# Patient Record
Sex: FEMALE | Race: WHITE | NOT HISPANIC OR LATINO | Employment: OTHER | URBAN - METROPOLITAN AREA
[De-identification: names, ages, dates, MRNs, and addresses within clinical notes are randomized per-mention and may not be internally consistent; named-entity substitution may affect disease eponyms.]

---

## 2017-06-19 ENCOUNTER — TELEPHONE (OUTPATIENT)
Dept: NEUROLOGY | Facility: CLINIC | Age: 62
End: 2017-06-19

## 2017-07-12 ENCOUNTER — TELEPHONE (OUTPATIENT)
Dept: NEUROLOGY | Facility: CLINIC | Age: 62
End: 2017-07-12

## 2019-05-13 NOTE — TELEPHONE ENCOUNTER
----- Message from Patrick Dawson sent at 5/13/2019 10:00 AM CDT -----  Contact: pt  Type:  Sooner Apoointment Request    Caller is requesting a sooner appointment.  Caller declined first available appointment listed below.  Caller will not accept being placed on the waitlist and is requesting a message be sent to doctor.    Name of Caller:  pt  When is the first available appointment?  6.25.19  Symptoms:  est care / previous pt at previous location / 4 mo fu   Best Call Back Number:  [phone]  Additional Information:

## 2019-05-14 NOTE — TELEPHONE ENCOUNTER
----- Message from Lizzy Salmeron sent at 5/14/2019  3:13 PM CDT -----  Contact: Patient   Type:  Sooner Apoointment Request    Caller is requesting a sooner appointment.  Caller declined first available appointment listed below.  Caller will not accept being placed on the waitlist and is requesting a message be sent to doctor.    Name of Caller:  Patient  When is the first available appointment?  07/03  Symptoms:  Establish care and follow up from hospital  Best Call Back Number:  [phone]  Additional Information:  Please contact to advise

## 2019-05-15 DIAGNOSIS — I63.519 CEREBROVASCULAR ACCIDENT (CVA) DUE TO OCCLUSION OF MIDDLE CEREBRAL ARTERY, UNSPECIFIED BLOOD VESSEL LATERALITY: Primary | ICD-10-CM

## 2019-05-15 RX ORDER — CLOPIDOGREL BISULFATE 75 MG/1
75 TABLET ORAL NIGHTLY
Qty: 30 TABLET | Refills: 6 | Status: SHIPPED | OUTPATIENT
Start: 2019-05-15 | End: 2019-06-04 | Stop reason: SDUPTHER

## 2019-05-15 NOTE — TELEPHONE ENCOUNTER
----- Message from Sabrina Sears sent at 5/15/2019  8:27 AM CDT -----  Contact: Margie riggins  Type:  RX Refill Request    Who Called:  Yael  Refill or New Rx:  refills  RX Name and Strength:  clopidogrel (PLAVIX) 75 mg tablet  How is the patient currently taking it? (ex. 1XDay):  As directed  Is this a 30 day or 90 day RX:  30  Preferred Pharmacy with phone number:    [facility] [address] [facility] [id number] [phone]    Local or Mail Order:  local  Ordering Provider:  Kemi Blanco Call Back Number:  [phone]  Additional Information:  Pt is also requesting blood work to be ordered. Pls call pt regarding both.

## 2019-06-05 RX ORDER — CLOPIDOGREL BISULFATE 75 MG/1
75 TABLET ORAL NIGHTLY
Qty: 30 TABLET | Refills: 2 | Status: SHIPPED | OUTPATIENT
Start: 2019-06-05 | End: 2019-06-19 | Stop reason: SDUPTHER

## 2019-06-19 ENCOUNTER — PATIENT OUTREACH (OUTPATIENT)
Dept: ADMINISTRATIVE | Facility: HOSPITAL | Age: 64
End: 2019-06-19

## 2019-06-19 RX ORDER — ZOLPIDEM TARTRATE 10 MG/1
10 TABLET ORAL NIGHTLY
Qty: 30 TABLET | Refills: 3 | Status: SHIPPED | OUTPATIENT
Start: 2019-06-19 | End: 2019-07-03 | Stop reason: SDUPTHER

## 2019-06-19 RX ORDER — CLOPIDOGREL BISULFATE 75 MG/1
75 TABLET ORAL NIGHTLY
Qty: 30 TABLET | Refills: 2 | Status: SHIPPED | OUTPATIENT
Start: 2019-06-19 | End: 2019-07-03 | Stop reason: SDUPTHER

## 2019-06-19 RX ORDER — TOPIRAMATE 100 MG/1
100 TABLET, FILM COATED ORAL NIGHTLY
Qty: 90 TABLET | Refills: 3 | Status: SHIPPED | OUTPATIENT
Start: 2019-06-19 | End: 2019-07-03 | Stop reason: SDUPTHER

## 2019-06-19 RX ORDER — TOPIRAMATE 100 MG/1
1 TABLET, FILM COATED ORAL NIGHTLY
Refills: 3 | COMMUNITY
Start: 2019-05-29 | End: 2019-06-19 | Stop reason: SDUPTHER

## 2019-06-19 NOTE — PROGRESS NOTES
Health Maintenance Due   Topic Date Due    Hepatitis C Screening  1955    TETANUS VACCINE  07/12/1974    Mammogram  07/12/1996    Colonoscopy  07/12/2006       Chart review completed 06/19/2019

## 2019-06-19 NOTE — LETTER
June 19, 2019    Margie Zamudio  [address] [address]             [facility]  [address]  [facility] [address]  Phone: [phone] Dear Corby Zamudio:    [facility] is committed to your overall health.  To help you get the most out of each of your visits, we will review your information to make sure you are up to date on all of your recommended tests and/or procedures.      Luis Mcmullen MD  has found that your chart shows you may be due for the following:    Health Maintenance Due   Topic    Hepatitis C Screening     TETANUS VACCINE     Mammogram     Colonoscopy        If you have had any of the above done at another facility, please bring the records with you or FAX them to [phone] so that your record at [facility] will be complete.  If you have not had any of these tests or procedures done recently and would like to complete this testing, please call [phone] or send a message through your [facility] portal to your provider's office.    If you have an upcoming scheduled appointment for the above test and/or procedures, please disregard this letter.        If you have any questions or concerns, please don't hesitate to call.    Sincerely,    Delfina JAMES, Panel Care Coordinator, [phone]   [facility]  [address] [facility] [address]  [place] [address]  [phone] (f)

## 2019-06-19 NOTE — TELEPHONE ENCOUNTER
----- Message from Carmita Reed sent at 6/19/2019  9:19 AM CDT -----  Contact: pt  Calling about medications and have questions and please advise. [phone]

## 2019-07-01 ENCOUNTER — LAB VISIT (OUTPATIENT)
Dept: LAB | Facility: HOSPITAL | Age: 64
End: 2019-07-01
Attending: INTERNAL MEDICINE
Payer: MEDICARE

## 2019-07-01 DIAGNOSIS — I63.519 CEREBROVASCULAR ACCIDENT (CVA) DUE TO OCCLUSION OF MIDDLE CEREBRAL ARTERY, UNSPECIFIED BLOOD VESSEL LATERALITY: ICD-10-CM

## 2019-07-01 LAB
ALBUMIN SERPL BCP-MCNC: 3.5 G/DL (ref 3.5–5.2)
ALP SERPL-CCNC: 191 U/L (ref 55–135)
ALT SERPL W/O P-5'-P-CCNC: 17 U/L (ref 10–44)
ANION GAP SERPL CALC-SCNC: 8 MMOL/L (ref 8–16)
AST SERPL-CCNC: 19 U/L (ref 10–40)
BASOPHILS # BLD AUTO: 0.08 K/UL (ref 0–0.2)
BASOPHILS NFR BLD: 1 % (ref 0–1.9)
BILIRUB SERPL-MCNC: 0.4 MG/DL (ref 0.1–1)
BUN SERPL-MCNC: 8 MG/DL (ref 8–23)
CALCIUM SERPL-MCNC: 10.2 MG/DL (ref 8.7–10.5)
CHLORIDE SERPL-SCNC: 103 MMOL/L (ref 95–110)
CHOLEST SERPL-MCNC: 99 MG/DL (ref 120–199)
CHOLEST/HDLC SERPL: 2.3 {RATIO} (ref 2–5)
CO2 SERPL-SCNC: 32 MMOL/L (ref 23–29)
CREAT SERPL-MCNC: 0.8 MG/DL (ref 0.5–1.4)
DIFFERENTIAL METHOD: ABNORMAL
EOSINOPHIL # BLD AUTO: 0.3 K/UL (ref 0–0.5)
EOSINOPHIL NFR BLD: 3 % (ref 0–8)
ERYTHROCYTE [DISTWIDTH] IN BLOOD BY AUTOMATED COUNT: 14.1 % (ref 11.5–14.5)
EST. GFR  (AFRICAN AMERICAN): >60 ML/MIN/1.73 M^2
EST. GFR  (NON AFRICAN AMERICAN): >60 ML/MIN/1.73 M^2
GLUCOSE SERPL-MCNC: 84 MG/DL (ref 70–110)
HCT VFR BLD AUTO: 49.9 % (ref 37–48.5)
HDLC SERPL-MCNC: 43 MG/DL (ref 40–75)
HDLC SERPL: 43.4 % (ref 20–50)
HGB BLD-MCNC: 15.6 G/DL (ref 12–16)
IMM GRANULOCYTES # BLD AUTO: 0.02 K/UL (ref 0–0.04)
IMM GRANULOCYTES NFR BLD AUTO: 0.2 % (ref 0–0.5)
LDLC SERPL CALC-MCNC: 46.6 MG/DL (ref 63–159)
LYMPHOCYTES # BLD AUTO: 1.8 K/UL (ref 1–4.8)
LYMPHOCYTES NFR BLD: 21.7 % (ref 18–48)
MCH RBC QN AUTO: 31.4 PG (ref 27–31)
MCHC RBC AUTO-ENTMCNC: 31.3 G/DL (ref 32–36)
MCV RBC AUTO: 100 FL (ref 82–98)
MONOCYTES # BLD AUTO: 0.6 K/UL (ref 0.3–1)
MONOCYTES NFR BLD: 7.2 % (ref 4–15)
NEUTROPHILS # BLD AUTO: 5.6 K/UL (ref 1.8–7.7)
NEUTROPHILS NFR BLD: 66.9 % (ref 38–73)
NONHDLC SERPL-MCNC: 56 MG/DL
NRBC BLD-RTO: 0 /100 WBC
PLATELET # BLD AUTO: 253 K/UL (ref 150–350)
PMV BLD AUTO: 10.7 FL (ref 9.2–12.9)
POTASSIUM SERPL-SCNC: 4 MMOL/L (ref 3.5–5.1)
PROT SERPL-MCNC: 6.2 G/DL (ref 6–8.4)
RBC # BLD AUTO: 4.97 M/UL (ref 4–5.4)
SODIUM SERPL-SCNC: 143 MMOL/L (ref 136–145)
TRIGL SERPL-MCNC: 47 MG/DL (ref 30–150)
WBC # BLD AUTO: 8.39 K/UL (ref 3.9–12.7)

## 2019-07-01 PROCEDURE — 80053 COMPREHEN METABOLIC PANEL: CPT

## 2019-07-01 PROCEDURE — 85025 COMPLETE CBC W/AUTO DIFF WBC: CPT

## 2019-07-01 PROCEDURE — 36415 COLL VENOUS BLD VENIPUNCTURE: CPT | Mod: PO

## 2019-07-01 PROCEDURE — 80061 LIPID PANEL: CPT

## 2019-07-01 RX ORDER — GABAPENTIN 800 MG/1
800 TABLET ORAL 3 TIMES DAILY
Qty: 90 TABLET | Refills: 3 | Status: SHIPPED | OUTPATIENT
Start: 2019-07-01 | End: 2019-07-03 | Stop reason: SDUPTHER

## 2019-07-01 RX ORDER — PRAVASTATIN SODIUM 10 MG/1
20 TABLET ORAL NIGHTLY
Qty: 90 TABLET | Refills: 1 | Status: SHIPPED | OUTPATIENT
Start: 2019-07-01 | End: 2019-07-03 | Stop reason: SDUPTHER

## 2019-07-03 ENCOUNTER — OFFICE VISIT (OUTPATIENT)
Dept: FAMILY MEDICINE | Facility: CLINIC | Age: 64
End: 2019-07-03
Payer: MEDICARE

## 2019-07-03 VITALS
SYSTOLIC BLOOD PRESSURE: 118 MMHG | BODY MASS INDEX: 22.21 KG/M2 | HEIGHT: 60 IN | HEART RATE: 85 BPM | WEIGHT: 113.13 LBS | OXYGEN SATURATION: 98 % | DIASTOLIC BLOOD PRESSURE: 86 MMHG

## 2019-07-03 DIAGNOSIS — J41.1 MUCOPURULENT CHRONIC BRONCHITIS: Primary | ICD-10-CM

## 2019-07-03 DIAGNOSIS — I63.50 CEREBRAL ARTERY OCCLUSION WITH CEREBRAL INFARCTION: ICD-10-CM

## 2019-07-03 DIAGNOSIS — G47.01 INSOMNIA DUE TO MEDICAL CONDITION: ICD-10-CM

## 2019-07-03 DIAGNOSIS — G89.29 CHRONIC BILATERAL LOW BACK PAIN WITH BILATERAL SCIATICA: ICD-10-CM

## 2019-07-03 DIAGNOSIS — M54.41 CHRONIC BILATERAL LOW BACK PAIN WITH BILATERAL SCIATICA: ICD-10-CM

## 2019-07-03 DIAGNOSIS — F32.4 MAJOR DEPRESSIVE DISORDER WITH SINGLE EPISODE, IN PARTIAL REMISSION: ICD-10-CM

## 2019-07-03 DIAGNOSIS — Z12.39 BREAST CANCER SCREENING: ICD-10-CM

## 2019-07-03 DIAGNOSIS — E78.2 MIXED HYPERLIPIDEMIA: ICD-10-CM

## 2019-07-03 DIAGNOSIS — M54.42 CHRONIC BILATERAL LOW BACK PAIN WITH BILATERAL SCIATICA: ICD-10-CM

## 2019-07-03 DIAGNOSIS — F41.9 ANXIETY: ICD-10-CM

## 2019-07-03 PROCEDURE — 99214 PR OFFICE/OUTPT VISIT, EST, LEVL IV, 30-39 MIN: ICD-10-PCS | Mod: S$PBB,,, | Performed by: INTERNAL MEDICINE

## 2019-07-03 PROCEDURE — 99999 PR PBB SHADOW E&M-EST. PATIENT-LVL III: ICD-10-PCS | Mod: PBBFAC,,, | Performed by: INTERNAL MEDICINE

## 2019-07-03 PROCEDURE — 99213 OFFICE O/P EST LOW 20 MIN: CPT | Mod: PBBFAC,PO | Performed by: INTERNAL MEDICINE

## 2019-07-03 PROCEDURE — 99999 PR PBB SHADOW E&M-EST. PATIENT-LVL III: CPT | Mod: PBBFAC,,, | Performed by: INTERNAL MEDICINE

## 2019-07-03 PROCEDURE — 99214 OFFICE O/P EST MOD 30 MIN: CPT | Mod: S$PBB,,, | Performed by: INTERNAL MEDICINE

## 2019-07-03 RX ORDER — GABAPENTIN 800 MG/1
800 TABLET ORAL 3 TIMES DAILY
Qty: 270 TABLET | Refills: 3 | Status: ON HOLD | OUTPATIENT
Start: 2019-07-03 | End: 2019-11-21 | Stop reason: SDUPTHER

## 2019-07-03 RX ORDER — ZOLPIDEM TARTRATE 10 MG/1
10 TABLET ORAL NIGHTLY
Qty: 30 TABLET | Refills: 3 | Status: ON HOLD | OUTPATIENT
Start: 2019-07-03 | End: 2019-11-21 | Stop reason: SDUPTHER

## 2019-07-03 RX ORDER — TOPIRAMATE 100 MG/1
100 TABLET, FILM COATED ORAL NIGHTLY
Qty: 90 TABLET | Refills: 3 | Status: SHIPPED | OUTPATIENT
Start: 2019-07-03 | End: 2020-02-07

## 2019-07-03 RX ORDER — ALBUTEROL SULFATE 90 UG/1
2 AEROSOL, METERED RESPIRATORY (INHALATION) EVERY 4 HOURS PRN
Qty: 18 G | Refills: 6 | Status: SHIPPED | OUTPATIENT
Start: 2019-07-03 | End: 2019-11-07 | Stop reason: SDUPTHER

## 2019-07-03 RX ORDER — PRAVASTATIN SODIUM 10 MG/1
10 TABLET ORAL NIGHTLY
Qty: 90 TABLET | Refills: 3 | Status: SHIPPED | OUTPATIENT
Start: 2019-07-03 | End: 2020-02-07 | Stop reason: SDUPTHER

## 2019-07-03 RX ORDER — CLOPIDOGREL BISULFATE 75 MG/1
75 TABLET ORAL NIGHTLY
Qty: 90 TABLET | Refills: 3 | Status: SHIPPED | OUTPATIENT
Start: 2019-07-03 | End: 2019-12-18 | Stop reason: SDUPTHER

## 2019-07-03 RX ORDER — ERGOCALCIFEROL 1.25 MG/1
50000 CAPSULE ORAL
Qty: 12 CAPSULE | Refills: 3 | Status: SHIPPED | OUTPATIENT
Start: 2019-07-05 | End: 2020-02-07

## 2019-07-03 RX ORDER — DULOXETIN HYDROCHLORIDE 30 MG/1
30 CAPSULE, DELAYED RELEASE ORAL DAILY
Refills: 4 | COMMUNITY
Start: 2019-06-24 | End: 2019-07-03 | Stop reason: SDUPTHER

## 2019-07-03 RX ORDER — DULOXETIN HYDROCHLORIDE 30 MG/1
30 CAPSULE, DELAYED RELEASE ORAL DAILY
Qty: 90 CAPSULE | Refills: 3 | Status: SHIPPED | OUTPATIENT
Start: 2019-07-03 | End: 2020-02-07 | Stop reason: SDUPTHER

## 2019-07-03 RX ORDER — ALPRAZOLAM 1 MG/1
1 TABLET ORAL 3 TIMES DAILY
Qty: 90 TABLET | Refills: 3 | Status: SHIPPED | OUTPATIENT
Start: 2019-07-03 | End: 2020-01-20

## 2019-07-03 RX ORDER — FLUTICASONE PROPIONATE AND SALMETEROL 250; 50 UG/1; UG/1
1 POWDER RESPIRATORY (INHALATION)
Qty: 180 EACH | Refills: 3 | Status: SHIPPED | OUTPATIENT
Start: 2019-07-03 | End: 2019-07-08 | Stop reason: SDUPTHER

## 2019-07-03 NOTE — PROGRESS NOTES
Patient ID: Margie Zamudio     Chief Complaint:   Chief Complaint   Patient presents with    Establish Care/Previous PT        HPI: New Patient to [facility] but known to me.   Most significant PAST MEDICAL HISTORY of Left  CVA w/ Right suzette that she has recovered well from but not fully.  She also has significant COPD due to long history of smoking.   Needs refills and I'll try to get her Advair as a controller.   Labs reviewed: Lipids low and she lost weight- will decrease Pravastatin to 10 mg /day. Vit D likely still low- add daily Vit D.     Review of Systems   Constitutional: Negative.    HENT: Negative.    Eyes: Negative.    Respiratory: Positive for cough and shortness of breath.    Cardiovascular: Negative.    Gastrointestinal: Negative.    Endocrine: Negative.    Genitourinary: Negative.    Musculoskeletal: Positive for gait problem.   Skin: Negative.    Allergic/Immunologic: Negative.    Hematological: Negative.    Psychiatric/Behavioral: Negative.           Objective:      Physical Exam   Physical Exam   Constitutional: She is oriented to person, place, and time. She appears well-developed and well-nourished.   HENT:   Head: Normocephalic and atraumatic.   Nose: Nose normal.   Mouth/Throat: Oropharynx is clear and moist.   Eyes: Pupils are equal, round, and reactive to light. Conjunctivae and EOM are normal.   Neck: Normal range of motion. Neck supple.   Cardiovascular: Normal rate, regular rhythm, normal heart sounds and intact distal pulses.   Pulmonary/Chest: Effort normal. She has decreased breath sounds in the right upper field, the right middle field, the right lower field, the left upper field and the left middle field. She has rhonchi in the right lower field and the left lower field.   Abdominal: Soft. Bowel sounds are normal.   Musculoskeletal:        Left hip: She exhibits decreased range of motion and decreased strength.   Neurological: She is alert and oriented to person, place, and time.    Skin: Skin is warm and dry. Capillary refill takes less than 2 seconds.   Psychiatric: She has a normal mood and affect. Her behavior is normal. Judgment and thought content normal.   Nursing note and vitals reviewed.         Vitals:   Vitals:    07/03/19 1533   BP: 118/86   BP Location: Right arm   Patient Position: Sitting   BP Method: Medium (Manual)   Pulse: 85   SpO2: 98%   Weight: 51.3 kg (113 lb 1.5 oz)   Height: 5' (1.524 m)      Assessment:       Patient Active Problem List    Diagnosis Date Noted    Pre-operative respiratory examination 08/31/2015    Carotid stenosis, symptomatic, with infarction 08/30/2015    Cerebral artery occlusion with cerebral infarction 08/30/2015    Unspecified essential hypertension     Chronic prescription benzodiazepine use 08/29/2015    Hypokalemia 08/29/2015    Smoker 08/29/2015    Stroke 08/28/2015    Overdose of opiate or related narcotic 02/23/2014    Gastroenteritis 10/14/2013    Chronic back pain     Narcotic dependence     Osteoporosis     COPD (chronic obstructive pulmonary disease) 10/13/2013          Plan:       Margie was seen today for establish care/previous pt.    Diagnoses and all orders for this visit:    Mucopurulent chronic bronchitis  -     albuterol (PROVENTIL/VENTOLIN HFA) 90 mcg/actuation inhaler; Inhale 2 puffs into the lungs every 4 (four) hours as needed for Wheezing or Shortness of Breath.  -     fluticasone-salmeterol diskus inhaler 250-50 mcg; Inhale 1 puff into the lungs every 4 to 6 hours as needed.    Cerebral artery occlusion with cerebral infarction  -     clopidogrel (PLAVIX) 75 mg tablet; Take 1 tablet (75 mg total) by mouth nightly.    Anxiety  -     ALPRAZolam (XANAX) 1 MG tablet; Take 1 tablet (1 mg total) by mouth 3 (three) times daily.  CONTROLLED      Major depressive disorder with single episode, in partial remission  -     DULoxetine (CYMBALTA) 30 MG capsule; Take 1 capsule (30 mg total) by mouth once daily.  CONTROLLED       Chronic bilateral low back pain with bilateral sciatica  -     gabapentin (NEURONTIN) 800 MG tablet; Take 1 tablet (800 mg total) by mouth 3 (three) times daily.    Mixed hyperlipidemia  -     pravastatin (PRAVACHOL) 10 MG tablet; Take 1 tablet (10 mg total) by mouth every evening.  -     Hepatitis C antibody; Future  OVERMEDICATED    Insomnia due to medical condition  -     topiramate (TOPAMAX) 100 MG tablet; Take 1 tablet (100 mg total) by mouth nightly.  -     zolpidem (AMBIEN) 10 mg Tab; Take 1 tablet (10 mg total) by mouth every evening.    Breast cancer screening  -     Mammo Digital Screening Bilat w/ Humberto; Future    Other orders  -     ergocalciferol (ERGOCALCIFEROL) 50,000 unit Cap; Take 1 capsule (50,000 Units total) by mouth Every Friday.  Take OTC Vit D 2000 units other 6 days of week

## 2019-07-08 DIAGNOSIS — M54.42 CHRONIC BILATERAL LOW BACK PAIN WITH BILATERAL SCIATICA: ICD-10-CM

## 2019-07-08 DIAGNOSIS — E78.2 MIXED HYPERLIPIDEMIA: ICD-10-CM

## 2019-07-08 DIAGNOSIS — G47.01 INSOMNIA DUE TO MEDICAL CONDITION: ICD-10-CM

## 2019-07-08 DIAGNOSIS — F32.4 MAJOR DEPRESSIVE DISORDER WITH SINGLE EPISODE, IN PARTIAL REMISSION: ICD-10-CM

## 2019-07-08 DIAGNOSIS — G89.29 CHRONIC BILATERAL LOW BACK PAIN WITH BILATERAL SCIATICA: ICD-10-CM

## 2019-07-08 DIAGNOSIS — F41.9 ANXIETY: ICD-10-CM

## 2019-07-08 DIAGNOSIS — I63.50 CEREBRAL ARTERY OCCLUSION WITH CEREBRAL INFARCTION: ICD-10-CM

## 2019-07-08 DIAGNOSIS — M54.41 CHRONIC BILATERAL LOW BACK PAIN WITH BILATERAL SCIATICA: ICD-10-CM

## 2019-07-08 DIAGNOSIS — J41.1 MUCOPURULENT CHRONIC BRONCHITIS: ICD-10-CM

## 2019-07-08 RX ORDER — FLUTICASONE PROPIONATE AND SALMETEROL 250; 50 UG/1; UG/1
1 POWDER RESPIRATORY (INHALATION)
Qty: 180 EACH | Refills: 3 | Status: SHIPPED | OUTPATIENT
Start: 2019-07-08 | End: 2019-12-18 | Stop reason: SDUPTHER

## 2019-07-08 NOTE — TELEPHONE ENCOUNTER
----- Message from Katja Burgess sent at 7/8/2019  8:24 AM CDT -----  Contact: pt [phone]  Patient called and asked if you will call all of her medications into [facility] in [place] she stated one of her medications was called into the pharamacy at the [facility] pt is asking if you will call it into the [facility] pharmacy instead.

## 2019-08-28 ENCOUNTER — DOCUMENTATION ONLY (OUTPATIENT)
Dept: FAMILY MEDICINE | Facility: CLINIC | Age: 64
End: 2019-08-28

## 2019-08-28 NOTE — PROGRESS NOTES
PA initiated for Advair Diskus 250/50  CMM: [id number] - [id number]  [employer]  -----------------------------------------  APPROVED  Valid 5/30/19-8/27/2020

## 2019-10-25 ENCOUNTER — PATIENT OUTREACH (OUTPATIENT)
Dept: ADMINISTRATIVE | Facility: HOSPITAL | Age: 64
End: 2019-10-25

## 2019-10-25 NOTE — PROGRESS NOTES
Health Maintenance Due   Topic Date Due    Hepatitis C Screening  1955    Pneumococcal Vaccine (Medium Risk) (1 of 1 - PPSV23) 07/12/1975    Mammogram  07/12/1996    Shingles Vaccine (1 of 2) 07/12/2006    Influenza Vaccine (1) 09/01/2019     Dear Judith, [facility] is committed to your overall health.  To help you get the most out of each of your visits, we will review your information to make sure you are up to date on all of your recommended tests and/or procedures.      Luis Mcmullen MD  has found that your chart shows you may be due for the following:    Health Maintenance Due   Topic    Hepatitis C Screening     Pneumococcal Vaccine (Medium Risk) (1 of 1 - PPSV23)    Mammogram          If you have had any of the above done at another facility, please bring the records with you or Fax them to [phone] so that your record at [facility] will be complete. If you have not had any of these tests or procedures done recently and would like to complete this testing ,  please call [phone] or send a message through your [facility] portal to your provider's office.     If you have an upcoming scheduled appointment for the above test and/or procedures, please disregard this letter.    If you are currently taking medication, please bring it with you to your appointment for review.      Thank you for letting us care for you,        Lesa Enriquez, Care Coordinator  [facility]  Phone: [phone]  Fax: [phone]

## 2019-10-25 NOTE — LETTER
October 25, 2019    Margie Zamudio  [address] [address]             [facility]  [address]  [facility] [address]  Phone: [phone] Dear Margie      [facility] committed to your overall health.  To help you get the most out of each of your visits, we will review your information to make sure you are up to date on all of your recommended tests and/or procedures.      Luis Mcmullen MD  has found that your chart shows you may be due for the following:    One-time Hepatitis C Screening lab test(a viral condition that can harm the liver)  Pneumonia Immunization  Mammogram  Influenza Vaccine  Shingles Immunization    If you have had any of the above done at another facility, please bring the records with you or Fax them to [phone] so that your record at [facility] will be complete. If you have not had any of these tests or procedures done recently and would like to complete this testing ,  please call [phone] or send a message through your [facility] portal to your provider's office.     If you have an upcoming scheduled appointment for the above test and/or procedures, please disregard this letter.    If you are currently taking medication, please bring it with you to your appointment for review.      Thank you for letting us care for you,    Lesa Enriquez, Care Coordinator  [facility]  Phone: [phone]  Fax: [phone]

## 2019-11-04 ENCOUNTER — LAB VISIT (OUTPATIENT)
Dept: LAB | Facility: HOSPITAL | Age: 64
End: 2019-11-04
Attending: INTERNAL MEDICINE
Payer: MEDICARE

## 2019-11-04 DIAGNOSIS — E78.2 MIXED HYPERLIPIDEMIA: ICD-10-CM

## 2019-11-04 LAB
CHOLEST SERPL-MCNC: 128 MG/DL (ref 120–199)
CHOLEST/HDLC SERPL: 2 {RATIO} (ref 2–5)
HDLC SERPL-MCNC: 63 MG/DL (ref 40–75)
HDLC SERPL: 49.2 % (ref 20–50)
LDLC SERPL CALC-MCNC: 58.2 MG/DL (ref 63–159)
NONHDLC SERPL-MCNC: 65 MG/DL
TRIGL SERPL-MCNC: 34 MG/DL (ref 30–150)

## 2019-11-04 PROCEDURE — 36415 COLL VENOUS BLD VENIPUNCTURE: CPT | Mod: PO

## 2019-11-04 PROCEDURE — 86803 HEPATITIS C AB TEST: CPT

## 2019-11-04 PROCEDURE — 80061 LIPID PANEL: CPT

## 2019-11-05 LAB — HCV AB SERPL QL IA: NEGATIVE

## 2019-11-07 ENCOUNTER — OFFICE VISIT (OUTPATIENT)
Dept: FAMILY MEDICINE | Facility: CLINIC | Age: 64
End: 2019-11-07
Payer: MEDICARE

## 2019-11-07 VITALS
DIASTOLIC BLOOD PRESSURE: 70 MMHG | HEART RATE: 76 BPM | BODY MASS INDEX: 22.33 KG/M2 | HEIGHT: 60 IN | TEMPERATURE: 99 F | WEIGHT: 113.75 LBS | OXYGEN SATURATION: 94 % | SYSTOLIC BLOOD PRESSURE: 108 MMHG

## 2019-11-07 DIAGNOSIS — I63.50 CEREBRAL ARTERY OCCLUSION WITH CEREBRAL INFARCTION: ICD-10-CM

## 2019-11-07 DIAGNOSIS — J44.1 COPD WITH ACUTE EXACERBATION: Primary | ICD-10-CM

## 2019-11-07 DIAGNOSIS — E78.2 MIXED HYPERLIPIDEMIA: ICD-10-CM

## 2019-11-07 DIAGNOSIS — J41.1 MUCOPURULENT CHRONIC BRONCHITIS: ICD-10-CM

## 2019-11-07 PROCEDURE — 99214 PR OFFICE/OUTPT VISIT, EST, LEVL IV, 30-39 MIN: ICD-10-PCS | Mod: S$PBB,,, | Performed by: INTERNAL MEDICINE

## 2019-11-07 PROCEDURE — 99999 PR PBB SHADOW E&M-EST. PATIENT-LVL III: ICD-10-PCS | Mod: PBBFAC,,, | Performed by: INTERNAL MEDICINE

## 2019-11-07 PROCEDURE — 99214 OFFICE O/P EST MOD 30 MIN: CPT | Mod: S$PBB,,, | Performed by: INTERNAL MEDICINE

## 2019-11-07 PROCEDURE — 99999 PR PBB SHADOW E&M-EST. PATIENT-LVL III: CPT | Mod: PBBFAC,,, | Performed by: INTERNAL MEDICINE

## 2019-11-07 PROCEDURE — 99213 OFFICE O/P EST LOW 20 MIN: CPT | Mod: PBBFAC,PO | Performed by: INTERNAL MEDICINE

## 2019-11-07 PROCEDURE — 96372 THER/PROPH/DIAG INJ SC/IM: CPT | Mod: PBBFAC,PO

## 2019-11-07 PROCEDURE — 94640 AIRWAY INHALATION TREATMENT: CPT | Mod: PBBFAC,59,PO

## 2019-11-07 RX ORDER — ALBUTEROL SULFATE 0.83 MG/ML
2.5 SOLUTION RESPIRATORY (INHALATION) EVERY 4 HOURS PRN
Qty: 100 EACH | Refills: 3 | Status: SHIPPED | OUTPATIENT
Start: 2019-11-07 | End: 2019-11-12 | Stop reason: SDUPTHER

## 2019-11-07 RX ORDER — AMOXICILLIN AND CLAVULANATE POTASSIUM 875; 125 MG/1; MG/1
1 TABLET, FILM COATED ORAL EVERY 12 HOURS
Qty: 14 TABLET | Refills: 0 | Status: SHIPPED | OUTPATIENT
Start: 2019-11-07 | End: 2019-11-14

## 2019-11-07 RX ORDER — ALBUTEROL SULFATE 0.83 MG/ML
2.5 SOLUTION RESPIRATORY (INHALATION)
Status: COMPLETED | OUTPATIENT
Start: 2019-11-07 | End: 2019-11-07

## 2019-11-07 RX ORDER — PREDNISONE 10 MG/1
10 TABLET ORAL DAILY
Qty: 28 TABLET | Refills: 0 | Status: ON HOLD | OUTPATIENT
Start: 2019-11-07 | End: 2019-11-21 | Stop reason: HOSPADM

## 2019-11-07 RX ORDER — ALBUTEROL SULFATE 0.83 MG/ML
2.5 SOLUTION RESPIRATORY (INHALATION)
Status: DISCONTINUED | OUTPATIENT
Start: 2019-11-07 | End: 2019-11-21 | Stop reason: HOSPADM

## 2019-11-07 RX ORDER — TRIAMCINOLONE ACETONIDE 40 MG/ML
80 INJECTION, SUSPENSION INTRA-ARTICULAR; INTRAMUSCULAR ONCE
Status: COMPLETED | OUTPATIENT
Start: 2019-11-07 | End: 2019-11-07

## 2019-11-07 RX ORDER — ALBUTEROL SULFATE 90 UG/1
2 AEROSOL, METERED RESPIRATORY (INHALATION) EVERY 4 HOURS PRN
Qty: 18 G | Refills: 6 | Status: SHIPPED | OUTPATIENT
Start: 2019-11-07 | End: 2020-02-07 | Stop reason: SDUPTHER

## 2019-11-07 RX ADMIN — TRIAMCINOLONE ACETONIDE 80 MG: 40 INJECTION, SUSPENSION INTRA-ARTICULAR; INTRAMUSCULAR at 11:11

## 2019-11-07 RX ADMIN — ALBUTEROL SULFATE 2.5 MG: 0.83 SOLUTION RESPIRATORY (INHALATION) at 12:11

## 2019-11-07 NOTE — PROGRESS NOTES
Patient ID: Margie Zamudio     Chief Complaint:   Chief Complaint   Patient presents with    4 month follow up    Flu Vaccine    Chest Congestion    Medication Refill     Albuterol inhaler        HPI: Complains of symptoms of URI since the weekend: cough w/ clear phlegm, worsened shortness of breath, subjective fevers and chills, no O2 at home. + sick contact in sister that was visiting and she had similar symptoms. Needs refill on Albuterol and some steroids and antibiotics. She was hypoxic when roomed, but it improved w/ deep inspiration. We discussed going to ER but she declines. I will treat her for a COPD flare and she promises to go to ER if her symptoms worsen.     Review of Systems   Constitutional: Negative.    HENT: Negative.    Eyes: Negative.    Respiratory: Positive for cough, shortness of breath and wheezing.    Cardiovascular: Negative.    Gastrointestinal: Negative.    Endocrine: Negative.    Genitourinary: Negative.    Musculoskeletal: Negative.    Skin: Negative.    Allergic/Immunologic: Negative.    Neurological: Negative.    Hematological: Negative.    Psychiatric/Behavioral: Negative.           Objective:      Physical Exam   Physical Exam   Constitutional: She is oriented to person, place, and time. She appears well-developed and well-nourished.   HENT:   Head: Normocephalic and atraumatic.   Nose: Nose normal.   Mouth/Throat: Oropharynx is clear and moist.   Eyes: Pupils are equal, round, and reactive to light. Conjunctivae and EOM are normal.   Neck: Normal range of motion. Neck supple.   Cardiovascular: Normal rate, regular rhythm, normal heart sounds and intact distal pulses.   Pulmonary/Chest: Effort normal. She has wheezes. She has rales.   Prolonged expiratory phase bilaterally  + wheezing and rhonchi   Abdominal: Soft. Bowel sounds are normal.   Musculoskeletal: Normal range of motion.   Neurological: She is alert and oriented to person, place, and time.   Skin: Skin is warm and  dry. Capillary refill takes less than 2 seconds.   Psychiatric: She has a normal mood and affect. Her behavior is normal. Judgment and thought content normal.   Nursing note and vitals reviewed.      Current Outpatient Medications:     albuterol (PROVENTIL/VENTOLIN HFA) 90 mcg/actuation inhaler, Inhale 2 puffs into the lungs every 4 (four) hours as needed for Wheezing or Shortness of Breath., Disp: 18 g, Rfl: 6    ALPRAZolam (XANAX) 1 MG tablet, Take 1 tablet (1 mg total) by mouth 3 (three) times daily., Disp: 90 tablet, Rfl: 3    butalbital-acetaminophen-caffeine -40 mg (FIORICET, ESGIC) -40 mg per tablet, Take 1 tablet by mouth every 6 (six) hours., Disp: , Rfl:     clopidogrel (PLAVIX) 75 mg tablet, Take 1 tablet (75 mg total) by mouth nightly., Disp: 90 tablet, Rfl: 3    DULoxetine (CYMBALTA) 30 MG capsule, Take 1 capsule (30 mg total) by mouth once daily., Disp: 90 capsule, Rfl: 3    ergocalciferol (ERGOCALCIFEROL) 50,000 unit Cap, Take 1 capsule (50,000 Units total) by mouth Every Friday., Disp: 12 capsule, Rfl: 3    fluticasone-salmeterol diskus inhaler 250-50 mcg, Inhale 1 puff into the lungs every 4 to 6 hours as needed., Disp: 180 each, Rfl: 3    gabapentin (NEURONTIN) 800 MG tablet, Take 1 tablet (800 mg total) by mouth 3 (three) times daily., Disp: 270 tablet, Rfl: 3    pravastatin (PRAVACHOL) 10 MG tablet, Take 1 tablet (10 mg total) by mouth every evening., Disp: 90 tablet, Rfl: 3    tiotropium (SPIRIVA) 18 mcg inhalation capsule, Inhale 18 mcg into the lungs every 4 to 6 hours as needed., Disp: , Rfl:     topiramate (TOPAMAX) 100 MG tablet, Take 1 tablet (100 mg total) by mouth nightly., Disp: 90 tablet, Rfl: 3    zolpidem (AMBIEN) 10 mg Tab, Take 1 tablet (10 mg total) by mouth every evening., Disp: 30 tablet, Rfl: 3    amoxicillin-clavulanate 875-125mg (AUGMENTIN) 875-125 mg per tablet, Take 1 tablet by mouth every 12 (twelve) hours. for 7 days, Disp: 14 tablet, Rfl: 0     aspirin (ECOTRIN) 81 MG EC tablet, Take 1 tablet (81 mg total) by mouth once daily., Disp: , Rfl: 0    predniSONE (DELTASONE) 10 MG tablet, Take 1 tablet (10 mg total) by mouth once daily., Disp: 28 tablet, Rfl: 0    Current Facility-Administered Medications:     triamcinolone acetonide injection 80 mg, 80 mg, Intramuscular, Once, Luis Mcmullen MD       Vitals:   Vitals:    11/07/19 1041   BP: 108/70   Pulse: 76   Temp: 98.8 °F (37.1 °C)   TempSrc: Temporal   SpO2: (!) 87%   Weight: 51.6 kg (113 lb 12.1 oz)   Height: 5' (1.524 m)      Assessment:       Patient Active Problem List    Diagnosis Date Noted    COPD with acute exacerbation 11/07/2019    Anxiety 07/03/2019    Major depressive disorder with single episode, in partial remission 07/03/2019    Mixed hyperlipidemia 07/03/2019    Insomnia due to medical condition 07/03/2019    Breast cancer screening 07/03/2019    Pre-operative respiratory examination 08/31/2015    Carotid stenosis, symptomatic, with infarction 08/30/2015    Cerebral artery occlusion with cerebral infarction 08/30/2015    Unspecified essential hypertension     Chronic prescription benzodiazepine use 08/29/2015    Hypokalemia 08/29/2015    Smoker 08/29/2015    Stroke 08/28/2015    Overdose of opiate or related narcotic 02/23/2014    Gastroenteritis 10/14/2013    Chronic back pain     Narcotic dependence     Osteoporosis     COPD (chronic obstructive pulmonary disease) 10/13/2013          Plan:       Margie was seen today for 4 month follow up, flu vaccine, chest congestion and medication refill.    Diagnoses and all orders for this visit:    COPD with acute exacerbation  -     amoxicillin-clavulanate 875-125mg (AUGMENTIN) 875-125 mg per tablet; Take 1 tablet by mouth every 12 (twelve) hours. for 7 days  -     predniSONE (DELTASONE) 10 MG tablet; Take 1 tablet (10 mg total) by mouth once daily.    She sounds MUCH better after an albuterol neb in clinic- much less wheezing  and no rhonchi- will send in Rx for Albuterol 2.5 mg nebs. POx 94% before neb w/ deep breathing.     Mucopurulent chronic bronchitis  -     albuterol (PROVENTIL/VENTOLIN HFA) 90 mcg/actuation inhaler; Inhale 2 puffs into the lungs every 4 (four) hours as needed for Wheezing or Shortness of Breath.    Cerebral artery occlusion with cerebral infarction  Stable    Mixed hyperlipidemia  Controlled     Other orders  -     triamcinolone acetonide injection 80 mg       Flu shot today

## 2019-11-12 ENCOUNTER — DOCUMENTATION ONLY (OUTPATIENT)
Dept: FAMILY MEDICINE | Facility: CLINIC | Age: 64
End: 2019-11-12

## 2019-11-12 DIAGNOSIS — J44.1 COPD WITH ACUTE EXACERBATION: ICD-10-CM

## 2019-11-12 RX ORDER — ALBUTEROL SULFATE 0.83 MG/ML
2.5 SOLUTION RESPIRATORY (INHALATION) EVERY 4 HOURS PRN
Qty: 100 EACH | Refills: 3 | Status: SHIPPED | OUTPATIENT
Start: 2019-11-12 | End: 2019-11-12 | Stop reason: SDUPTHER

## 2019-11-12 NOTE — TELEPHONE ENCOUNTER
----- Message from Chris Reid sent at 11/12/2019  8:48 AM CST -----  Contact: pt  Type:  RX Refill Request    Who Called:  pt  Refill or New Rx:  Refill / new  RX Name and Strength:  albuterol nebulizer solution 2.5 mg / nebulizer device  How is the patient currently taking it? (ex. 1XDay):  As needed  Is this a 30 day or 90 day RX:  90  Preferred Pharmacy with phone number:    [facility] - [place], [place] - [address]  [address]  [address]  [facility] [address]  Phone: [phone] Fax: [phone]    [facility] - [place], [place] - [address]   [facility] [address]  Phone: [phone] Fax: [phone]      Local or Mail Order:  local  Ordering Provider:    Best Call Back Number:  [phone]  Additional Information:  Pt is waiting on nebulizer and solution. Please call to advise on timeline.

## 2019-11-12 NOTE — TELEPHONE ENCOUNTER
Patient asking for machine and tubing as well, her machine Is 10 years old    Needs to be sent to [facility] instead

## 2019-11-12 NOTE — PROGRESS NOTES
PA initiated for Albuterol 0.083% 3ml nebulizer solution  M: [id number] - [id number]  [employer] Medicare Part D  ----------------------------------------  DENIED  Drugs used in a nebulizer at patient's home is covered under Medicare Part B.

## 2019-11-13 RX ORDER — ALBUTEROL SULFATE 0.83 MG/ML
2.5 SOLUTION RESPIRATORY (INHALATION) EVERY 4 HOURS PRN
Qty: 100 EACH | Refills: 3 | Status: SHIPPED | OUTPATIENT
Start: 2019-11-13 | End: 2020-02-07 | Stop reason: SDUPTHER

## 2019-11-19 PROBLEM — R45.851 SUICIDAL IDEATION: Status: ACTIVE | Noted: 2019-11-19

## 2019-11-19 PROBLEM — J44.1 COPD EXACERBATION: Status: ACTIVE | Noted: 2019-11-19

## 2019-11-19 PROBLEM — F17.200 TOBACCO DEPENDENCE: Status: ACTIVE | Noted: 2019-11-19

## 2019-11-20 PROBLEM — N17.9 AKI (ACUTE KIDNEY INJURY): Status: ACTIVE | Noted: 2019-11-20

## 2019-11-20 PROBLEM — Z75.8 DISCHARGE PLANNING ISSUES: Status: ACTIVE | Noted: 2019-11-20

## 2019-12-02 ENCOUNTER — TELEPHONE (OUTPATIENT)
Dept: FAMILY MEDICINE | Facility: CLINIC | Age: 64
End: 2019-12-02

## 2019-12-02 NOTE — TELEPHONE ENCOUNTER
----- Message from Tova Garcia sent at 12/2/2019 12:59 PM CST -----  Contact: Althea oliver/[facility]  Patient is being discharged tomorrow and needs a 1 wk f/u appt, I could only offer 12/26.   Call back at [phone] (home).  Thanks

## 2019-12-11 ENCOUNTER — PATIENT OUTREACH (OUTPATIENT)
Dept: ADMINISTRATIVE | Facility: HOSPITAL | Age: 64
End: 2019-12-11

## 2019-12-11 NOTE — LETTER
December 11, 2019    Margie Zamudio  [address] [address]             [facility]  [address]  [place] [address]  Phone: [phone] Dear Margie,    Your [facility] primary care team is dedicated to assisting you achieve your health goals.  In order to do so, scheduling your routine screenings and tests are key to your good health.  Our records indicate you may be overdue for your mammogram screening.  Mammography screening can help find breast cancer at an early stage, when it is most likely to be successfully treated.    Luis Mcmullen MD has entered your screening mammogram order.  We encourage you to schedule your appointment at any of our [facility] imaging locations.  To do this online, please visit [email or web address] or contact our office at the number provided below and we will be more than happy to assist you in scheduling your mammogram.     If you recently had your mammogram screening outside of [facility], please let your primary care team know so that they can update your health record.  Please send a message to your primary care physician via your [facility] account or contact his/her office.     Thank you for letting us care for you,      Sincerely,      Lesa Enriquez, Care Coordinator  [facility]  Phone: [phone]  Fax: [phone]

## 2019-12-11 NOTE — PROGRESS NOTES
Dear Margie,    Your [facility] primary care team is dedicated to assisting you achieve your health goals.  In order to do so, scheduling your routine screenings and tests are key to your good health.  Our records indicate you may be overdue for your mammogram screening.  Mammography screening can help find breast cancer at an early stage, when it is most likely to be successfully treated.    Luis Mcmullen MD has entered your screening mammogram order.  We encourage you to schedule your appointment at any of our [facility] imaging locations.  To do this online, please visit [email or web address] or contact our office at the number provided below and we will be more than happy to assist you in scheduling your mammogram.     If you recently had your mammogram screening outside of [facility], please let your primary care team know so that they can update your health record.  Please send a message to your primary care physician via your [facility] account or contact his/her office.     Thank you for letting us care for you,      Sincerely,      Lesa Enriquez, Care Coordinator  [facility]  Phone: [phone]  Fax: [phone]

## 2019-12-16 ENCOUNTER — TELEPHONE (OUTPATIENT)
Dept: FAMILY MEDICINE | Facility: CLINIC | Age: 64
End: 2019-12-16

## 2019-12-16 NOTE — TELEPHONE ENCOUNTER
----- Message from Sangeeta Mayfield sent at 12/16/2019 11:39 AM CST -----  Contact: patient  Type:  Sooner Apoointment Request    Caller is requesting a sooner appointment.  Caller declined first available appointment listed below.  Caller will not accept being placed on the waitlist and is requesting a message be sent to doctor.    Name of Caller:  Margie  When is the first available appointment?  Pt scheduled for 2/7   Symptoms:  Medication Refills/follow up  Best Call Back Number:    Additional Information:  Please advise thank you

## 2019-12-18 DIAGNOSIS — G89.29 CHRONIC BILATERAL LOW BACK PAIN WITH BILATERAL SCIATICA: ICD-10-CM

## 2019-12-18 DIAGNOSIS — I63.50 CEREBRAL ARTERY OCCLUSION WITH CEREBRAL INFARCTION: ICD-10-CM

## 2019-12-18 DIAGNOSIS — M54.41 CHRONIC BILATERAL LOW BACK PAIN WITH BILATERAL SCIATICA: ICD-10-CM

## 2019-12-18 DIAGNOSIS — M54.42 CHRONIC BILATERAL LOW BACK PAIN WITH BILATERAL SCIATICA: ICD-10-CM

## 2019-12-18 DIAGNOSIS — J41.1 MUCOPURULENT CHRONIC BRONCHITIS: ICD-10-CM

## 2019-12-20 RX ORDER — GABAPENTIN 800 MG/1
400 TABLET ORAL 3 TIMES DAILY
Qty: 90 TABLET | Refills: 3 | Status: SHIPPED | OUTPATIENT
Start: 2019-12-20 | End: 2020-02-07

## 2019-12-20 RX ORDER — FLUTICASONE PROPIONATE AND SALMETEROL 250; 50 UG/1; UG/1
POWDER RESPIRATORY (INHALATION)
Qty: 180 EACH | Refills: 2 | Status: SHIPPED | OUTPATIENT
Start: 2019-12-20 | End: 2020-02-07 | Stop reason: SDUPTHER

## 2019-12-20 RX ORDER — CLOPIDOGREL BISULFATE 75 MG/1
75 TABLET ORAL NIGHTLY
Qty: 90 TABLET | Refills: 3 | Status: SHIPPED | OUTPATIENT
Start: 2019-12-20 | End: 2020-02-07 | Stop reason: SDUPTHER

## 2020-01-03 ENCOUNTER — TELEPHONE (OUTPATIENT)
Dept: FAMILY MEDICINE | Facility: CLINIC | Age: 65
End: 2020-01-03

## 2020-01-03 NOTE — TELEPHONE ENCOUNTER
----- Message from Betina Emery sent at 1/3/2020 12:06 PM CST -----  Contact: pt  Type: Needs Medical Advice    Who Called:      Best Call Back Number:   Additional Information: Requesting a call back from Nurse, regarding requesting a refill for Rx for headaches that she had a while back ,please call pt with advice

## 2020-01-06 NOTE — TELEPHONE ENCOUNTER
----- Message from Sindy Kaplan sent at 1/6/2020  9:49 AM CST -----  Contact: Pt  Type: Needs Medical Advice    Who Called:  PT  Symptoms (please be specific):  headaches  How long has patient had these symptoms:  2 weeks  Pharmacy name and phone #:    [facility] - [place], [place] - [address]  [address] [address] [facility] [address]  [address] [place] [address]  Phone: [phone] Fax: [phone]  Best Call Back Number: [phone]  Additional Information:  Pt is requesting butalbital-acetaminophen-caffeine -40 mg (FIORICET, ESGIC) -40 mg per tablet  Please Advise ---Thank you

## 2020-01-07 RX ORDER — BUTALBITAL, ACETAMINOPHEN AND CAFFEINE 50; 325; 40 MG/1; MG/1; MG/1
1 TABLET ORAL EVERY 6 HOURS
Qty: 60 TABLET | Refills: 3 | Status: SHIPPED | OUTPATIENT
Start: 2020-01-07 | End: 2020-02-07 | Stop reason: SDUPTHER

## 2020-01-16 ENCOUNTER — TELEPHONE (OUTPATIENT)
Dept: FAMILY MEDICINE | Facility: CLINIC | Age: 65
End: 2020-01-16

## 2020-01-16 ENCOUNTER — TELEPHONE (OUTPATIENT)
Dept: ADMINISTRATIVE | Facility: HOSPITAL | Age: 65
End: 2020-01-16

## 2020-01-16 NOTE — TELEPHONE ENCOUNTER
----- Message from Yina Maradiaga sent at 1/16/2020  3:53 PM CST -----  Type:  Patient Returning Call    Who Called:  Patient  Who Left Message for Patient:  Jose Gdinesh  Does the patient know what this is regarding?:  no  Best Call Back Number:  [phone]

## 2020-01-17 ENCOUNTER — TELEPHONE (OUTPATIENT)
Dept: FAMILY MEDICINE | Facility: CLINIC | Age: 65
End: 2020-01-17

## 2020-01-17 NOTE — TELEPHONE ENCOUNTER
----- Message from Gage Dumont sent at 1/17/2020  4:06 PM CST -----  Contact: pt   Type:  Patient Returning Call    Who Called:pt   Who Left Message for Patient:  Unknown   Does the patient know what this is regarding?: maybe   Best Call Back Number:    Additional Information:  Returning a call

## 2020-01-20 ENCOUNTER — TELEPHONE (OUTPATIENT)
Dept: FAMILY MEDICINE | Facility: CLINIC | Age: 65
End: 2020-01-20

## 2020-01-20 DIAGNOSIS — F41.9 ANXIETY: ICD-10-CM

## 2020-01-20 RX ORDER — ALPRAZOLAM 1 MG/1
TABLET ORAL
Qty: 90 TABLET | Refills: 3 | Status: SHIPPED | OUTPATIENT
Start: 2020-01-20 | End: 2020-02-07 | Stop reason: SDUPTHER

## 2020-01-20 NOTE — TELEPHONE ENCOUNTER
----- Message from Mohan Donahue sent at 1/20/2020 12:12 PM CST -----  Contact: pt  Type:  Patient Returning Call    Who Called:  pt  Who Left Message for Patient:  Abeba  Does the patient know what this is regarding?:   Not sure. Thinks maybe about lab work  Best Call Back Number: [phone]  Additional Information:

## 2020-01-21 ENCOUNTER — TELEPHONE (OUTPATIENT)
Dept: FAMILY MEDICINE | Facility: CLINIC | Age: 65
End: 2020-01-21

## 2020-01-23 ENCOUNTER — TELEPHONE (OUTPATIENT)
Dept: FAMILY MEDICINE | Facility: CLINIC | Age: 65
End: 2020-01-23

## 2020-01-23 DIAGNOSIS — E78.2 MIXED HYPERLIPIDEMIA: Primary | ICD-10-CM

## 2020-01-23 NOTE — TELEPHONE ENCOUNTER
----- Message from Elbert Hall sent at 1/23/2020  4:37 PM CST -----  Contact: Ptnt   [phone]  Type: Needs Medical Advice    Who Called:  Ptnt   [phone]    Additional Information: Advised needs lab orders issued for scheduling before her up coming appmnt on 02-07-20 at 9:40 AM.

## 2020-02-03 ENCOUNTER — TELEPHONE (OUTPATIENT)
Dept: FAMILY MEDICINE | Facility: CLINIC | Age: 65
End: 2020-02-03

## 2020-02-03 NOTE — TELEPHONE ENCOUNTER
----- Message from Mohan Donahue sent at 2/3/2020  9:04 AM CST -----  Contact: pt's group home administrator Tyrone  Type: Needs Medical Advice    Who Called:  Tyrone Blanco Call Back Number: [phone]  Additional Information: Needs these the pt's ;lab orders sent over the [facility] in [place], so the pt can have her labs done today. Please call to advise.

## 2020-02-04 ENCOUNTER — TELEPHONE (OUTPATIENT)
Dept: FAMILY MEDICINE | Facility: CLINIC | Age: 65
End: 2020-02-04

## 2020-02-04 NOTE — TELEPHONE ENCOUNTER
----- Message from Linn Garcia sent at 2/4/2020  9:57 AM CST -----  Contact: Umm oliver/ [facility] [phone]  Umm is requesting a list of medication for pt. Pt is being seen at today 10:30 AM and physician needs to know which medications were changed. Please advise.

## 2020-02-06 ENCOUNTER — TELEPHONE (OUTPATIENT)
Dept: FAMILY MEDICINE | Facility: CLINIC | Age: 65
End: 2020-02-06

## 2020-02-06 NOTE — TELEPHONE ENCOUNTER
----- Message from Lamine Delgado sent at 2/6/2020 12:46 PM CST -----  Contact: Patient  Type: Needs Medical Advice    Who Called:  Patient  Best Call Back Number: [phone]; [phone]  Additional Information: Patient would like to discuss labs. Please call to advise. Thanks!

## 2020-02-07 ENCOUNTER — PATIENT OUTREACH (OUTPATIENT)
Dept: ADMINISTRATIVE | Facility: HOSPITAL | Age: 65
End: 2020-02-07

## 2020-02-07 ENCOUNTER — OFFICE VISIT (OUTPATIENT)
Dept: FAMILY MEDICINE | Facility: CLINIC | Age: 65
End: 2020-02-07
Payer: MEDICARE

## 2020-02-07 VITALS
BODY MASS INDEX: 19.75 KG/M2 | HEIGHT: 60 IN | DIASTOLIC BLOOD PRESSURE: 80 MMHG | HEART RATE: 87 BPM | SYSTOLIC BLOOD PRESSURE: 118 MMHG | OXYGEN SATURATION: 97 % | TEMPERATURE: 98 F | WEIGHT: 100.63 LBS

## 2020-02-07 DIAGNOSIS — I63.519 CEREBROVASCULAR ACCIDENT (CVA) DUE TO OCCLUSION OF MIDDLE CEREBRAL ARTERY, UNSPECIFIED BLOOD VESSEL LATERALITY: ICD-10-CM

## 2020-02-07 DIAGNOSIS — E78.2 MIXED HYPERLIPIDEMIA: ICD-10-CM

## 2020-02-07 DIAGNOSIS — K21.9 GASTROESOPHAGEAL REFLUX DISEASE WITHOUT ESOPHAGITIS: ICD-10-CM

## 2020-02-07 DIAGNOSIS — J44.1 COPD WITH ACUTE EXACERBATION: Primary | ICD-10-CM

## 2020-02-07 DIAGNOSIS — Z12.31 BREAST CANCER SCREENING BY MAMMOGRAM: ICD-10-CM

## 2020-02-07 DIAGNOSIS — J41.1 MUCOPURULENT CHRONIC BRONCHITIS: ICD-10-CM

## 2020-02-07 DIAGNOSIS — G43.009 MIGRAINE WITHOUT AURA AND WITHOUT STATUS MIGRAINOSUS, NOT INTRACTABLE: ICD-10-CM

## 2020-02-07 DIAGNOSIS — F41.9 ANXIETY: ICD-10-CM

## 2020-02-07 DIAGNOSIS — F32.4 MAJOR DEPRESSIVE DISORDER WITH SINGLE EPISODE, IN PARTIAL REMISSION: ICD-10-CM

## 2020-02-07 DIAGNOSIS — I63.50 CEREBRAL ARTERY OCCLUSION WITH CEREBRAL INFARCTION: ICD-10-CM

## 2020-02-07 DIAGNOSIS — E86.0 DEHYDRATION: ICD-10-CM

## 2020-02-07 LAB
ALBUMIN SERPL-MCNC: 4 G/DL (ref 3.8–4.8)
ALBUMIN/GLOB SERPL: 1.6 {RATIO} (ref 1.2–2.2)
ALP SERPL-CCNC: 121 IU/L (ref 39–117)
ALT SERPL-CCNC: 12 IU/L (ref 0–32)
AST SERPL-CCNC: 22 IU/L (ref 0–40)
BASOPHILS # BLD AUTO: 0.1 X10E3/UL (ref 0–0.2)
BASOPHILS NFR BLD AUTO: 1 %
BILIRUB SERPL-MCNC: 0.3 MG/DL (ref 0–1.2)
BUN SERPL-MCNC: 6 MG/DL (ref 8–27)
BUN/CREAT SERPL: 8 (ref 12–28)
CALCIUM SERPL-MCNC: 9.6 MG/DL (ref 8.7–10.3)
CHLORIDE SERPL-SCNC: 104 MMOL/L (ref 96–106)
CHOLEST SERPL-MCNC: 167 MG/DL (ref 100–199)
CO2 SERPL-SCNC: 25 MMOL/L (ref 20–29)
CREAT SERPL-MCNC: 0.78 MG/DL (ref 0.57–1)
EOSINOPHIL # BLD AUTO: 0.4 X10E3/UL (ref 0–0.4)
EOSINOPHIL NFR BLD AUTO: 3 %
ERYTHROCYTE [DISTWIDTH] IN BLOOD BY AUTOMATED COUNT: 15.4 % (ref 11.7–15.4)
GLOBULIN SER CALC-MCNC: 2.5 G/DL (ref 1.5–4.5)
GLUCOSE SERPL-MCNC: 86 MG/DL (ref 65–99)
HCT VFR BLD AUTO: 52.5 % (ref 34–46.6)
HDLC SERPL-MCNC: 66 MG/DL
HGB BLD-MCNC: 16.6 G/DL (ref 11.1–15.9)
IMM GRANULOCYTES # BLD AUTO: 0.1 X10E3/UL (ref 0–0.1)
IMM GRANULOCYTES NFR BLD AUTO: 1 %
LDLC SERPL CALC-MCNC: 75 MG/DL (ref 0–99)
LYMPHOCYTES # BLD AUTO: 2.7 X10E3/UL (ref 0.7–3.1)
LYMPHOCYTES NFR BLD AUTO: 24 %
MCH RBC QN AUTO: 31 PG (ref 26.6–33)
MCHC RBC AUTO-ENTMCNC: 31.6 G/DL (ref 31.5–35.7)
MCV RBC AUTO: 98 FL (ref 79–97)
MONOCYTES # BLD AUTO: 1.2 X10E3/UL (ref 0.1–0.9)
MONOCYTES NFR BLD AUTO: 10 %
NEUTROPHILS # BLD AUTO: 7.1 X10E3/UL (ref 1.4–7)
NEUTROPHILS NFR BLD AUTO: 61 %
PLATELET # BLD AUTO: 353 X10E3/UL (ref 150–450)
POTASSIUM SERPL-SCNC: 3.9 MMOL/L (ref 3.5–5.2)
PROT SERPL-MCNC: 6.5 G/DL (ref 6–8.5)
RBC # BLD AUTO: 5.36 X10E6/UL (ref 3.77–5.28)
SODIUM SERPL-SCNC: 146 MMOL/L (ref 134–144)
TRIGL SERPL-MCNC: 132 MG/DL (ref 0–149)
VLDLC SERPL CALC-MCNC: 26 MG/DL (ref 5–40)
WBC # BLD AUTO: 11.5 X10E3/UL (ref 3.4–10.8)

## 2020-02-07 PROCEDURE — 99999 PR PBB SHADOW E&M-EST. PATIENT-LVL III: CPT | Mod: PBBFAC,,, | Performed by: INTERNAL MEDICINE

## 2020-02-07 PROCEDURE — 99999 PR PBB SHADOW E&M-EST. PATIENT-LVL III: ICD-10-PCS | Mod: PBBFAC,,, | Performed by: INTERNAL MEDICINE

## 2020-02-07 PROCEDURE — 99213 OFFICE O/P EST LOW 20 MIN: CPT | Mod: PBBFAC,PO | Performed by: INTERNAL MEDICINE

## 2020-02-07 PROCEDURE — 99214 OFFICE O/P EST MOD 30 MIN: CPT | Mod: S$PBB,,, | Performed by: INTERNAL MEDICINE

## 2020-02-07 PROCEDURE — 99214 PR OFFICE/OUTPT VISIT, EST, LEVL IV, 30-39 MIN: ICD-10-PCS | Mod: S$PBB,,, | Performed by: INTERNAL MEDICINE

## 2020-02-07 RX ORDER — PRAVASTATIN SODIUM 10 MG/1
10 TABLET ORAL NIGHTLY
Qty: 90 TABLET | Refills: 3 | Status: SHIPPED | OUTPATIENT
Start: 2020-02-07

## 2020-02-07 RX ORDER — ALBUTEROL SULFATE 0.83 MG/ML
2.5 SOLUTION RESPIRATORY (INHALATION) EVERY 4 HOURS PRN
Qty: 100 EACH | Refills: 3 | Status: SHIPPED | OUTPATIENT
Start: 2020-02-07 | End: 2021-02-06

## 2020-02-07 RX ORDER — BUTALBITAL, ACETAMINOPHEN AND CAFFEINE 50; 325; 40 MG/1; MG/1; MG/1
1 TABLET ORAL EVERY 6 HOURS
Qty: 90 TABLET | Refills: 3 | Status: SHIPPED | OUTPATIENT
Start: 2020-02-07 | End: 2020-05-02

## 2020-02-07 RX ORDER — PANTOPRAZOLE SODIUM 40 MG/1
40 TABLET, DELAYED RELEASE ORAL DAILY
Qty: 90 TABLET | Refills: 3 | Status: SHIPPED | OUTPATIENT
Start: 2020-02-07 | End: 2021-02-06

## 2020-02-07 RX ORDER — ALBUTEROL SULFATE 90 UG/1
2 AEROSOL, METERED RESPIRATORY (INHALATION) EVERY 4 HOURS PRN
Qty: 18 G | Refills: 6 | Status: SHIPPED | OUTPATIENT
Start: 2020-02-07

## 2020-02-07 RX ORDER — ALPRAZOLAM 1 MG/1
1 TABLET ORAL 3 TIMES DAILY
Qty: 90 TABLET | Refills: 3 | Status: SHIPPED | OUTPATIENT
Start: 2020-02-07

## 2020-02-07 RX ORDER — FLUTICASONE PROPIONATE AND SALMETEROL 250; 50 UG/1; UG/1
1 POWDER RESPIRATORY (INHALATION) 2 TIMES DAILY
Qty: 180 EACH | Refills: 3 | Status: SHIPPED | OUTPATIENT
Start: 2020-02-07

## 2020-02-07 RX ORDER — CLOPIDOGREL BISULFATE 75 MG/1
75 TABLET ORAL NIGHTLY
Qty: 90 TABLET | Refills: 3 | Status: SHIPPED | OUTPATIENT
Start: 2020-02-07

## 2020-02-07 RX ORDER — DULOXETIN HYDROCHLORIDE 30 MG/1
30 CAPSULE, DELAYED RELEASE ORAL DAILY
Qty: 90 CAPSULE | Refills: 3 | Status: SHIPPED | OUTPATIENT
Start: 2020-02-07

## 2020-02-07 RX ORDER — TIOTROPIUM BROMIDE 18 UG/1
18 CAPSULE ORAL; RESPIRATORY (INHALATION) DAILY
Qty: 90 CAPSULE | Refills: 3 | Status: SHIPPED | OUTPATIENT
Start: 2020-02-07 | End: 2020-02-12 | Stop reason: CLARIF

## 2020-02-07 NOTE — PROGRESS NOTES
Patient ID: Margie Zamudio     Chief Complaint:   Chief Complaint   Patient presents with    Review labs    Refill for all medication     Changing to [facility]        HPI: Follow up for COPD and hyperlipidemia. Since our last office visit, she was hospitalized for suicidal ideation but she emphatically denies any of those feelings. Topamax was started for migraine PPx but it doesn't work- will stop it. She takes Fioricet as needed. Labs reviewed: + elevated Hgb due to dehydration but lipids Controlled. She declines Mammo for now. Breathing at baseline. Ambulating w/ cane.     Review of Systems   Constitutional: Negative.    HENT: Negative.    Eyes: Negative.    Respiratory: Positive for shortness of breath.    Cardiovascular: Negative.    Gastrointestinal: Negative.    Endocrine: Negative.    Genitourinary: Negative.    Musculoskeletal: Negative.    Skin: Negative.    Allergic/Immunologic: Negative.    Neurological: Negative.    Hematological: Negative.    Psychiatric/Behavioral: Negative.           Objective:      Physical Exam   Physical Exam   Constitutional: She is oriented to person, place, and time. She appears well-developed and well-nourished.   HENT:   Head: Normocephalic and atraumatic.   Nose: Nose normal.   Mouth/Throat: Oropharynx is clear and moist.   Eyes: Pupils are equal, round, and reactive to light. Conjunctivae and EOM are normal.   Neck: Normal range of motion. Neck supple.   Cardiovascular: Normal rate, regular rhythm, normal heart sounds and intact distal pulses.   Pulmonary/Chest: Effort normal.   Decreased Breath Sounds bilaterally but clear and without wheezes or rhonchi   Abdominal: Soft. Bowel sounds are normal.   Musculoskeletal:   Ambulates with cane     Neurological: She is alert and oriented to person, place, and time.   Skin: Skin is warm and dry. Capillary refill takes less than 2 seconds.   Psychiatric: She has a normal mood and affect. Her behavior is normal. Judgment  and thought content normal.   Nursing note and vitals reviewed.      Current Outpatient Medications:     albuterol (PROVENTIL) 2.5 mg /3 mL (0.083 %) nebulizer solution, Take 3 mLs (2.5 mg total) by nebulization every 4 (four) hours as needed for Wheezing or Shortness of Breath. Rescue, Disp: 100 each, Rfl: 3    albuterol (PROVENTIL/VENTOLIN HFA) 90 mcg/actuation inhaler, Inhale 2 puffs into the lungs every 4 (four) hours as needed for Wheezing or Shortness of Breath., Disp: 18 g, Rfl: 6    ALPRAZolam (XANAX) 1 MG tablet, Take 1 tablet (1 mg total) by mouth 3 (three) times daily., Disp: 90 tablet, Rfl: 3    aspirin (ECOTRIN) 81 MG EC tablet, Take 81 mg by mouth once daily., Disp: , Rfl:     butalbital-acetaminophen-caffeine -40 mg (FIORICET, ESGIC) -40 mg per tablet, Take 1 tablet by mouth every 6 (six) hours., Disp: 90 tablet, Rfl: 3    clopidogreL (PLAVIX) 75 mg tablet, Take 1 tablet (75 mg total) by mouth nightly., Disp: 90 tablet, Rfl: 3    DULoxetine (CYMBALTA) 30 MG capsule, Take 1 capsule (30 mg total) by mouth once daily., Disp: 90 capsule, Rfl: 3    fluticasone-salmeterol diskus inhaler 250-50 mcg, Inhale 1 puff into the lungs 2 (two) times daily. Controller, Disp: 180 each, Rfl: 3    pantoprazole (PROTONIX) 40 MG tablet, Take 1 tablet (40 mg total) by mouth once daily., Disp: 90 tablet, Rfl: 3    pravastatin (PRAVACHOL) 10 MG tablet, Take 1 tablet (10 mg total) by mouth every evening., Disp: 90 tablet, Rfl: 3    tiotropium (SPIRIVA) 18 mcg inhalation capsule, Inhale 1 capsule (18 mcg total) into the lungs once daily., Disp: 90 capsule, Rfl: 3         Vitals:   Vitals:    02/07/20 1011   BP: 118/80   Pulse: 87   Temp: 98.1 °F (36.7 °C)   TempSrc: Temporal   SpO2: 97%   Weight: 45.7 kg (100 lb 10.2 oz)   Height: 5' (1.524 m)      Assessment:       Patient Active Problem List    Diagnosis Date Noted    Migraine without aura and without status migrainosus, not intractable 02/07/2020     Gastroesophageal reflux disease without esophagitis 02/07/2020    Discharge planning issues 11/20/2019    MARCUS (acute kidney injury) 11/20/2019    COPD exacerbation 11/19/2019    Tobacco dependence 11/19/2019    Suicidal ideation 11/19/2019    SOB (shortness of breath)     COPD with acute exacerbation 11/07/2019    Anxiety 07/03/2019    Major depressive disorder with single episode, in partial remission 07/03/2019    Mixed hyperlipidemia 07/03/2019    Insomnia due to medical condition 07/03/2019    Breast cancer screening 07/03/2019    Pre-operative respiratory examination 08/31/2015    Carotid stenosis, symptomatic, with infarction 08/30/2015    Cerebral artery occlusion with cerebral infarction 08/30/2015    Unspecified essential hypertension     Chronic prescription benzodiazepine use 08/29/2015    Hypokalemia 08/29/2015    Smoker 08/29/2015    Cerebrovascular accident (CVA) due to occlusion of middle cerebral artery 08/28/2015    Overdose of opiate or related narcotic 02/23/2014    Gastroenteritis 10/14/2013    Chronic back pain     Narcotic dependence     Osteoporosis     COPD (chronic obstructive pulmonary disease) 10/13/2013          Plan:       Margie was seen today for review labs and refill for all medication.    Diagnoses and all orders for this visit:    COPD with acute exacerbation  -     albuterol (PROVENTIL) 2.5 mg /3 mL (0.083 %) nebulizer solution; Take 3 mLs (2.5 mg total) by nebulization every 4 (four) hours as needed for Wheezing or Shortness of Breath. Rescue  -     tiotropium (SPIRIVA) 18 mcg inhalation capsule; Inhale 1 capsule (18 mcg total) into the lungs once daily.    Mucopurulent chronic bronchitis  -     albuterol (PROVENTIL/VENTOLIN HFA) 90 mcg/actuation inhaler; Inhale 2 puffs into the lungs every 4 (four) hours as needed for Wheezing or Shortness of Breath.  -     fluticasone-salmeterol diskus inhaler 250-50 mcg; Inhale 1 puff into the lungs 2 (two) times  daily. Controller    Anxiety  -     ALPRAZolam (XANAX) 1 MG tablet; Take 1 tablet (1 mg total) by mouth 3 (three) times daily.  Controlled     Cerebral artery occlusion with cerebral infarction  -     clopidogreL (PLAVIX) 75 mg tablet; Take 1 tablet (75 mg total) by mouth nightly.    Major depressive disorder with single episode, in partial remission  -     DULoxetine (CYMBALTA) 30 MG capsule; Take 1 capsule (30 mg total) by mouth once daily.  Controlled     Mixed hyperlipidemia  -     pravastatin (PRAVACHOL) 10 MG tablet; Take 1 tablet (10 mg total) by mouth every evening.  Controlled     Migraine without aura and without status migrainosus, not intractable  -     butalbital-acetaminophen-caffeine -40 mg (FIORICET, ESGIC) -40 mg per tablet; Take 1 tablet by mouth every 6 (six) hours.    Gastroesophageal reflux disease without esophagitis  -     pantoprazole (PROTONIX) 40 MG tablet; Take 1 tablet (40 mg total) by mouth once daily.  Controlled     Cerebrovascular accident (CVA) due to occlusion of middle cerebral artery, unspecified blood vessel laterality    Breast cancer screening by mammogram  -     Mammo Digital Screening Bilat w/ Humberto; Future  -     Mammo Digital Screening Bilat w/ Humberto

## 2020-02-10 ENCOUNTER — DOCUMENTATION ONLY (OUTPATIENT)
Dept: FAMILY MEDICINE | Facility: CLINIC | Age: 65
End: 2020-02-10

## 2020-02-10 NOTE — PROGRESS NOTES
PA:   Spiriva Handihaler    [facility] key:   [id number]  Case ID:  [id number]  [employer]  -------------------------------------  Determination:   Denied  The requested drug is not on the plan's formulary.   Covered alternatives:    Atrovent HFA  (limit 2 inhalers per 30 day)  Incruse Ellipta (limit 30 blister per 30 days)

## 2020-02-11 ENCOUNTER — TELEPHONE (OUTPATIENT)
Dept: FAMILY MEDICINE | Facility: CLINIC | Age: 65
End: 2020-02-11

## 2020-02-11 DIAGNOSIS — J44.1 COPD WITH ACUTE EXACERBATION: Primary | ICD-10-CM

## 2020-02-11 NOTE — TELEPHONE ENCOUNTER
PA for Spiriva Handihaler has been  Denied  The requested drug is not on the plan's formulary.   Covered alternatives:    Atrovent HFA  (limit 2 inhalers per 30 day)  Incruse Ellipta (limit 30 blister per 30 days)

## 2020-03-18 ENCOUNTER — DOCUMENTATION ONLY (OUTPATIENT)
Dept: FAMILY MEDICINE | Facility: CLINIC | Age: 65
End: 2020-03-18

## 2020-03-18 NOTE — PROGRESS NOTES
PA:  Albuterol Sulfate Nebulizer Solution    [employer] PA form faxed to [phone].  --------------------------------------------  DENIED under Part D    APPROVED under Part B since the medication is being used with a nebulizer in patient's home.

## 2020-03-30 ENCOUNTER — TELEPHONE (OUTPATIENT)
Dept: FAMILY MEDICINE | Facility: CLINIC | Age: 65
End: 2020-03-30

## 2020-03-30 DIAGNOSIS — T14.8XXA BRUISING: Primary | ICD-10-CM

## 2020-03-30 NOTE — TELEPHONE ENCOUNTER
The nurse at the facility called stating Mrs. Zamudio has lots of bruising and her skin is really thin. The are asking if she needs to be on both Plavix and Aspirin. She has [facility] if you need labs. Please advise?

## 2020-03-30 NOTE — TELEPHONE ENCOUNTER
Let us stop the aspirin and continue the Plavix due to her easy bruising. CBC ordered as well. Ask [place] to do it.

## 2020-03-30 NOTE — TELEPHONE ENCOUNTER
----- Message from Mohan Donahue sent at 3/30/2020 10:22 AM CDT -----  Contact: pt's friend Reese  Type: Needs Medical Advice    Who Called:  Reese Blanco Call Back Number: [phone]  Additional Information: Would like to discuss the dosage of the medication clopidogreL (PLAVIX) 75 mg tablet and aspirin (ECOTRIN) 81 MG EC tablet. Please call to advise.

## 2020-04-14 ENCOUNTER — PATIENT OUTREACH (OUTPATIENT)
Dept: ADMINISTRATIVE | Facility: HOSPITAL | Age: 65
End: 2020-04-14

## 2020-04-14 NOTE — PROGRESS NOTES
COVID-19 Workflow non-compliant chart audits  Mammogram not in DIS 04/14/2020  Chart review completed 04/14/2020.  Care Everywhere updates requested and reviewed.  Immunizations reconciled. Media reviewed.

## 2020-05-02 DIAGNOSIS — G43.009 MIGRAINE WITHOUT AURA AND WITHOUT STATUS MIGRAINOSUS, NOT INTRACTABLE: ICD-10-CM

## 2020-05-02 RX ORDER — BUTALBITAL, ACETAMINOPHEN AND CAFFEINE 50; 325; 40 MG/1; MG/1; MG/1
TABLET ORAL
Qty: 90 TABLET | Refills: 3 | Status: SHIPPED | OUTPATIENT
Start: 2020-05-02

## 2020-05-29 ENCOUNTER — PATIENT OUTREACH (OUTPATIENT)
Dept: ADMINISTRATIVE | Facility: HOSPITAL | Age: 65
End: 2020-05-29

## 2020-05-29 NOTE — PROGRESS NOTES
Chart review completed 05/29/2020.  Care Everywhere updates requested and reviewed.  Immunizations reconciled. Media reviewed.     DIS reviewed.  Letter mailed to patient.    Health Maintenance Due   Topic Date Due    Mammogram  07/12/1996    Shingles Vaccine (1 of 2) 07/12/2006

## 2020-05-29 NOTE — LETTER
May 29, 2020    Margie Zamudio  [address]  [place] [address]         [facility]  [address]  [facility] [address]  Phone: [phone] [facility] is committed to your [facility].  To help you get the most out of each of your visits, we will review your information to make sure you are up to date on all of your recommended tests and/or procedures.      Luis Mcmullen MD  has found that your chart shows you may be due for the following:    Mammogram     If you have had any of the above done at another facility, please bring the records with you or Fax them to [phone] so that your record at [facility] will be complete. If you have not had any of these tests or procedures done recently and would like to complete this testing ,  please call [phone] or send a message through your [facility] portal to your provider's office.     If you have an upcoming scheduled appointment for the above test and/or procedures, please disregard this letter.    If you are currently taking medication, please bring it with you to your appointment for review.      Thank you for letting us care for you,      Mayra Harris, Care Coordinator  [facility]  Phone: [phone]  Fax: [phone]

## 2020-07-17 ENCOUNTER — TELEPHONE (OUTPATIENT)
Dept: HEMATOLOGY/ONCOLOGY | Facility: CLINIC | Age: 65
End: 2020-07-17

## 2020-07-17 NOTE — TELEPHONE ENCOUNTER
LVM for patient to return call.    ----- Message from Sakina Cordoba LPN sent at 7/17/2020 11:19 AM CDT -----    ----- Message -----  From: Sandy Mendoza  Sent: 7/17/2020  10:38 AM CDT  To: [facility] Staff    Type: Needs Medical Advice  Who Called:  Reese with [facility]  Best Call Back Number: [phone]  Additional Information: please give call back to schedule new patient appt. thanks

## 2020-09-11 NOTE — PROGRESS NOTES
PA:   Albuterol, Sulfate 0.083% Nebulizer solution    [facility] key:   [id number]  Case ID:    [id number]  [employer]  --------------------------------------  Determination: Denied under Part D  Drugs used in a nebulizer are covered under Part B   September 11, 2020       Krzysztof Mon MD  [address] [facility] [id number]  Via In Basket      Patient: Mukund Kimball   YOB: 1944   Date of Visit: 9/11/2020       Dear Dr. Mon:    Thank you for referring Mukund Kimball to me for evaluation. Below are my notes for this visit with him.    If you have questions, please do not hesitate to call me. I look forward to following your patient along with you.      Sincerely,        Trisha Siddiqui MD        CC: No Recipients  Miguelito SIDNEY DO [place]  9/11/2020  2:15 PM  Incomplete  [facility] Cardiology    09/10/20   Mukund Kimball  1944  [id number]   Krzysztof Mon MD    Pertinent PMH:    1. NICM, LVEF ~ 36%  2. Persistent AF  3. HTN     HPI:    Mukund Kimball is a pleasant male 75 years old with a known past medical history of heart failure reduced ejection fraction, LVEF 36%, hypertension, hypothyroidism, BPH, sleep apnea, A. fib was referred to me by EP for evaluation and management of heart failure.     Subjective:    Mukund is here for the follow up of his NYHA Class II/ACC stage C heart failure reduced ejection fraction and atrial fibrillation.  He saw Dr Lopez recently for consultation on his atrial fibrillation to see if rhythm control with DCCV or Ablation is a viable option to help improve LVEF.  He was offered DCCV, however he does not want this done as he essentially has no heart failure symptoms.  He walks briskly 25 minutes every day at home without limitation, does stairs while carrying laundry without issue, and does all of his ADLs without significant limitation.  He takes his blood pressure daily and it is around  systolic with an average 100 mmHg systolic.  His weight is stable at home. He watches his diet closely for sodium and calorie intake.  He has no PND, orthopnea, abdominal distention, KILLIAN/SOB, new or worsening peripheral edema.    Review of systems:    General/Constitutional: Neg  except HPI  EYE: Neg except HPI  ENT: Neg except HPI  CV: Neg except HPI  Pulm: Neg except HPI  GI: Neg except HPI  : Neg except HPI  MS: Neg except HPI  Skin: Neg except HPI  Heme/Lymph: Neg except HPI  Neuro: Neg except HPI  Endo: Neg except HPI  Allergy/Immuno: Neg except HPI  Psych: Neg except HPI  Additional ROS: All other ROS negative except as noted in HPI     Past Medical History:   Diagnosis Date   • Acid reflux    • Acute systolic heart failure (CMS/HCC) 3/1/2019   • Anxiety    • Basal cell carcinoma    • Dyslipidemia    • Enlarged prostate    • Generalized anxiety disorder with panic attacks 3/1/2019   • GI bleed    • Hyperlipidemia    • Hypertension    • LAFB (left anterior fascicular block)    • LVH (left ventricular hypertrophy) due to hypertensive disease    • PAF (paroxysmal atrial fibrillation) (CMS/HCC)    • Paroxysmal atrial fibrillation (CMS/HCC)    • RBBB (right bundle branch block)    • SVT (supraventricular tachycardia) (CMS/[facility])      s/p ablation 8/5/09       Past Surgical History:   Procedure Laterality Date   • Ablate heart dysrhythm focus  08/05/2009     by Dr. Sven Choi from [facility]     • Angioplasty     • Cyst removal     • Hernia repair      x3   • Tonsillectomy       Family History   Problem Relation Age of Onset   • Heart disease Father          MI in his 60s    • Diabetes Father    • Hypertension Mother      Social History     Tobacco Use   • Smoking status: Never Smoker   • Smokeless tobacco: Never Used   Substance Use Topics   • Alcohol use: Yes     Comment: rarely   • Drug use: Yes     Frequency: 7.0 times per week     Types: Prescription Drugs     Physical exam:  Physical Exam   Constitutional: He is oriented to person, place, and time. He appears well-developed and well-nourished.   HENT:   Head: Normocephalic.   Eyes: Pupils are equal, round, and reactive to light. Conjunctivae are normal.   Neck: Normal range of motion. No JVD present.   Pulmonary/Chest: Effort normal and  breath sounds normal. No respiratory distress. He has no rales.   Abdominal: Soft. He exhibits no distension. There is no abdominal tenderness.   Musculoskeletal:         General: Edema (bilaterally, left>right) present.   Neurological: He is alert and oriented to person, place, and time.   Skin: Skin is warm. No erythema.         Current Outpatient Medications   Medication Sig Dispense Refill   • LORazepam (ATIVAN) 0.5 MG tablet TAKE 1 TABLET BY MOUTH AT BEDTIME AS NEEDED FOR ANXIETY 90 tablet 0   • simvastatin (ZOCOR) 20 MG tablet Take 1 tablet by mouth in the evening 90 tablet 0   • ezetimibe (ZETIA) 10 MG tablet Take 1 tablet by mouth once daily 90 tablet 0   • spironolactone (ALDACTONE) 25 MG tablet Take 1 tablet by mouth once daily 90 tablet 3   • metoPROLOL succinate (TOPROL-XL) 100 MG 24 hr tablet Take 1 tablet by mouth 2 times daily. 180 tablet 3   • digoxin (LANOXIN) 0.125 MG tablet Take 1 tablet by mouth daily. 60 tablet 3   • furosemide (LASIX) 20 MG tablet Take 20 mg by mouth daily.      • levothyroxine (SYNTHROID, LEVOTHROID) 50 MCG tablet Take as directed (Patient taking differently: Take 75 mcg by mouth. Take 75mg 6 days a week and 50mg 1 day a week.) 132 tablet 3   • finasteride (PROSCAR) 5 MG tablet TAKE 1 TABLET BY MOUTH ONCE DAILY 90 tablet 3   • XARELTO 20 MG Tab TAKE 1 TABLET BY MOUTH ONCE DAILY WITH DINNER 90 tablet 3   • sacubitril-valsartan (ENTRESTO) 49-51 MG per tablet Take 1 tablet by mouth 2 times daily. 180 tablet 3     No current facility-administered medications for this visit.      Recent labs:     Sodium (mmol/L)   Date Value   05/13/2020 139     Potassium (mmol/L)   Date Value   05/13/2020 4.4     Chloride (mmol/L)   Date Value   05/13/2020 104     Glucose (mg/dL)   Date Value   05/13/2020 86     CALCIUM (mg/dL)   Date Value   05/13/2020 9.0     Carbon Dioxide (mmol/L)   Date Value   05/13/2020 26     BUN (mg/dL)   Date Value   05/13/2020 26 (H)     Creatinine (mg/dL)   Date Value    05/13/2020 0.75       WBC (K/mcL)   Date Value   02/12/2020 6.1     RBC (mil/mcL)   Date Value   02/12/2020 5.50     HCT (%)   Date Value   02/12/2020 47.0     HGB (g/dL)   Date Value   02/12/2020 15.3     PLT (K/mcL)   Date Value   02/12/2020 242       TSH (mcUnits/mL)   Date Value   05/13/2020 3.229     No results found for: BNP    Encounter Date: 09/08/20   ELECTROCARDIOGRAM 12-LEAD    Impression    AF. QRS 122ms. LAD. 92 bpm.        Echocardiogram  1. Left ventricle: The cavity size is at the upper limits of     normal. Wall thickness is normal. Systolic function is severely     reduced. The estimated ejection fraction is 36%, by biplane     method of disks. Severe global hypokinesis.  2. Ventricular septum: Septal motion is dyssynergic.  3. Aortic valve: Mild regurgitation.  4. Aortic root: The aortic root is dilated ~ 4.1 cm  5. Mitral valve: The annulus is mildly calcified. Mild     regurgitation.  6. Right atrium: The atrium is mildly dilated.  7. Tricuspid valve: Mild regurgitation.  8. Pulmonary arteries: Systolic pressure is within the normal     range.    Stress test:  4/2019  Abnormal myocardial perfusion imaging with evidence of a lateral infarction  Compared to prior study no prior study available for comparison     Cardiac cath:   1.  Left ventricular pressure was 115/4 with a left ventricular end-diastolic pressure of 9 to 10 mmHg.  2.  There was no significant gradient noted on pullback of the catheter.  3.  The right atrial pressure was 4/6/5(a/v/m).  4.  The right ventricular pressure was 28/0 with a right ventricular end-diastolic pressure of 4 mmHg.  5.  The pulmonary arterial pressure was 26/8 mmHg with a mean pressure of 20 mmHg.  6.  The pulmonary capillary wedge pressure was 10 mmHg.  7 the arterial saturation was 96%.  8.  The pulmonary arterial saturation was 76%.  9.  The cardiac output was 5.6 L/min with a cardiac index of 3 L/min/m² per Castro's method.  Angiographically normal  coronaries    Holter Impression:      1. The patient remained in atrial fibrillation with an average heart rate of 77bpm  2. There were 10 pauses with the maximum RR interval in atrial fibrillation of 2.871 seconds not correlated to patient triggered events.   3. The patient's Holter diary was returned.  2 symptom events were recorded, labeled as \"chest discomfort\" and \"unlabeled\". These correlated with atrial fibrillation with a rapid ventricular response, with a heart rate up to 163bpm.     CPET: N/A    Impression and plan:    Mukund Kimball is a 75 year old male with a past medical history of HFrEF, nonischemic cardiomyopathy, persistent atrial fibrillation, hypertension who was referred to me for evaluation and management of heart failure.    1. Chronic combined systolic and diastolic heart failure  Etiology: Nonischemic  Fluid status: Euvolemic  ACC stage C, NYHA 2-3  LVEF 36%, LVIDD 5.6 cm  Medications: Entresto 49/51 BID, Spironolactone 25 OD, metoprolol 100 XL BID, digoxin  Device therapy: None,  ms, IVCD  Inotropes: None  Advanced therapies: Not evaluated for    2.  Persistent atrial fibrillation  Rate controlled  On Xarelto  Had a telephone visit with EP and there was discussion about considering dofetilide loading and cardioversion before considering ablation  He has a follow-up visit with Dr. Lopez in September    3.  Hypertension  Blood pressure at goal    4.  Hypothyroidism  On levothyroxine    5.  Dyslipidemia  On a statin    Recommendations    Mukund is doing quite well and is relatively asymptomatic from his low ejection fraction.  He has been on GDMT with entresto for one year and feels better.  · -Plan to get a new TTE to evaluate LVEF for recovery.  · If the LVEF has improved, then we will just continue GDMT.  · If the LVEF is the same or decreased, then we will revisit the idea of DCCV or ablation.  · Agree with labs ordered by PCP Dr Mon.  No further labs needed at this time  other than what is ordered.    Follow-up in clinic in 4 months    Miguelito Taylor DO, MS, MS  Cardiology Fellow PGY-4

## 2023-05-08 ENCOUNTER — HOSPITAL ENCOUNTER (INPATIENT)
Facility: HOSPITAL | Age: 68
LOS: 3 days | Discharge: HOSPICE | DRG: 871 | End: 2023-05-11
Attending: EMERGENCY MEDICINE | Admitting: INTERNAL MEDICINE
Payer: MEDICARE

## 2023-05-08 DIAGNOSIS — A41.9 SEPSIS WITH ACUTE HYPOXIC RESPIRATORY FAILURE: ICD-10-CM

## 2023-05-08 DIAGNOSIS — I21.4 NSTEMI (NON-ST ELEVATED MYOCARDIAL INFARCTION): Primary | ICD-10-CM

## 2023-05-08 DIAGNOSIS — R79.89 ELEVATED TROPONIN: ICD-10-CM

## 2023-05-08 DIAGNOSIS — R65.20 SEPSIS WITH ACUTE HYPOXIC RESPIRATORY FAILURE: ICD-10-CM

## 2023-05-08 DIAGNOSIS — R06.02 SOB (SHORTNESS OF BREATH): ICD-10-CM

## 2023-05-08 DIAGNOSIS — J96.01 SEPSIS WITH ACUTE HYPOXIC RESPIRATORY FAILURE: ICD-10-CM

## 2023-05-08 LAB
ALBUMIN SERPL BCP-MCNC: 3.5 G/DL (ref 3.5–5.2)
ALLENS TEST: ABNORMAL
ALP SERPL-CCNC: 121 U/L (ref 55–135)
ALT SERPL W/O P-5'-P-CCNC: 41 U/L (ref 10–44)
ANION GAP SERPL CALC-SCNC: 20 MMOL/L (ref 8–16)
AST SERPL-CCNC: 86 U/L (ref 10–40)
BASOPHILS # BLD AUTO: 0.19 K/UL (ref 0–0.2)
BASOPHILS NFR BLD: 0.7 % (ref 0–1.9)
BILIRUB SERPL-MCNC: 0.2 MG/DL (ref 0.1–1)
BNP SERPL-MCNC: 1173 PG/ML (ref 0–99)
BUN SERPL-MCNC: 18 MG/DL (ref 8–23)
CALCIUM SERPL-MCNC: 9.3 MG/DL (ref 8.7–10.5)
CHLORIDE SERPL-SCNC: 104 MMOL/L (ref 95–110)
CO2 SERPL-SCNC: 15 MMOL/L (ref 23–29)
CREAT SERPL-MCNC: 1.8 MG/DL (ref 0.5–1.4)
DELSYS: ABNORMAL
DIFFERENTIAL METHOD: ABNORMAL
EOSINOPHIL # BLD AUTO: 0.2 K/UL (ref 0–0.5)
EOSINOPHIL NFR BLD: 0.7 % (ref 0–8)
EP: 8
ERYTHROCYTE [DISTWIDTH] IN BLOOD BY AUTOMATED COUNT: 13.1 % (ref 11.5–14.5)
EST. GFR  (NO RACE VARIABLE): 31 ML/MIN/1.73 M^2
FIO2: 40
GLUCOSE SERPL-MCNC: 364 MG/DL (ref 70–110)
HCO3 UR-SCNC: 15.4 MMOL/L (ref 24–28)
HCT VFR BLD AUTO: 54.6 % (ref 37–48.5)
HGB BLD-MCNC: 16.3 G/DL (ref 12–16)
IMM GRANULOCYTES # BLD AUTO: 0.42 K/UL (ref 0–0.04)
IMM GRANULOCYTES NFR BLD AUTO: 1.6 % (ref 0–0.5)
IP: 14
LACTATE SERPL-SCNC: 7 MMOL/L (ref 0.5–2.2)
LYMPHOCYTES # BLD AUTO: 4.5 K/UL (ref 1–4.8)
LYMPHOCYTES NFR BLD: 17.5 % (ref 18–48)
MCH RBC QN AUTO: 28.2 PG (ref 27–31)
MCHC RBC AUTO-ENTMCNC: 29.9 G/DL (ref 32–36)
MCV RBC AUTO: 95 FL (ref 82–98)
MODE: ABNORMAL
MONOCYTES # BLD AUTO: 1.3 K/UL (ref 0.3–1)
MONOCYTES NFR BLD: 5.2 % (ref 4–15)
NEUTROPHILS # BLD AUTO: 19.1 K/UL (ref 1.8–7.7)
NEUTROPHILS NFR BLD: 74.3 % (ref 38–73)
NRBC BLD-RTO: 0 /100 WBC
PCO2 BLDA: 46.7 MMHG (ref 35–45)
PH SMN: 7.13 [PH] (ref 7.35–7.45)
PLATELET # BLD AUTO: 384 K/UL (ref 150–450)
PMV BLD AUTO: 9.8 FL (ref 9.2–12.9)
PO2 BLDA: 112 MMHG (ref 80–100)
POC BE: -14 MMOL/L
POC SATURATED O2: 96 % (ref 95–100)
POTASSIUM SERPL-SCNC: 4.3 MMOL/L (ref 3.5–5.1)
PROT SERPL-MCNC: 7.2 G/DL (ref 6–8.4)
RBC # BLD AUTO: 5.77 M/UL (ref 4–5.4)
SAMPLE: ABNORMAL
SARS-COV-2 RDRP RESP QL NAA+PROBE: NEGATIVE
SITE: ABNORMAL
SODIUM SERPL-SCNC: 139 MMOL/L (ref 136–145)
TROPONIN I SERPL DL<=0.01 NG/ML-MCNC: 2.48 NG/ML (ref 0–0.03)
WBC # BLD AUTO: 25.68 K/UL (ref 3.9–12.7)

## 2023-05-08 PROCEDURE — 94761 N-INVAS EAR/PLS OXIMETRY MLT: CPT

## 2023-05-08 PROCEDURE — 25000242 PHARM REV CODE 250 ALT 637 W/ HCPCS: Performed by: EMERGENCY MEDICINE

## 2023-05-08 PROCEDURE — U0002 COVID-19 LAB TEST NON-CDC: HCPCS | Performed by: EMERGENCY MEDICINE

## 2023-05-08 PROCEDURE — 25000003 PHARM REV CODE 250: Performed by: EMERGENCY MEDICINE

## 2023-05-08 PROCEDURE — 99900035 HC TECH TIME PER 15 MIN (STAT)

## 2023-05-08 PROCEDURE — 27000221 HC OXYGEN, UP TO 24 HOURS

## 2023-05-08 PROCEDURE — 80053 COMPREHEN METABOLIC PANEL: CPT | Performed by: EMERGENCY MEDICINE

## 2023-05-08 PROCEDURE — 99291 CRITICAL CARE FIRST HOUR: CPT | Mod: 25

## 2023-05-08 PROCEDURE — 94640 AIRWAY INHALATION TREATMENT: CPT

## 2023-05-08 PROCEDURE — 93005 ELECTROCARDIOGRAM TRACING: CPT

## 2023-05-08 PROCEDURE — 11000001 HC ACUTE MED/SURG PRIVATE ROOM

## 2023-05-08 PROCEDURE — 96375 TX/PRO/DX INJ NEW DRUG ADDON: CPT

## 2023-05-08 PROCEDURE — 83880 ASSAY OF NATRIURETIC PEPTIDE: CPT | Performed by: EMERGENCY MEDICINE

## 2023-05-08 PROCEDURE — 82803 BLOOD GASES ANY COMBINATION: CPT

## 2023-05-08 PROCEDURE — 63600175 PHARM REV CODE 636 W HCPCS: Performed by: EMERGENCY MEDICINE

## 2023-05-08 PROCEDURE — 84484 ASSAY OF TROPONIN QUANT: CPT | Performed by: EMERGENCY MEDICINE

## 2023-05-08 PROCEDURE — 27000190 HC CPAP FULL FACE MASK W/VALVE

## 2023-05-08 PROCEDURE — 83605 ASSAY OF LACTIC ACID: CPT | Performed by: EMERGENCY MEDICINE

## 2023-05-08 PROCEDURE — 96365 THER/PROPH/DIAG IV INF INIT: CPT

## 2023-05-08 PROCEDURE — 93010 ELECTROCARDIOGRAM REPORT: CPT | Mod: ,,, | Performed by: INTERNAL MEDICINE

## 2023-05-08 PROCEDURE — 94660 CPAP INITIATION&MGMT: CPT

## 2023-05-08 PROCEDURE — 87040 BLOOD CULTURE FOR BACTERIA: CPT | Performed by: EMERGENCY MEDICINE

## 2023-05-08 PROCEDURE — 93010 EKG 12-LEAD: ICD-10-PCS | Mod: ,,, | Performed by: INTERNAL MEDICINE

## 2023-05-08 PROCEDURE — 36600 WITHDRAWAL OF ARTERIAL BLOOD: CPT

## 2023-05-08 PROCEDURE — 85025 COMPLETE CBC W/AUTO DIFF WBC: CPT | Performed by: EMERGENCY MEDICINE

## 2023-05-08 RX ORDER — ONDANSETRON 2 MG/ML
4 INJECTION INTRAMUSCULAR; INTRAVENOUS
Status: COMPLETED | OUTPATIENT
Start: 2023-05-08 | End: 2023-05-08

## 2023-05-08 RX ORDER — HEPARIN SODIUM,PORCINE/D5W 25000/250
0-40 INTRAVENOUS SOLUTION INTRAVENOUS CONTINUOUS
Status: DISCONTINUED | OUTPATIENT
Start: 2023-05-09 | End: 2023-05-11

## 2023-05-08 RX ORDER — GLYCOPYRROLATE 25 UG/ML
1 SOLUTION RESPIRATORY (INHALATION)
COMMUNITY

## 2023-05-08 RX ORDER — ACETAMINOPHEN 650 MG/1
650 SUPPOSITORY RECTAL
Status: COMPLETED | OUTPATIENT
Start: 2023-05-08 | End: 2023-05-08

## 2023-05-08 RX ORDER — ALPRAZOLAM 0.25 MG/1
TABLET ORAL
Status: ON HOLD | COMMUNITY
End: 2023-05-11 | Stop reason: SDUPTHER

## 2023-05-08 RX ORDER — HYDROXYZINE PAMOATE 25 MG/1
CAPSULE ORAL
COMMUNITY

## 2023-05-08 RX ORDER — CLOPIDOGREL BISULFATE 75 MG/1
TABLET ORAL
COMMUNITY

## 2023-05-08 RX ORDER — GABAPENTIN 100 MG/1
CAPSULE ORAL
COMMUNITY

## 2023-05-08 RX ORDER — FOLIC ACID 1 MG/1
TABLET ORAL
COMMUNITY

## 2023-05-08 RX ORDER — ALBUTEROL SULFATE 0.83 MG/ML
3 SOLUTION RESPIRATORY (INHALATION)
COMMUNITY

## 2023-05-08 RX ORDER — TRAZODONE HYDROCHLORIDE 100 MG/1
TABLET ORAL
COMMUNITY

## 2023-05-08 RX ORDER — DULOXETIN HYDROCHLORIDE 60 MG/1
CAPSULE, DELAYED RELEASE ORAL
COMMUNITY

## 2023-05-08 RX ORDER — ALBUTEROL SULFATE 90 UG/1
AEROSOL, METERED RESPIRATORY (INHALATION)
COMMUNITY

## 2023-05-08 RX ORDER — PANTOPRAZOLE SODIUM 40 MG/1
TABLET, DELAYED RELEASE ORAL
COMMUNITY

## 2023-05-08 RX ORDER — PRAVASTATIN SODIUM 10 MG/1
TABLET ORAL
COMMUNITY

## 2023-05-08 RX ORDER — LISINOPRIL 10 MG/1
TABLET ORAL
Status: ON HOLD | COMMUNITY
End: 2023-05-11 | Stop reason: HOSPADM

## 2023-05-08 RX ORDER — GUAIFENESIN 600 MG/1
TABLET, EXTENDED RELEASE ORAL
COMMUNITY

## 2023-05-08 RX ORDER — BUTALBITAL AND ACETAMINOPHEN 325; 50 MG/1; MG/1
TABLET ORAL
COMMUNITY

## 2023-05-08 RX ORDER — IPRATROPIUM BROMIDE AND ALBUTEROL SULFATE 2.5; .5 MG/3ML; MG/3ML
3 SOLUTION RESPIRATORY (INHALATION)
Status: COMPLETED | OUTPATIENT
Start: 2023-05-08 | End: 2023-05-08

## 2023-05-08 RX ADMIN — CEFTRIAXONE 1 G: 1 INJECTION, POWDER, FOR SOLUTION INTRAMUSCULAR; INTRAVENOUS at 10:05

## 2023-05-08 RX ADMIN — ACETAMINOPHEN 650 MG: 650 SUPPOSITORY RECTAL at 11:05

## 2023-05-08 RX ADMIN — IPRATROPIUM BROMIDE AND ALBUTEROL SULFATE 3 ML: .5; 3 SOLUTION RESPIRATORY (INHALATION) at 11:05

## 2023-05-08 RX ADMIN — ONDANSETRON 4 MG: 2 INJECTION INTRAMUSCULAR; INTRAVENOUS at 09:05

## 2023-05-08 RX ADMIN — AZITHROMYCIN MONOHYDRATE 500 MG: 500 INJECTION, POWDER, LYOPHILIZED, FOR SOLUTION INTRAVENOUS at 11:05

## 2023-05-08 RX ADMIN — SODIUM CHLORIDE 500 ML: 0.9 INJECTION, SOLUTION INTRAVENOUS at 10:05

## 2023-05-09 PROBLEM — F11.20 NARCOTIC DEPENDENCE: Status: ACTIVE | Noted: 2023-05-09

## 2023-05-09 PROBLEM — J44.1 COPD EXACERBATION: Status: ACTIVE | Noted: 2023-05-09

## 2023-05-09 PROBLEM — E87.20 METABOLIC ACIDOSIS: Status: ACTIVE | Noted: 2023-05-09

## 2023-05-09 PROBLEM — N30.01 ACUTE CYSTITIS WITH HEMATURIA: Status: ACTIVE | Noted: 2023-05-09

## 2023-05-09 PROBLEM — A41.9 SEPSIS WITH ACUTE HYPOXIC RESPIRATORY FAILURE: Status: ACTIVE | Noted: 2023-05-09

## 2023-05-09 PROBLEM — J96.01 SEPSIS WITH ACUTE HYPOXIC RESPIRATORY FAILURE: Status: ACTIVE | Noted: 2023-05-09

## 2023-05-09 PROBLEM — R65.20 SEPSIS WITH ACUTE HYPOXIC RESPIRATORY FAILURE: Status: ACTIVE | Noted: 2023-05-09

## 2023-05-09 PROBLEM — J18.9 PNEUMONIA OF BOTH LUNGS DUE TO INFECTIOUS ORGANISM: Status: ACTIVE | Noted: 2023-05-09

## 2023-05-09 PROBLEM — N17.9 AKI (ACUTE KIDNEY INJURY): Status: ACTIVE | Noted: 2023-05-09

## 2023-05-09 PROBLEM — I21.4 NSTEMI (NON-ST ELEVATED MYOCARDIAL INFARCTION): Status: ACTIVE | Noted: 2023-05-09

## 2023-05-09 PROBLEM — Z98.890 HISTORY OF CAROTID ENDARTERECTOMY: Status: ACTIVE | Noted: 2023-05-09

## 2023-05-09 PROBLEM — R73.9 HYPERGLYCEMIA: Status: ACTIVE | Noted: 2023-05-09

## 2023-05-09 PROBLEM — E78.5 DYSLIPIDEMIA: Status: ACTIVE | Noted: 2023-05-09

## 2023-05-09 LAB
ALLENS TEST: ABNORMAL
ALLENS TEST: ABNORMAL
AMPHET+METHAMPHET UR QL: NEGATIVE
ANION GAP SERPL CALC-SCNC: 18 MMOL/L (ref 8–16)
AORTIC ROOT ANNULUS: 2.81 CM
APTT PPP: 26.3 SEC (ref 21–32)
APTT PPP: 32.1 SEC (ref 21–32)
APTT PPP: 33.6 SEC (ref 21–32)
APTT PPP: 43.6 SEC (ref 21–32)
APTT PPP: 85.3 SEC (ref 21–32)
ASCENDING AORTA: 2.82 CM
AV INDEX (PROSTH): 0.85
AV MEAN GRADIENT: 4 MMHG
AV PEAK GRADIENT: 6 MMHG
AV VALVE AREA: 2.55 CM2
AV VELOCITY RATIO: 0.76
B-OH-BUTYR BLD STRIP-SCNC: 0.1 MMOL/L (ref 0–0.5)
BACTERIA #/AREA URNS HPF: ABNORMAL /HPF
BARBITURATES UR QL SCN>200 NG/ML: NEGATIVE
BASOPHILS # BLD AUTO: 0.06 K/UL (ref 0–0.2)
BASOPHILS NFR BLD: 0.3 % (ref 0–1.9)
BENZODIAZ UR QL SCN>200 NG/ML: NEGATIVE
BILIRUB UR QL STRIP: NEGATIVE
BSA FOR ECHO PROCEDURE: 1.45 M2
BUN SERPL-MCNC: 22 MG/DL (ref 8–23)
BZE UR QL SCN: NEGATIVE
CALCIUM SERPL-MCNC: 8.1 MG/DL (ref 8.7–10.5)
CANNABINOIDS UR QL SCN: ABNORMAL
CHLORIDE SERPL-SCNC: 107 MMOL/L (ref 95–110)
CHOLEST SERPL-MCNC: 147 MG/DL (ref 120–199)
CHOLEST/HDLC SERPL: 4.6 {RATIO} (ref 2–5)
CLARITY UR: ABNORMAL
CO2 SERPL-SCNC: 18 MMOL/L (ref 23–29)
COLOR UR: YELLOW
CREAT SERPL-MCNC: 1.3 MG/DL (ref 0.5–1.4)
CREAT UR-MCNC: 76.4 MG/DL (ref 15–325)
CV ECHO LV RWT: 0.53 CM
DELSYS: ABNORMAL
DELSYS: ABNORMAL
DIFFERENTIAL METHOD: ABNORMAL
DOP CALC AO PEAK VEL: 1.22 M/S
DOP CALC AO VTI: 19.7 CM
DOP CALC LVOT AREA: 3 CM2
DOP CALC LVOT DIAMETER: 1.95 CM
DOP CALC LVOT PEAK VEL: 0.93 M/S
DOP CALC LVOT STROKE VOLUME: 50.15 CM3
DOP CALC RVOT PEAK VEL: 0.75 M/S
DOP CALC RVOT VTI: 10.9 CM
DOP CALCLVOT PEAK VEL VTI: 16.8 CM
E WAVE DECELERATION TIME: 152.1 MSEC
E/A RATIO: 0.93
E/E' RATIO: 8.22 M/S
ECHO LV POSTERIOR WALL: 1.03 CM (ref 0.6–1.1)
EJECTION FRACTION: 30 %
EOSINOPHIL # BLD AUTO: 0 K/UL (ref 0–0.5)
EOSINOPHIL NFR BLD: 0 % (ref 0–8)
EP: 8
EP: 8
ERYTHROCYTE [DISTWIDTH] IN BLOOD BY AUTOMATED COUNT: 13 % (ref 11.5–14.5)
ERYTHROCYTE [SEDIMENTATION RATE] IN BLOOD BY WESTERGREN METHOD: 20 MM/H
ERYTHROCYTE [SEDIMENTATION RATE] IN BLOOD BY WESTERGREN METHOD: 20 MM/H
EST. GFR  (NO RACE VARIABLE): 45 ML/MIN/1.73 M^2
ESTIMATED AVG GLUCOSE: 108 MG/DL (ref 68–131)
FIO2: 40
FIO2: 40
FRACTIONAL SHORTENING: 10 % (ref 28–44)
GLUCOSE SERPL-MCNC: 204 MG/DL (ref 70–110)
GLUCOSE UR QL STRIP: ABNORMAL
HBA1C MFR BLD: 5.4 % (ref 4–5.6)
HCO3 UR-SCNC: 15.8 MMOL/L (ref 24–28)
HCO3 UR-SCNC: 22.9 MMOL/L (ref 24–28)
HCT VFR BLD AUTO: 44 % (ref 37–48.5)
HDLC SERPL-MCNC: 32 MG/DL (ref 40–75)
HDLC SERPL: 21.8 % (ref 20–50)
HGB BLD-MCNC: 13.8 G/DL (ref 12–16)
HGB UR QL STRIP: ABNORMAL
HYALINE CASTS #/AREA URNS LPF: 4 /LPF
IMM GRANULOCYTES # BLD AUTO: 0.19 K/UL (ref 0–0.04)
IMM GRANULOCYTES NFR BLD AUTO: 1 % (ref 0–0.5)
INFLUENZA A, MOLECULAR: NEGATIVE
INFLUENZA B, MOLECULAR: NEGATIVE
INR PPP: 1.4 (ref 0.8–1.2)
INTERVENTRICULAR SEPTUM: 1.12 CM (ref 0.6–1.1)
IP: 16
IP: 16
IRON SERPL-MCNC: 36 UG/DL (ref 30–160)
IVC DIAMETER: 1.53 CM
IVRT: 68.51 MSEC
KETONES UR QL STRIP: NEGATIVE
LA MAJOR: 4.03 CM
LA MINOR: 4.09 CM
LA WIDTH: 3.1 CM
LACTATE SERPL-SCNC: 2.7 MMOL/L (ref 0.5–2.2)
LACTATE SERPL-SCNC: 6 MMOL/L (ref 0.5–2.2)
LDLC SERPL CALC-MCNC: 95 MG/DL (ref 63–159)
LEFT ATRIUM SIZE: 3.08 CM
LEFT ATRIUM VOLUME INDEX: 22.7 ML/M2
LEFT ATRIUM VOLUME: 32.95 CM3
LEFT INTERNAL DIMENSION IN SYSTOLE: 3.53 CM (ref 2.1–4)
LEFT VENTRICLE DIASTOLIC VOLUME INDEX: 45.99 ML/M2
LEFT VENTRICLE DIASTOLIC VOLUME: 66.68 ML
LEFT VENTRICLE MASS INDEX: 94 G/M2
LEFT VENTRICLE SYSTOLIC VOLUME INDEX: 35.7 ML/M2
LEFT VENTRICLE SYSTOLIC VOLUME: 51.76 ML
LEFT VENTRICULAR INTERNAL DIMENSION IN DIASTOLE: 3.92 CM (ref 3.5–6)
LEFT VENTRICULAR MASS: 136.56 G
LEUKOCYTE ESTERASE UR QL STRIP: ABNORMAL
LV LATERAL E/E' RATIO: 9.25 M/S
LV SEPTAL E/E' RATIO: 7.4 M/S
LVOT MG: 2.34 MMHG
LVOT MV: 0.75 CM/S
LYMPHOCYTES # BLD AUTO: 0.8 K/UL (ref 1–4.8)
LYMPHOCYTES NFR BLD: 3.9 % (ref 18–48)
MAGNESIUM SERPL-MCNC: 2 MG/DL (ref 1.6–2.6)
MCH RBC QN AUTO: 27.9 PG (ref 27–31)
MCHC RBC AUTO-ENTMCNC: 31.4 G/DL (ref 32–36)
MCV RBC AUTO: 89 FL (ref 82–98)
METHADONE UR QL SCN>300 NG/ML: NEGATIVE
MICROSCOPIC COMMENT: ABNORMAL
MODE: ABNORMAL
MODE: ABNORMAL
MONOCYTES # BLD AUTO: 0.6 K/UL (ref 0.3–1)
MONOCYTES NFR BLD: 3.1 % (ref 4–15)
MV PEAK A VEL: 0.8 M/S
MV PEAK E VEL: 0.74 M/S
MV STENOSIS PRESSURE HALF TIME: 44.11 MS
MV VALVE AREA P 1/2 METHOD: 4.99 CM2
NEUTROPHILS # BLD AUTO: 18.3 K/UL (ref 1.8–7.7)
NEUTROPHILS NFR BLD: 91.7 % (ref 38–73)
NITRITE UR QL STRIP: NEGATIVE
NONHDLC SERPL-MCNC: 115 MG/DL
NRBC BLD-RTO: 0 /100 WBC
OPIATES UR QL SCN: NEGATIVE
PCO2 BLDA: 38.6 MMHG (ref 35–45)
PCO2 BLDA: 40.5 MMHG (ref 35–45)
PCP UR QL SCN>25 NG/ML: NEGATIVE
PH SMN: 7.2 [PH] (ref 7.35–7.45)
PH SMN: 7.38 [PH] (ref 7.35–7.45)
PH UR STRIP: 7 [PH] (ref 5–8)
PISA MRMAX VEL: 2.92 M/S
PISA TR MAX VEL: 2.63 M/S
PLATELET # BLD AUTO: 264 K/UL (ref 150–450)
PMV BLD AUTO: 10 FL (ref 9.2–12.9)
PO2 BLDA: 108 MMHG (ref 80–100)
PO2 BLDA: 89 MMHG (ref 80–100)
POC BE: -12 MMOL/L
POC BE: -2 MMOL/L
POC SATURATED O2: 97 % (ref 95–100)
POC SATURATED O2: 97 % (ref 95–100)
POCT GLUCOSE: 113 MG/DL (ref 70–110)
POCT GLUCOSE: 125 MG/DL (ref 70–110)
POCT GLUCOSE: 131 MG/DL (ref 70–110)
POCT GLUCOSE: 139 MG/DL (ref 70–110)
POCT GLUCOSE: 150 MG/DL (ref 70–110)
POCT GLUCOSE: 177 MG/DL (ref 70–110)
POTASSIUM SERPL-SCNC: 3.2 MMOL/L (ref 3.5–5.1)
PROT UR QL STRIP: ABNORMAL
PROTHROMBIN TIME: 14.4 SEC (ref 9–12.5)
PV MEAN GRADIENT: 1.61 MMHG
RA MAJOR: 3.18 CM
RA WIDTH: 2.1 CM
RBC # BLD AUTO: 4.94 M/UL (ref 4–5.4)
RBC #/AREA URNS HPF: 7 /HPF (ref 0–4)
SAMPLE: ABNORMAL
SAMPLE: ABNORMAL
SITE: ABNORMAL
SITE: ABNORMAL
SODIUM SERPL-SCNC: 143 MMOL/L (ref 136–145)
SP GR UR STRIP: 1.02 (ref 1–1.03)
SPECIMEN SOURCE: NORMAL
STJ: 3.14 CM
T4 FREE SERPL-MCNC: 1.04 NG/DL (ref 0.71–1.51)
TDI LATERAL: 0.08 M/S
TDI SEPTAL: 0.1 M/S
TDI: 0.09 M/S
TOXICOLOGY INFORMATION: ABNORMAL
TR MAX PG: 28 MMHG
TR MEAN GRADIENT: 22 MMHG
TRICUSPID ANNULAR PLANE SYSTOLIC EXCURSION: 1.4 CM
TRIGL SERPL-MCNC: 100 MG/DL (ref 30–150)
TROPONIN I SERPL DL<=0.01 NG/ML-MCNC: 3.12 NG/ML (ref 0–0.03)
TROPONIN I SERPL DL<=0.01 NG/ML-MCNC: 3.41 NG/ML (ref 0–0.03)
TROPONIN I SERPL DL<=0.01 NG/ML-MCNC: 3.53 NG/ML (ref 0–0.03)
TROPONIN I SERPL DL<=0.01 NG/ML-MCNC: 4 NG/ML (ref 0–0.03)
TROPONIN I SERPL DL<=0.01 NG/ML-MCNC: 4.05 NG/ML (ref 0–0.03)
TSH SERPL DL<=0.005 MIU/L-ACNC: 1.22 UIU/ML (ref 0.4–4)
URN SPEC COLLECT METH UR: ABNORMAL
UROBILINOGEN UR STRIP-ACNC: NEGATIVE EU/DL
VANCOMYCIN SERPL-MCNC: 6.7 UG/ML
WBC # BLD AUTO: 19.98 K/UL (ref 3.9–12.7)
WBC #/AREA URNS HPF: 24 /HPF (ref 0–5)
WBC CLUMPS URNS QL MICRO: ABNORMAL

## 2023-05-09 PROCEDURE — 82803 BLOOD GASES ANY COMBINATION: CPT

## 2023-05-09 PROCEDURE — 99900035 HC TECH TIME PER 15 MIN (STAT)

## 2023-05-09 PROCEDURE — 94640 AIRWAY INHALATION TREATMENT: CPT

## 2023-05-09 PROCEDURE — 94760 N-INVAS EAR/PLS OXIMETRY 1: CPT

## 2023-05-09 PROCEDURE — 85730 THROMBOPLASTIN TIME PARTIAL: CPT | Mod: 91 | Performed by: INTERNAL MEDICINE

## 2023-05-09 PROCEDURE — 25000003 PHARM REV CODE 250: Performed by: FAMILY MEDICINE

## 2023-05-09 PROCEDURE — 99223 PR INITIAL HOSPITAL CARE,LEVL III: ICD-10-PCS | Mod: ,,, | Performed by: INTERNAL MEDICINE

## 2023-05-09 PROCEDURE — 83735 ASSAY OF MAGNESIUM: CPT | Performed by: NURSE PRACTITIONER

## 2023-05-09 PROCEDURE — 63600175 PHARM REV CODE 636 W HCPCS: Performed by: NURSE PRACTITIONER

## 2023-05-09 PROCEDURE — 84439 ASSAY OF FREE THYROXINE: CPT | Performed by: NURSE PRACTITIONER

## 2023-05-09 PROCEDURE — 87502 INFLUENZA DNA AMP PROBE: CPT | Performed by: INTERNAL MEDICINE

## 2023-05-09 PROCEDURE — 82010 KETONE BODYS QUAN: CPT | Performed by: EMERGENCY MEDICINE

## 2023-05-09 PROCEDURE — 97162 PT EVAL MOD COMPLEX 30 MIN: CPT

## 2023-05-09 PROCEDURE — 83605 ASSAY OF LACTIC ACID: CPT | Performed by: INTERNAL MEDICINE

## 2023-05-09 PROCEDURE — 87449 NOS EACH ORGANISM AG IA: CPT | Performed by: NURSE PRACTITIONER

## 2023-05-09 PROCEDURE — 85730 THROMBOPLASTIN TIME PARTIAL: CPT | Mod: 91 | Performed by: EMERGENCY MEDICINE

## 2023-05-09 PROCEDURE — 25000003 PHARM REV CODE 250: Performed by: EMERGENCY MEDICINE

## 2023-05-09 PROCEDURE — 25000242 PHARM REV CODE 250 ALT 637 W/ HCPCS: Performed by: NURSE PRACTITIONER

## 2023-05-09 PROCEDURE — 80048 BASIC METABOLIC PNL TOTAL CA: CPT | Performed by: NURSE PRACTITIONER

## 2023-05-09 PROCEDURE — 37799 UNLISTED PX VASCULAR SURGERY: CPT

## 2023-05-09 PROCEDURE — 97530 THERAPEUTIC ACTIVITIES: CPT

## 2023-05-09 PROCEDURE — 83540 ASSAY OF IRON: CPT | Performed by: NURSE PRACTITIONER

## 2023-05-09 PROCEDURE — 25000003 PHARM REV CODE 250: Performed by: NURSE PRACTITIONER

## 2023-05-09 PROCEDURE — 80202 ASSAY OF VANCOMYCIN: CPT | Performed by: EMERGENCY MEDICINE

## 2023-05-09 PROCEDURE — 99223 1ST HOSP IP/OBS HIGH 75: CPT | Mod: ,,, | Performed by: INTERNAL MEDICINE

## 2023-05-09 PROCEDURE — 84484 ASSAY OF TROPONIN QUANT: CPT | Mod: 91 | Performed by: INTERNAL MEDICINE

## 2023-05-09 PROCEDURE — 21400001 HC TELEMETRY ROOM

## 2023-05-09 PROCEDURE — 27000221 HC OXYGEN, UP TO 24 HOURS

## 2023-05-09 PROCEDURE — 63600175 PHARM REV CODE 636 W HCPCS: Performed by: FAMILY MEDICINE

## 2023-05-09 PROCEDURE — 80061 LIPID PANEL: CPT | Performed by: NURSE PRACTITIONER

## 2023-05-09 PROCEDURE — 25000003 PHARM REV CODE 250: Performed by: INTERNAL MEDICINE

## 2023-05-09 PROCEDURE — 97166 OT EVAL MOD COMPLEX 45 MIN: CPT

## 2023-05-09 PROCEDURE — 84443 ASSAY THYROID STIM HORMONE: CPT | Performed by: NURSE PRACTITIONER

## 2023-05-09 PROCEDURE — 80307 DRUG TEST PRSMV CHEM ANLYZR: CPT | Performed by: NURSE PRACTITIONER

## 2023-05-09 PROCEDURE — 87040 BLOOD CULTURE FOR BACTERIA: CPT | Performed by: EMERGENCY MEDICINE

## 2023-05-09 PROCEDURE — 27000207 HC ISOLATION

## 2023-05-09 PROCEDURE — 83036 HEMOGLOBIN GLYCOSYLATED A1C: CPT | Performed by: NURSE PRACTITIONER

## 2023-05-09 PROCEDURE — A4217 STERILE WATER/SALINE, 500 ML: HCPCS | Performed by: NURSE PRACTITIONER

## 2023-05-09 PROCEDURE — 85025 COMPLETE CBC W/AUTO DIFF WBC: CPT | Performed by: EMERGENCY MEDICINE

## 2023-05-09 PROCEDURE — 85610 PROTHROMBIN TIME: CPT | Performed by: EMERGENCY MEDICINE

## 2023-05-09 PROCEDURE — 36415 COLL VENOUS BLD VENIPUNCTURE: CPT | Performed by: INTERNAL MEDICINE

## 2023-05-09 PROCEDURE — 63600175 PHARM REV CODE 636 W HCPCS: Performed by: EMERGENCY MEDICINE

## 2023-05-09 PROCEDURE — 87086 URINE CULTURE/COLONY COUNT: CPT | Performed by: NURSE PRACTITIONER

## 2023-05-09 PROCEDURE — 36620 INSERTION CATHETER ARTERY: CPT

## 2023-05-09 PROCEDURE — 84484 ASSAY OF TROPONIN QUANT: CPT | Mod: 91 | Performed by: NURSE PRACTITIONER

## 2023-05-09 PROCEDURE — 81000 URINALYSIS NONAUTO W/SCOPE: CPT | Mod: 59 | Performed by: NURSE PRACTITIONER

## 2023-05-09 PROCEDURE — 63600175 PHARM REV CODE 636 W HCPCS: Performed by: INTERNAL MEDICINE

## 2023-05-09 RX ORDER — INDOMETHACIN 25 MG/1
100 CAPSULE ORAL ONCE
Status: COMPLETED | OUTPATIENT
Start: 2023-05-09 | End: 2023-05-09

## 2023-05-09 RX ORDER — LEVALBUTEROL INHALATION SOLUTION 0.63 MG/3ML
1.25 SOLUTION RESPIRATORY (INHALATION) EVERY 8 HOURS
Status: DISCONTINUED | OUTPATIENT
Start: 2023-05-09 | End: 2023-05-10

## 2023-05-09 RX ORDER — GUAIFENESIN 600 MG/1
600 TABLET, EXTENDED RELEASE ORAL 2 TIMES DAILY
Status: DISCONTINUED | OUTPATIENT
Start: 2023-05-09 | End: 2023-05-11 | Stop reason: HOSPADM

## 2023-05-09 RX ORDER — LEVALBUTEROL 1.25 MG/.5ML
1.25 SOLUTION, CONCENTRATE RESPIRATORY (INHALATION) EVERY 8 HOURS
Status: DISCONTINUED | OUTPATIENT
Start: 2023-05-09 | End: 2023-05-09

## 2023-05-09 RX ORDER — ACETAMINOPHEN 325 MG/1
650 TABLET ORAL EVERY 6 HOURS PRN
Status: DISCONTINUED | OUTPATIENT
Start: 2023-05-09 | End: 2023-05-11 | Stop reason: HOSPADM

## 2023-05-09 RX ORDER — PANTOPRAZOLE SODIUM 40 MG/1
40 TABLET, DELAYED RELEASE ORAL DAILY
Status: DISCONTINUED | OUTPATIENT
Start: 2023-05-09 | End: 2023-05-11 | Stop reason: HOSPADM

## 2023-05-09 RX ORDER — SODIUM CHLORIDE 0.9 % (FLUSH) 0.9 %
10 SYRINGE (ML) INJECTION
Status: DISCONTINUED | OUTPATIENT
Start: 2023-05-09 | End: 2023-05-11 | Stop reason: HOSPADM

## 2023-05-09 RX ORDER — PRAVASTATIN SODIUM 20 MG/1
40 TABLET ORAL DAILY
Status: DISCONTINUED | OUTPATIENT
Start: 2023-05-09 | End: 2023-05-11 | Stop reason: HOSPADM

## 2023-05-09 RX ORDER — NAPROXEN SODIUM 220 MG/1
81 TABLET, FILM COATED ORAL DAILY
Status: DISCONTINUED | OUTPATIENT
Start: 2023-05-09 | End: 2023-05-11 | Stop reason: HOSPADM

## 2023-05-09 RX ORDER — TALC
6 POWDER (GRAM) TOPICAL NIGHTLY PRN
Status: DISCONTINUED | OUTPATIENT
Start: 2023-05-09 | End: 2023-05-11 | Stop reason: HOSPADM

## 2023-05-09 RX ORDER — SODIUM BICARBONATE 650 MG/1
650 TABLET ORAL 3 TIMES DAILY
Status: DISCONTINUED | OUTPATIENT
Start: 2023-05-09 | End: 2023-05-11 | Stop reason: HOSPADM

## 2023-05-09 RX ORDER — POTASSIUM CHLORIDE 20 MEQ/1
40 TABLET, EXTENDED RELEASE ORAL ONCE
Status: COMPLETED | OUTPATIENT
Start: 2023-05-09 | End: 2023-05-09

## 2023-05-09 RX ORDER — IPRATROPIUM BROMIDE AND ALBUTEROL SULFATE 2.5; .5 MG/3ML; MG/3ML
3 SOLUTION RESPIRATORY (INHALATION) EVERY 4 HOURS
Status: DISCONTINUED | OUTPATIENT
Start: 2023-05-09 | End: 2023-05-09

## 2023-05-09 RX ORDER — ONDANSETRON 2 MG/ML
4 INJECTION INTRAMUSCULAR; INTRAVENOUS EVERY 8 HOURS PRN
Status: DISCONTINUED | OUTPATIENT
Start: 2023-05-09 | End: 2023-05-11 | Stop reason: HOSPADM

## 2023-05-09 RX ORDER — GLUCAGON 1 MG
1 KIT INJECTION
Status: DISCONTINUED | OUTPATIENT
Start: 2023-05-09 | End: 2023-05-11 | Stop reason: HOSPADM

## 2023-05-09 RX ORDER — MUPIROCIN 20 MG/G
OINTMENT TOPICAL 2 TIMES DAILY
Status: DISCONTINUED | OUTPATIENT
Start: 2023-05-09 | End: 2023-05-11 | Stop reason: HOSPADM

## 2023-05-09 RX ORDER — INSULIN ASPART 100 [IU]/ML
1-10 INJECTION, SOLUTION INTRAVENOUS; SUBCUTANEOUS EVERY 4 HOURS PRN
Status: DISCONTINUED | OUTPATIENT
Start: 2023-05-09 | End: 2023-05-11 | Stop reason: HOSPADM

## 2023-05-09 RX ADMIN — METHYLPREDNISOLONE SODIUM SUCCINATE 80 MG: 40 INJECTION, POWDER, FOR SOLUTION INTRAMUSCULAR; INTRAVENOUS at 12:05

## 2023-05-09 RX ADMIN — METHYLPREDNISOLONE SODIUM SUCCINATE 80 MG: 40 INJECTION, POWDER, FOR SOLUTION INTRAMUSCULAR; INTRAVENOUS at 05:05

## 2023-05-09 RX ADMIN — PRAVASTATIN SODIUM 40 MG: 20 TABLET ORAL at 08:05

## 2023-05-09 RX ADMIN — VANCOMYCIN HYDROCHLORIDE 750 MG: 750 INJECTION, POWDER, LYOPHILIZED, FOR SOLUTION INTRAVENOUS at 08:05

## 2023-05-09 RX ADMIN — MUPIROCIN: 20 OINTMENT TOPICAL at 08:05

## 2023-05-09 RX ADMIN — SODIUM BICARBONATE 100 MEQ: 84 INJECTION, SOLUTION INTRAVENOUS at 01:05

## 2023-05-09 RX ADMIN — VANCOMYCIN HYDROCHLORIDE 750 MG: 750 INJECTION, POWDER, LYOPHILIZED, FOR SOLUTION INTRAVENOUS at 03:05

## 2023-05-09 RX ADMIN — SODIUM BICARBONATE 650 MG TABLET 650 MG: at 08:05

## 2023-05-09 RX ADMIN — GUAIFENESIN 600 MG: 600 TABLET, EXTENDED RELEASE ORAL at 08:05

## 2023-05-09 RX ADMIN — PANTOPRAZOLE SODIUM 40 MG: 40 TABLET, DELAYED RELEASE ORAL at 08:05

## 2023-05-09 RX ADMIN — ACETAMINOPHEN 650 MG: 325 TABLET ORAL at 03:05

## 2023-05-09 RX ADMIN — ONDANSETRON 4 MG: 2 INJECTION INTRAMUSCULAR; INTRAVENOUS at 03:05

## 2023-05-09 RX ADMIN — CEFEPIME 2 G: 2 INJECTION, POWDER, FOR SOLUTION INTRAVENOUS at 03:05

## 2023-05-09 RX ADMIN — SODIUM CHLORIDE 500 ML: 9 INJECTION, SOLUTION INTRAVENOUS at 12:05

## 2023-05-09 RX ADMIN — METHYLPREDNISOLONE SODIUM SUCCINATE 80 MG: 40 INJECTION, POWDER, FOR SOLUTION INTRAMUSCULAR; INTRAVENOUS at 11:05

## 2023-05-09 RX ADMIN — SODIUM CHLORIDE, POTASSIUM CHLORIDE, SODIUM LACTATE AND CALCIUM CHLORIDE 500 ML: 600; 310; 30; 20 INJECTION, SOLUTION INTRAVENOUS at 05:05

## 2023-05-09 RX ADMIN — POTASSIUM CHLORIDE 40 MEQ: 1500 TABLET, EXTENDED RELEASE ORAL at 05:05

## 2023-05-09 RX ADMIN — SODIUM BICARBONATE: 84 INJECTION, SOLUTION INTRAVENOUS at 04:05

## 2023-05-09 RX ADMIN — HEPARIN SODIUM 12 UNITS/KG/HR: 10000 INJECTION, SOLUTION INTRAVENOUS at 02:05

## 2023-05-09 RX ADMIN — ASPIRIN 81 MG: 81 TABLET, CHEWABLE ORAL at 08:05

## 2023-05-09 RX ADMIN — LEVALBUTEROL HYDROCHLORIDE 1.25 MG: 0.63 SOLUTION RESPIRATORY (INHALATION) at 11:05

## 2023-05-09 RX ADMIN — LEVALBUTEROL HYDROCHLORIDE 1.25 MG: 0.63 SOLUTION RESPIRATORY (INHALATION) at 04:05

## 2023-05-09 RX ADMIN — SODIUM BICARBONATE 650 MG TABLET 650 MG: at 05:05

## 2023-05-09 RX ADMIN — LEVALBUTEROL HYDROCHLORIDE 1.25 MG: 0.63 SOLUTION RESPIRATORY (INHALATION) at 08:05

## 2023-05-09 NOTE — NURSING
Pt arrived to ICU  on NC with ED RN. Vanc and heparin gtts infusing. Monitors applied, alarms active. Pt oriented to unit, call light within reach.

## 2023-05-09 NOTE — ASSESSMENT & PLAN NOTE
Initial troponin 2.476, trended upwards to 3.118  Cardiology consulted per ED, reviewed EKG, stated that patient did not need urgent heart catheterization  Heparin drip  Trending troponin levels  She denies chest pain at this time  Check lipid panel, statin dose increased

## 2023-05-09 NOTE — HPI
67F PMH hypertension, hyperlipidemia, cva, copd, congestive heart failure, chronic pain, narcotic dependence, gerd, anxiety and depression, admitted for copdx. Associated with nausea, vomiting, cough, and dyspnea. Denies chest pain. Received solumedrol and duonebs en route.    Patient febrile, tachycardic, and tachypneic. Initial workup revealed leukocytosis, acute kidney injury, hyperglycemia, elevated ast. Lactate elevated. Cardiac markers also elevated. Uds positive for marijuana. Urinalysis with rare bacteria. Chest x-ray with interstitial opacities. Ct head wo contrast with atrophy, chronic changes and old infarcts.    Blood culture negative growth to date.     Cardiology consulted and recommended medical management. Echocardiogram reviewed with 30% ejection fraction.  Us carotid bilateral with significant tissue lesion/plaque with prior [facility] carotid endarterectomy.    Patient initially required continuous nippv warranting icu admission. Patient weaned to supplemental oxygen with systemic steroids and intravenous antibiotic(s).    Hospital medicine consulted for transfer of care.     Patient reports improvement in symptoms. States inciting event was panic attack from stress. Denies dyspnea, chest pain. +Hunger. Wanting to go home. Primary pulmonologist is Dr. Beltre. She reports having ran out of inhaler medication(s).

## 2023-05-09 NOTE — ASSESSMENT & PLAN NOTE
Considerable wheezing on exam  Nebulizer treatments ordered  Currently requiring BiPAP  Continue IV steroids  Continue guaifenesin

## 2023-05-09 NOTE — PROGRESS NOTES
Pharmacist Renal Dose Adjustment Note    Margie Zamudio is a 67 y.o. female being treated with the medication cefepime.    Patient Data:    Vital Signs (Most Recent):  Temp: (!) 101.1 °F (38.4 °C) (MD notified) (05/08/23 2136)  Pulse: (!) 55 (05/09/23 0017)  Resp: (!) 26 (05/09/23 0017)  BP: 124/89 (05/09/23 0017)  SpO2: (!) 92 % (05/08/23 2332) Vital Signs (72h Range):  Temp:  [101.1 °F (38.4 °C)]   Pulse:  []   Resp:  [26-42]   BP: (122-146)/()   SpO2:  [92 %-100 %]      Recent Labs   Lab 05/08/23 2207   CREATININE 1.8*     Serum creatinine: 1.8 mg/dL (H) 05/08/23 2207  Estimated creatinine clearance: 20.7 mL/min (A)    Cefepime 2 g IV every 8 hours will be changed to cefepime 2 g IV every 24 hours for the treatment of severe infection with CrCl 11-29 mL/min.    Pharmacist's Name: Mandeep Farley  Pharmacist's Extension: [phone]

## 2023-05-09 NOTE — PLAN OF CARE
OT corazon completed. Recommends SNF vs HHOT depending on progress.  Mod A for bed mobility and t/fs with HHA. Decreased RUE AROM.

## 2023-05-09 NOTE — ASSESSMENT & PLAN NOTE
Broad-spectrum antibiotics  Follow-up blood cultures, follow up sputum culture  Monitor chest x-ray

## 2023-05-09 NOTE — PT/OT/SLP EVAL
Physical Therapy Evaluation    Patient Name:  Margie Zamudio   MRN:  [id number]    Recommendations:     Discharge Recommendations: nursing facility, skilled, home health PT (DEPENDING ON PROGRESS WITH POC)   Discharge Equipment Recommendations: bedside commode, walker, rolling   Barriers to discharge: Inaccessible home    Assessment:     Margie Zamudio is a 67 y.o. female admitted with a medical diagnosis of Sepsis with acute hypoxic respiratory failure.  She presents with the following impairments/functional limitations: weakness, impaired endurance, impaired functional mobility, gait instability, impaired balance, pain, decreased safety awareness, decreased lower extremity function, decreased upper extremity function, decreased coordination, impaired cardiopulmonary response to activity.    Rehab Prognosis: Good; patient would benefit from acute skilled PT services to address these deficits and reach maximum level of function.    Recent Surgery: * No surgery found *     Plan:     During this hospitalization, patient to be seen 3 x/week to address the identified rehab impairments via gait training, therapeutic activities, therapeutic exercises and progress toward the following goals:    Plan of Care Expires:  05/23/23    Subjective     Chief Complaint: WEAKNESS BUT EAGER TO GET OOB  Patient/Family Comments/goals:   Pain/Comfort:  Pain Rating 1: 5/10  Location - Side 1: Right  Location 1: hip  Pain Addressed 1: Reposition, Distraction  Pain Rating Post-Intervention 1: 5/10    Patients cultural, spiritual, Hinduism conflicts given the current situation:      Living Environment:  PT LIVES WITH SIGNIFICANT OTHER AND HIS SON, 1 STORY HOUSE WITH 15 STEPS TO ENTER B RAIL, PT AMB HOUSEHOLD DISTANCES WITH ROLLATOR WALKER, USES W/C FOR COMMUNITY DISTANCES, REQUIRES ASSIST FROM FAMILY FOR ASC/DESC STEPS, DOES NOT DRIVE, HOME O2 DEPENDENT, FAMILY ASSISTS WITH ADL'S  Prior to admission, patients level of function was.   Equipment used at home: oxygen, rollator, cane, straight, shower chair.  DME owned (not currently used): none.  Upon discharge, patient will have assistance from FAMILY.    Objective:     Communicated with NURSE SEARS prior to session.  Patient found supine with telemetry, pulse ox (continuous), peripheral IV, arterial line, blood pressure cuff, alvarez catheter, oxygen  upon PT entry to room.    General Precautions: Standard, contact, fall, respiratory (H/O CVA WITH R SIDE AFFECTED)  Orthopedic Precautions:N/A   Braces: N/A  Respiratory Status: Nasal cannula, flow 4 L/min    Exams:  Cognitive Exam:  Patient is oriented to Person, Place, Time, and Situation  Postural Exam:  Patient presented with the following abnormalities:    -       Rounded shoulders  -       Forward head  Sensation:    -       Intact  RLE ROM: WFL except LIMITED AROM AND PROM AT ANKLE  RLE Strength: NT AT ANKLE, 4-/5 AT KNEE, NT AT HIP DUE TO PAIN  LLE ROM: WFL  LLE Strength: GROSSLY 4/5    Functional Mobility:  Bed Mobility:     Rolling Left:  moderate assistance  Scooting: moderate assistance  Supine to Sit: moderate assistance  Transfers:     Sit to Stand:  moderate assistance with no AD  Bed to Chair: moderate assistance with  no AD  using  Step Transfer  Gait: PT TOOK APPROX. 4 SMALL STEPS WITH MODA TO TF FROM BED TO CHAIR, CUES FOR UPRIGHT POSTURE, QUICK TO FATIGUE  Balance: FAIR SITTING BALANCE WHILE SEATED EOB FOR APPROX. 15 MINUTES NO BUE SUPPORT SBA, POOR DYNAMIC BALANCE DURING TF TO CHAIR  BP IN SUPINE: 102/65  BP IN SITTIN/73    AM-PAC 6 CLICK MOBILITY  Total Score:10     Treatment & Education:  PT EDUCATED IN ROLE OF P.T. AND POC IN ACUTE CARE HOSPITAL SETTING, ENCOURAGED TO INCREASE TIME OOB IN CHAIR TO TOLERANCE, EDUCATED IN AND ENCOURAGED TO PERFORM BLE THEREX WHILE SEATED OR SUPINE THROUGHOUT THE DAY TO TOLERANCE: HIP FLEX/EXT, QUAD SET, LAQ, HEEL SLIDES, AP'S.  PT AGREEABLE TO REQUESTS  PT EDUCATED ON RISK FOR FALLS DUE TO  "GENERALIZED WEAKNESS, EDUCATED ON "CALL DON'T FALL", ENCOURAGED TO CALL FOR ASSISTANCE WITH ALL NEEDS SUCH AS BED<>CHAIR TRANSFERS OR TRIPS TO BATHROOM, PT AGREEABLE TO SAFETY PRECAUTIONS    Patient left up in chair with all lines intact, call button in reach, chair alarm on, NURSE notified, and NURS present.    GOALS:   Multidisciplinary Problems       Physical Therapy Goals          Problem: Physical Therapy    Goal Priority Disciplines Outcome Goal Variances Interventions   Physical Therapy Goal     PT, PT/OT      Description: LTG'S TO BE MET IN 14 DAYS (5-23-23)  PT WILL REQUIRE CGA FOR BED MOBILITY  PT WILL REQUIRE CGA FOR BED<>CHAIR TF'S  PT WILL  FEET WITH RW AND MILY                         History:     Past Medical History:   Diagnosis Date    Chronic pain     COPD (chronic obstructive pulmonary disease)     GERD (gastroesophageal reflux disease)     HTN (hypertension)     Kidney stone     Narcotic dependence     Stroke        Past Surgical History:   Procedure Laterality Date    CAROTID ENDARTERECTOMY      CHOLECYSTECTOMY      FACIAL RECONSTRUCTION SURGERY      HYSTERECTOMY      TONSILLECTOMY         Time Tracking:     PT Received On: 05/09/23  PT Start Time: 0815     PT Stop Time: 0855  PT Total Time (min): 40 min     Billable Minutes: Evaluation 15 and Therapeutic Activity 25 05/09/2023  "

## 2023-05-09 NOTE — PT/OT/SLP EVAL
"Occupational Therapy   Evaluation    Name: Margie Zamudio  MRN: [id number]  Admitting Diagnosis: Sepsis with acute hypoxic respiratory failure  Recent Surgery: * No surgery found *      Recommendations:     Discharge Recommendations: nursing facility, skilled, home health OT (depending on progress)  Discharge Equipment Recommendations:  bedside commode, walker, rolling  Barriers to discharge:  Inaccessible home environment    Assessment:     Margie Zamudio is a 67 y.o. female with a medical diagnosis of Sepsis with acute hypoxic respiratory failure.  She presents with the following performance deficits affecting function: weakness, impaired endurance, impaired self care skills, impaired functional mobility, gait instability, impaired balance, decreased coordination, decreased upper extremity function, decreased lower extremity function, decreased safety awareness, pain, decreased ROM, impaired cardiopulmonary response to activity.      Rehab Prognosis: Good; patient would benefit from acute skilled OT services to address these deficits and reach maximum level of function.       Plan:     Patient to be seen 2 x/week to address the above listed problems via self-care/home management, therapeutic activities, therapeutic exercises  Plan of Care Expires: 05/23/23  Plan of Care Reviewed with: patient    Subjective     Chief Complaint: weakness "I think I had another stroke."  Patient/Family Comments/goals: get OOB and get better    Occupational Profile:  Living Environment: lives with iliana and his son in a 1 story house with 15 steps and B railing to enter.   Previous level of function: Pt Mod (I) with functional mobility using rollator or SPC household distances. Pt uses w/c for community distances. Pt's family helps pt climb steps. Pt requires assist with bathing and dressing ADLs. Home O2 dependent. Previous CVA affecting R side.  Roles and Routines: does not drive.  Equipment Used at Home: oxygen, rollator, cane, " straight, shower chair  Assistance upon Discharge: family    Pain/Comfort:  Pain Rating 1: 5/10  Location - Side 1: Right  Location - Orientation 1: generalized  Location 1: hip  Pain Addressed 1: Reposition, Distraction  Pain Rating Post-Intervention 1: 5/10    Objective:     Communicated with: Nurse and epic chart review prior to session.  Patient found supine with telemetry, peripheral IV, blood pressure cuff, alvarez catheter, arterial line, pulse ox (continuous), oxygen upon OT entry to room.    General Precautions: Standard, fall, special contact, respiratory  Orthopedic Precautions: N/A  Braces: N/A  Respiratory Status: Nasal cannula, flow 4 L/min    Occupational Performance:    Bed Mobility:    Patient completed Rolling/Turning to Right with moderate assistance  Patient completed Scooting/Bridging with moderate assistance  Patient completed Supine to Sit with moderate assistance    Functional Mobility/Transfers:  Patient completed Sit <> Stand Transfer with moderate assistance  with  hand-held assist   Patient completed Bed <> Chair Transfer using Step Transfer technique with moderate assistance with hand-held assist    Activities of Daily Living:  Feeding:  maximal assistance drink from cup  Lower Body Dressing: total assistance joes socks EOB    Cognitive/Visual Perceptual:  Cognitive/Psychosocial Skills:     -       Oriented to: Person, Place, and Time   -       Follows Commands/attention:Follows multistep  commands  -       Communication: clear/fluent  -       Safety awareness/insight to disability: impaired     Physical Exam:  Sensation:    -       Intact  Dominant hand:    -       right  Upper Extremity Range of Motion:     -       Right Upper Extremity: Deficits: Limited AROM grossly from previous CVA  -       Left Upper Extremity: WNL  Upper Extremity Strength:    -       Right Upper Extremity: 3-/5 grossly; ~60 degrees shoulder flexion, 45 degrees elbow flexion  -       Left Upper Extremity: 4/5  grossly   Strength:    -       Right Upper Extremity: Deficits: unable to complete, 1/5  -       Left Upper Extremity: WFL    AMPAC 6 Click ADL:  AMPA Total Score: 12    Treatment & Education:  BP in supine: 102/65  BP in sitting 105/73  Pt reporting increased SOB with exertion, SpO2 94%.  Pt sitting EOB ~15 minutes with SBA for unsupported sitting balance. Patient educated on role of OT in acute setting and benefits of participation. Educated on techniques to use to increase independence and decrease fall risk with functional transfers. Educated on importance of OOB activity and calling for A to transfer back to bed. Encouraged completion of B UE AROM therex throughout the day to tolerance to increase functional strength and activity tolerance. Educated patient on importance of increased tolerance to upright position and direct impact on CV endurance and strength. Patient encouraged to sit up in chair for a minimum of 2 consecutive hours per day. Patient stated understanding and in agreement with POC.     Patient left up in chair with all lines intact, call button in reach, chair alarm on, nurse notified, and nurse present    GOALS:   Multidisciplinary Problems       Occupational Therapy Goals          Problem: Occupational Therapy    Goal Priority Disciplines Outcome Interventions   Occupational Therapy Goal     OT, PT/OT     Description: Goals to be met by: 5/23/23     Patient will increase functional independence with ADLs by performing:    Grooming while standing at sink with Supervision.  Toileting from bedside commode with Set-up Assistance for hygiene and clothing management.   Stand pivot transfers with Supervision.  Upper extremity exercise program x15 reps per handout, with assistance as needed.                         History:     Past Medical History:   Diagnosis Date    Chronic pain     COPD (chronic obstructive pulmonary disease)     GERD (gastroesophageal reflux disease)     HTN (hypertension)      Kidney stone     Narcotic dependence     Stroke          Past Surgical History:   Procedure Laterality Date    CAROTID ENDARTERECTOMY      CHOLECYSTECTOMY      FACIAL RECONSTRUCTION SURGERY      HYSTERECTOMY      TONSILLECTOMY         Time Tracking:     OT Date of Treatment: 05/09/23  OT Start Time: 0850  OT Stop Time: 0915  OT Total Time (min): 25 min    Billable Minutes:Evaluation 15  Therapeutic Activity 10    5/9/2023  Lidia Bolaños, OT

## 2023-05-09 NOTE — ASSESSMENT & PLAN NOTE
Broad spectrum antibiotics ordered  Monitor culture reports and adjust antibiotics as indicated   regular

## 2023-05-09 NOTE — PLAN OF CARE
Pt on bipap, tolerating well. K replacement given, see MAR. Rojas catheter in place, see flow sheets for UOP. VSS. Heparin and bicarb gtts continued- see MAR. POC reviewed with pt. Fall and safety precautions in place, call light within reach.

## 2023-05-09 NOTE — ASSESSMENT & PLAN NOTE
67-year-old female with PMHx for hypertension, COPD, stroke, chronic pain, narcotic dependence, anxiety, depression, and tobacco abuse admitted for pneumonia/sepsis with hypxemia.  EKG shows sinus tachycardia, LBBB isolated lead V4 with q wave and nonspecific ST elevation. Troponin is elevated at around 2. Sx are SOB, no CP.    Continue IV heparin, add asa, check echo, NSTEMI vs demand ischemia, consider cath once sepsis stable  Continue supportive care

## 2023-05-09 NOTE — ED PROVIDER NOTES
SCRIBE #1 NOTE: I, Candi Bonilla, am scribing for, and in the presence of, Murphy De Leon MD. I have scribed the entire note.       History     Chief Complaint   Patient presents with    Shortness of Breath     Pt c/o fo SOB. O2 in the 60s on fire arrival. Arrives on CPAP, breathless and difficulty speaking. 125 of solumedrol and duo nebs x's 3 given in route.      Review of patient's allergies indicates:   Allergen Reactions    Morphine Itching    Fentanyl Itching    Hydromorphone Itching         History of Present Illness     HPI    5/8/2023, 9:26 PM  History obtained from the patient and [facility]  History is limited due to CPAP and pt's acuity of condition      History of Present Illness: Margie Zamudio is a 67 y.o. female patient who presents to the Emergency Department for evaluation of SOB which onset gradually a few hours PTA and progressively worsening. On scene, pt's O2 sat was in the 60s and is on CPAP on arrival to ED. No mitigating or exacerbating factors reported. Associated sxs include N/V which onset 1 day PTA, cough which onset 1 month PTA, fever, and difficulty speaking. Patient denies any CP. Prior Tx includes 125 of Solumedrol and x3 Duonebs administered by [facility] en route. No further complaints or concerns at this time.       Arrival mode: [facility]    PCP: No primary care provider on file.        Past Medical History:  Past Medical History:   Diagnosis Date    Chronic pain     COPD (chronic obstructive pulmonary disease)     GERD (gastroesophageal reflux disease)     HTN (hypertension)     Kidney stone     Narcotic dependence     Stroke        Past Surgical History:  Past Surgical History:   Procedure Laterality Date    CAROTID ENDARTERECTOMY      CHOLECYSTECTOMY      FACIAL RECONSTRUCTION SURGERY      HYSTERECTOMY      TONSILLECTOMY           Family History:  Family History   Problem Relation Age of Onset    Heart disease Mother     Heart disease Father     Kidney cancer Brother        Social  History:  Social History     Tobacco Use    Smoking status: Every Day     Packs/day: 0.50     Types: Cigarettes    Smokeless tobacco: Never   Substance and Sexual Activity    Alcohol use: Never    Drug use: Never    Sexual activity: Not on file        Review of Systems     Review of Systems   Unable to perform ROS: Severe respiratory distress   Constitutional:  Positive for fever.   Respiratory:  Positive for cough and shortness of breath.    Cardiovascular:  Negative for chest pain.   Neurological:  Positive for speech difficulty.      Physical Exam     Initial Vitals   BP Pulse Resp Temp SpO2   05/08/23 2128 05/08/23 2124 05/08/23 2124 05/08/23 2136 05/08/23 2124   (!) 146/100 (!) 133 (!) 38 (!) 101.1 °F (38.4 °C) 100 %      MAP       --                 Physical Exam  Nursing Notes and Vital Signs Reviewed.  Constitutional: Patient is in severe distress. Well-developed and well-nourished.  Head: Atraumatic. Normocephalic.  Eyes: PERRL. EOM intact. Conjunctivae are not pale. No scleral icterus.  ENT: Mucous membranes are moist. Oropharynx is clear and symmetric.    Neck: Supple. Full ROM. No lymphadenopathy.  Cardiovascular: Tachycardic. Regular rhythm. No murmurs, rubs, or gallops. Distal pulses are 2+ and symmetric.  Pulmonary/Chest: Severe respiratory distress. Coarse bilateral breath sounds. No wheezing or rales.  Abdominal: Soft and non-distended.  There is no tenderness.  No rebound, guarding, or rigidity. Good bowel sounds.  Genitourinary: No CVA tenderness  Musculoskeletal: Moves all extremities. No obvious deformities. No edema.  Skin: Warm and dry.  Neurological:  Awake. GCS 14. Appears confused. No acute focal neurological deficits are appreciated.       ED Course   Critical Care    Date/Time: 5/8/2023 10:24 PM  Performed by: Murphy De Leon MD  Authorized by: Murphy De Leon MD   Direct patient critical care time: 17 minutes  Additional history critical care time: 8 minutes  Ordering / reviewing critical  care time: 6 minutes  Documentation critical care time: 5 minutes  Consulting other physicians critical care time: 5 minutes  Consult with family critical care time: 4 minutes  Total critical care time (exclusive of procedural time) : 45 minutes  Critical care time was exclusive of separately billable procedures and treating other patients and teaching time.  Critical care was necessary to treat or prevent imminent or life-threatening deterioration of the following conditions: sepsis and respiratory failure.  Critical care was time spent personally by me on the following activities: discussions with consultants, blood draw for specimens, development of treatment plan with patient or surrogate, interpretation of cardiac output measurements, evaluation of patient's response to treatment, examination of patient, obtaining history from patient or surrogate, ordering and performing treatments and interventions, ordering and review of laboratory studies, ordering and review of radiographic studies, pulse oximetry, re-evaluation of patient's condition and review of old charts.      ED Vital Signs:  Vitals:    05/08/23 2150 05/08/23 2200 05/08/23 2221 05/08/23 2249   BP:  (!) 146/76     Pulse: (!) 130 (!) 132 (!) 134    Resp: (!) 36 (!) 37 (!) 32    Temp:       TempSrc:       SpO2: 100% 98% 95% 96%   Weight:       Height:        05/08/23 2332 05/09/23 0017 05/09/23 0135 05/09/23 0137   BP: (!) 122/90 124/89     Pulse: (!) 124 (!) 55 (!) 185 (!) 162   Resp: (!) 32 (!) 26 (!) 23 (!) 26   Temp:       TempSrc:       SpO2: (!) 92%  (!) 85% (!) 83%   Weight:       Height:        05/09/23 0308 05/09/23 0323 05/09/23 0324 05/09/23 0326   BP:       Pulse: 106 109 109 108   Resp:  (!) 25 (!) 23 (!) 24   Temp:       TempSrc:       SpO2:   97% 98%   Weight:       Height:        05/09/23 0330 05/09/23 0331 05/09/23 0333   BP:   130/85   Pulse: 107  109   Resp: (!) 22  (!) 24   Temp:  99.6 °F (37.6 °C)    TempSrc:  Rectal    SpO2: 96%   98%   Weight:      Height:          Abnormal Lab Results:  Labs Reviewed   CBC W/ AUTO DIFFERENTIAL - Abnormal; Notable for the following components:       Result Value    WBC 25.68 (*)     RBC 5.77 (*)     Hemoglobin 16.3 (*)     Hematocrit 54.6 (*)     MCHC 29.9 (*)     Immature Granulocytes 1.6 (*)     Gran # (ANC) 19.1 (*)     Immature Grans (Abs) 0.42 (*)     Mono # 1.3 (*)     Gran % 74.3 (*)     Lymph % 17.5 (*)     All other components within normal limits   COMPREHENSIVE METABOLIC PANEL - Abnormal; Notable for the following components:    CO2 15 (*)     Glucose 364 (*)     Creatinine 1.8 (*)     AST 86 (*)     Anion Gap 20 (*)     eGFR 31 (*)     All other components within normal limits   B-TYPE NATRIURETIC PEPTIDE - Abnormal; Notable for the following components:    BNP 1,173 (*)     All other components within normal limits   TROPONIN I - Abnormal; Notable for the following components:    Troponin I 2.476 (*)     All other components within normal limits   LACTIC ACID, PLASMA - Abnormal; Notable for the following components:    Lactate (Lactic Acid) 7.0 (*)     All other components within normal limits    Narrative:      LA critical result(s) called and verbal readback obtained from   TAZ CARBAJAL RN    OK TO RELEASE PER TAZ CARBAJAL RN by [email or web address] 05/08/2023 23:02   URINALYSIS, REFLEX TO URINE CULTURE - Abnormal; Notable for the following components:    Appearance, UA Hazy (*)     Protein, UA 3+ (*)     Glucose, UA 2+ (*)     Occult Blood UA 3+ (*)     Leukocytes, UA 1+ (*)     All other components within normal limits    Narrative:     Specimen Source->Urine   TROPONIN I - Abnormal; Notable for the following components:    Troponin I 3.118 (*)     All other components within normal limits   URINALYSIS MICROSCOPIC - Abnormal; Notable for the following components:    RBC, UA 7 (*)     WBC, UA 24 (*)     WBC Clumps, UA Occasional (*)     Hyaline Casts, UA 4 (*)     All other components within normal limits     Narrative:     Specimen Source->Urine   PROTIME-INR - Abnormal; Notable for the following components:    Prothrombin Time 14.4 (*)     INR 1.4 (*)     All other components within normal limits   ISTAT PROCEDURE - Abnormal; Notable for the following components:    POC PH 7.126 (*)     POC PCO2 46.7 (*)     POC PO2 112 (*)     POC HCO3 15.4 (*)     All other components within normal limits   ISTAT PROCEDURE - Abnormal; Notable for the following components:    POC PH 7.199 (*)     POC PO2 108 (*)     POC HCO3 15.8 (*)     All other components within normal limits   CULTURE, BLOOD   CULTURE, BLOOD   CULTURE, URINE   CLOSTRIDIUM DIFFICILE   CULTURE, STOOL   CULTURE, RESPIRATORY   SARS-COV-2 RNA AMPLIFICATION, QUAL   BETA - HYDROXYBUTYRATE, SERUM   BETA - HYDROXYBUTYRATE, SERUM   APTT   TSH    Narrative:     If not completed within last 6 months  Fasting; if not completed within last 12 months   T4, FREE    Narrative:     If not completed within last 6 months  Fasting; if not completed within last 12 months   HEMOGLOBIN A1C   APTT   CBC W/ AUTO DIFFERENTIAL   TROPONIN I   BASIC METABOLIC PANEL   MAGNESIUM   CBC W/ AUTO DIFFERENTIAL   H. PYLORI ANTIGEN, STOOL   PANCREATIC ELASTASE, FECAL   FECAL FAT, QUALITATIVE   OCCULT BLOOD X 1, STOOL   WBC, STOOL   ROTAVIRUS ANTIGEN, STOOL   CALPROTECTIN, STOOL   GIARDIA/CRYPTOSPORIDIUM (EIA)   STOOL EXAM-OVA,CYSTS,PARASITES   IRON   LIPID PANEL   LEGIONELLA ANTIGEN, URINE RANDOM   DRUG SCREEN PANEL, URINE EMERGENCY   LACTIC ACID, PLASMA   POCT GLUCOSE MONITORING CONTINUOUS   POCT RAPID INFLUENZA A/B        All Lab Results:  Results for orders placed or performed during the hospital encounter of 05/08/23   CBC auto differential   Result Value Ref Range    WBC 25.68 (H) 3.90 - 12.70 K/uL    RBC 5.77 (H) 4.00 - 5.40 M/uL    Hemoglobin 16.3 (H) 12.0 - 16.0 g/dL    Hematocrit 54.6 (H) 37.0 - 48.5 %    MCV 95 82 - 98 fL    MCH 28.2 27.0 - 31.0 pg    MCHC 29.9 (L) 32.0 - 36.0 g/dL    RDW  13.1 11.5 - 14.5 %    Platelets 384 150 - 450 K/uL    MPV 9.8 9.2 - 12.9 fL    Immature Granulocytes 1.6 (H) 0.0 - 0.5 %    Gran # (ANC) 19.1 (H) 1.8 - 7.7 K/uL    Immature Grans (Abs) 0.42 (H) 0.00 - 0.04 K/uL    Lymph # 4.5 1.0 - 4.8 K/uL    Mono # 1.3 (H) 0.3 - 1.0 K/uL    Eos # 0.2 0.0 - 0.5 K/uL    Baso # 0.19 0.00 - 0.20 K/uL    nRBC 0 0 /100 WBC    Gran % 74.3 (H) 38.0 - 73.0 %    Lymph % 17.5 (L) 18.0 - 48.0 %    Mono % 5.2 4.0 - 15.0 %    Eosinophil % 0.7 0.0 - 8.0 %    Basophil % 0.7 0.0 - 1.9 %    Differential Method Automated    Comprehensive metabolic panel   Result Value Ref Range    Sodium 139 136 - 145 mmol/L    Potassium 4.3 3.5 - 5.1 mmol/L    Chloride 104 95 - 110 mmol/L    CO2 15 (L) 23 - 29 mmol/L    Glucose 364 (H) 70 - 110 mg/dL    BUN 18 8 - 23 mg/dL    Creatinine 1.8 (H) 0.5 - 1.4 mg/dL    Calcium 9.3 8.7 - 10.5 mg/dL    Total Protein 7.2 6.0 - 8.4 g/dL    Albumin 3.5 3.5 - 5.2 g/dL    Total Bilirubin 0.2 0.1 - 1.0 mg/dL    Alkaline Phosphatase 121 55 - 135 U/L    AST 86 (H) 10 - 40 U/L    ALT 41 10 - 44 U/L    Anion Gap 20 (H) 8 - 16 mmol/L    eGFR 31 (A) >60 mL/min/1.73 m^2   Brain natriuretic peptide   Result Value Ref Range    BNP 1,173 (H) 0 - 99 pg/mL   Troponin I   Result Value Ref Range    Troponin I 2.476 (H) 0.000 - 0.026 ng/mL   Lactic acid, plasma   Result Value Ref Range    Lactate (Lactic Acid) 7.0 (HH) 0.5 - 2.2 mmol/L   COVID-19 Rapid Screening   Result Value Ref Range    SARS-CoV-2 RNA, Amplification, Qual Negative Negative   Urinalysis, Reflex to Urine Culture Urine, Catheterized    Specimen: Urine   Result Value Ref Range    Specimen UA Urine, Catheterized     Color, UA Yellow Yellow, Straw, Beronica    Appearance, UA Hazy (A) Clear    pH, UA 7.0 5.0 - 8.0    Specific Gravity, UA 1.020 1.005 - 1.030    Protein, UA 3+ (A) Negative    Glucose, UA 2+ (A) Negative    Ketones, UA Negative Negative    Bilirubin (UA) Negative Negative    Occult Blood UA 3+ (A) Negative    Nitrite,  UA Negative Negative    Urobilinogen, UA Negative <2.0 EU/dL    Leukocytes, UA 1+ (A) Negative   Troponin I   Result Value Ref Range    Troponin I 3.118 (H) 0.000 - 0.026 ng/mL   Urinalysis Microscopic   Result Value Ref Range    RBC, UA 7 (H) 0 - 4 /hpf    WBC, UA 24 (H) 0 - 5 /hpf    WBC Clumps, UA Occasional (A) None-Rare    Bacteria Rare None-Occ /hpf    Hyaline Casts, UA 4 (A) 0-1/lpf /lpf    Microscopic Comment SEE COMMENT    Beta - Hydroxybutyrate, Serum   Result Value Ref Range    Beta-Hydroxybutyrate 0.1 0.0 - 0.5 mmol/L   APTT   Result Value Ref Range    aPTT 26.3 21.0 - 32.0 sec   Protime-INR   Result Value Ref Range    Prothrombin Time 14.4 (H) 9.0 - 12.5 sec    INR 1.4 (H) 0.8 - 1.2   TSH   Result Value Ref Range    TSH 1.219 0.400 - 4.000 uIU/mL   T4, free   Result Value Ref Range    Free T4 1.04 0.71 - 1.51 ng/dL   ISTAT PROCEDURE   Result Value Ref Range    POC PH 7.126 (LL) 7.35 - 7.45    POC PCO2 46.7 (H) 35 - 45 mmHg    POC PO2 112 (H) 80 - 100 mmHg    POC HCO3 15.4 (L) 24 - 28 mmol/L    POC BE -14 -2 to 2 mmol/L    POC SATURATED O2 96 95 - 100 %    Sample ARTERIAL     Site LR     Allens Test Pass     DelSys CPAP/BiPAP     Mode BiPAP     FiO2 40     IP 14     EP 8    ISTAT PROCEDURE   Result Value Ref Range    POC PH 7.199 (LL) 7.35 - 7.45    POC PCO2 40.5 35 - 45 mmHg    POC PO2 108 (H) 80 - 100 mmHg    POC HCO3 15.8 (L) 24 - 28 mmol/L    POC BE -12 -2 to 2 mmol/L    POC SATURATED O2 97 95 - 100 %    Rate 20     Sample ARTERIAL     Site Rhea/UAC     Allens Test N/A     DelSys NRB     Mode BiPAP     FiO2 40     IP 16     EP 8          Imaging Results:  Imaging Results              CT Head Without Contrast (Final result)  Result time 05/08/23 23:28:07      Final result by Fish Barnhart MD (05/08/23 23:28:07)                   Impression:      Atrophy and chronic white matter changes.  Old infarcts involving the left periventricular and cortical hemisphere    No hemorrhage mass effect or midline  shift    All CT scans   are performed using dose optimization techniques including the following: automated exposure control; adjustment of the mA and/or kV; use of iterative reconstruction technique.  Dose modulation was employed for ALARA by means of: Automated exposure control; adjustment of the mA and/or kV according to patient size (this includes techniques or standardized protocols for targeted exams where dose is matched to indication/reason for exam; i.e. extremities or head); and/or use of iterative reconstructive technique.      Electronically signed by: Fish Barnhart  Date:    05/08/2023  Time:    23:28               Narrative:    EXAMINATION:  CT HEAD WITHOUT CONTRAST    CLINICAL HISTORY:  Mental status change, unknown cause;    TECHNIQUE:  Low dose axial CT images obtained throughout the head without intravenous contrast. Sagittal and coronal reconstructions were performed.    COMPARISON:  None.    FINDINGS:  Atrophy and chronic white matter changes.  Old infarct involving the left posterior periventricular and frontal parietal cortex    No parenchymal mass, hemorrhage, edema or major vascular distribution infarct.    Skull/extracranial contents (limited evaluation): No fracture. Mastoid air cells and paranasal sinuses are essentially clear.                                       X-Ray Chest AP Portable (Final result)  Result time 05/08/23 21:42:06      Final result by Fish Barnhart MD (05/08/23 21:42:06)                   Impression:      Moderate interstitial and alveolar perihilar opacities may relate to pulmonary edema or infectious process.  Correlate clinically      Electronically signed by: Fish Barnhart  Date:    05/08/2023  Time:    21:42               Narrative:    EXAMINATION:  XR CHEST AP PORTABLE    CLINICAL HISTORY:  Shortness of breath    TECHNIQUE:  Single frontal view of the chest was performed.    COMPARISON:  None    FINDINGS:  Moderate interstitial and alveolar opacities consistent  with pulmonary edema.  Atypical infectious process not excluded.  Is trace sub pulmonic pleural effusions may be present.  Non enlarged cardiac silhouette.    Bones are intact.                                       The EKG was ordered, reviewed, and independently interpreted by the ED provider.  Interpretation time: 21:22  Rate: 132 BPM  Rhythm: sinus tachycardia  Interpretation: ST elevation in V4. Does not meet STEMI criteria. Bundle branch block.    Interpretation time: 2123  Rate: 130 BPM  Rhythm: sinus tachycardia  Interpretation: ST elevation in V4. Does not meet STEMI criteria. Bundle branch block.           The Emergency Provider reviewed the vital signs and test results, which are outlined above.     ED Discussion     11:26 PM: Discussed pt's case with Mayra Stark NP (Pulmonary Disease) who agrees with plan to administer 500 bolus.    12:25 AM: Discussed pt's case with Mayra Stark NP (Pulmonary Disease) who recommends repeat ABG.    4:01 AM: Discussed case with Mayra Stark NP (Pulmonary Disease).  agrees with current care and management of pt and accepts admission.   Admitting Service: Intensive Care  Admitting Physician:   Admit to: ICU    4:01 AM: Re-evaluated pt. I have discussed test results, shared treatment plan, and the need for admission with patient and family at bedside. Pt and family express understanding at this time and agree with all information. All questions answered. Pt and family have no further questions or concerns at this time. Pt is ready for admit.      ED Course as of 05/09/23 0404   Mon May 08, 2023   2134 Discussing case with Dr. Diallo.  Asked if he wanted to activate STEMI.  He reviewed EKGs and says no STEMI criteria.  Does not recommend activating cath lab at this time. [DP]   2317 Troponin 2.4.  BNP 1100.  Discussed these findings with Dr. Diallo, who recommends continuing medical management.  No indication for heart catheterization at this time [DP]      ED  Course User Index  [DP] Murphy De Leon MD     Medical Decision Making:   Initial Assessment:   67-year-old female presenting in severe respiratory distress.  She is tachypneic and tachycardic on CPAP on arrival.  She does have a history of COPD, but I do not appreciate any wheezing.  She was given 3 DuoNebs prior to arrival however.  EKG questionable for possible STEMI.  Will discuss immediately with interventional cardiology  Differential Diagnosis:   STEMI, NSTEMI, pulmonary edema, pneumonia  Independently Interpreted Test(s):   I have ordered and independently interpreted X-rays - see prior notes.  I have ordered and independently interpreted EKG Reading(s) - see prior notes  Clinical Tests:   Lab Tests: Ordered and Reviewed  Radiological Study: Ordered and Reviewed  Medical Tests: Ordered and Reviewed  ED Management:  NSTEMI with acute respiratory failure.  Mixed picture of infectious versus pulmonary edema.  Treated for both and admitted to ICU         ED Medication(s):  Medications   heparin 25,000 units in dextrose 5% 250 mL (100 units/mL) infusion LOW INTENSITY nomogram - [facility] (12 Units/kg/hr × 42.6 kg Intravenous Handoff 5/9/23 4881)   heparin 25,000 units in dextrose 5% (100 units/ml) IV bolus from bag - ADDITIONAL PRN BOLUS - 60 units/kg (max bolus 4000 units) (has no administration in time range)   heparin 25,000 units in dextrose 5% (100 units/ml) IV bolus from bag - ADDITIONAL PRN BOLUS - 30 units/kg (max bolus 4000 units) (has no administration in time range)   vancomycin - pharmacy to dose (has no administration in time range)   ceFEPIme (MAXIPIME) 2 g in dextrose 5 % in water (D5W) 5 % 50 mL IVPB (MB+) (2 g Intravenous New Bag 5/9/23 0302)   glucagon (human recombinant) injection 1 mg (has no administration in time range)   dextrose 10% bolus 125 mL 125 mL (has no administration in time range)   dextrose 10% bolus 250 mL 250 mL (has no administration in time range)   insulin aspart U-100 pen 1-10  Units (has no administration in time range)   vancomycin 750 mg in dextrose 5 % (D5W) 250 mL IVPB (Vial-Mate) (750 mg Intravenous New Bag 5/9/23 0350)   sodium chloride 0.9% flush 10 mL (has no administration in time range)   ondansetron injection 4 mg (4 mg Intravenous Given 5/9/23 0300)   acetaminophen tablet 650 mg (has no administration in time range)   melatonin tablet 6 mg (has no administration in time range)   sodium chloride 0.9% flush 10 mL (has no administration in time range)   pravastatin tablet 40 mg (has no administration in time range)   pantoprazole EC tablet 40 mg (has no administration in time range)   guaiFENesin 12 hr tablet 600 mg (has no administration in time range)   mupirocin 2 % ointment (has no administration in time range)   sodium bicarbonate 100 mEq in sterile water 1,000 mL infusion (has no administration in time range)   methylPREDNISolone sodium succinate injection 80 mg (has no administration in time range)   levalbuterol nebulizer solution 1.2495 mg (has no administration in time range)   cefTRIAXone (ROCEPHIN) 1 g in dextrose 5 % in water (D5W) 5 % 50 mL IVPB (MB+) (0 g Intravenous Stopped 5/8/23 2301)   ondansetron injection 4 mg (4 mg Intravenous Given 5/8/23 2152)   azithromycin (ZITHROMAX) 500 mg in dextrose 5 % (D5W) 250 mL IVPB (Vial-Mate) (0 mg Intravenous Stopped 5/9/23 0054)   acetaminophen suppository 650 mg (650 mg Rectal Given 5/8/23 2354)   albuterol-ipratropium 2.5 mg-0.5 mg/3 mL nebulizer solution 3 mL (3 mLs Nebulization Given 5/8/23 2332)   heparin 25,000 units in dextrose 5% (100 units/ml) IV bolus from bag INITIAL BOLUS (max bolus 4000 units) (2,560 Units Intravenous Bolus from Bag 5/9/23 0258)   sodium chloride 0.9% bolus 500 mL 500 mL (0 mLs Intravenous Stopped 5/9/23 0121)   sodium bicarbonate solution 100 mEq (100 mEq Intravenous Given 5/9/23 0150)       Current Discharge Medication List                  Scribe Attestation:   Scribe #1: I performed the  above scribed service and the documentation accurately describes the services I performed. I attest to the accuracy of the note.     Attending:   Physician Attestation Statement for Scribe #1: I, Murphy De Leon MD, personally performed the services described in this documentation, as scribed by Candi Bonilla, in my presence, and it is both accurate and complete.           Clinical Impression       ICD-10-CM ICD-9-CM   1. NSTEMI (non-ST elevated myocardial infarction)  I21.4 410.70   2. SOB (shortness of breath)  R06.02 786.05   3. Elevated troponin  R77.8 790.6   4. Sepsis with acute hypoxic respiratory failure  A41.9 038.9    R65.20 995.91    J96.01 518.81       Disposition:   Disposition: Admitted  Condition: Serious       Murphy De Leon MD  05/19/23 2034

## 2023-05-09 NOTE — ASSESSMENT & PLAN NOTE
Glucose in the 300s  No previous history of diabetes per review of records  Serial glucose checks with sliding scale insulin, as she is requiring IV steroids  Check hemoglobin A1c

## 2023-05-09 NOTE — SUBJECTIVE & OBJECTIVE
Past Medical History:   Diagnosis Date    Chronic pain     COPD (chronic obstructive pulmonary disease)     GERD (gastroesophageal reflux disease)     HTN (hypertension)     Kidney stone     Narcotic dependence     Stroke        Past Surgical History:   Procedure Laterality Date    CAROTID ENDARTERECTOMY      CHOLECYSTECTOMY      FACIAL RECONSTRUCTION SURGERY      HYSTERECTOMY      TONSILLECTOMY         Review of patient's allergies indicates:   Allergen Reactions    Morphine Itching    Fentanyl Itching    Hydromorphone Itching       Family History       Problem Relation (Age of Onset)    Heart disease Mother, Father    Kidney cancer Brother          Tobacco Use    Smoking status: Every Day     Packs/day: 0.50     Types: Cigarettes    Smokeless tobacco: Never   Substance and Sexual Activity    Alcohol use: Never    Drug use: Never    Sexual activity: Not on file         Review of Systems   Unable to perform ROS: Acuity of condition (limited due to BIPAP)   Constitutional:  Positive for chills, fatigue and fever.   HENT:  Positive for congestion.    Eyes: Negative.    Respiratory:  Positive for cough, shortness of breath and wheezing.         White sputum   Cardiovascular: Negative.    Gastrointestinal:  Positive for abdominal pain, diarrhea, nausea and vomiting. Negative for abdominal distention.   Endocrine: Negative.    Genitourinary:  Positive for decreased urine volume.   Musculoskeletal:  Positive for arthralgias and back pain.   Skin: Negative.    Allergic/Immunologic: Negative.    Neurological:  Positive for weakness.   Hematological: Negative.    Psychiatric/Behavioral:  The patient is nervous/anxious.    Objective:     Vital Signs (Most Recent):  Temp: (!) 101.1 °F (38.4 °C) (MD notified) (05/08/23 2136)  Pulse: (!) 162 (05/09/23 0137)  Resp: (!) 26 (05/09/23 0137)  BP: 124/89 (05/09/23 0017)  SpO2: (!) 83 % (05/09/23 0137) Vital Signs (24h Range):  Temp:  [101.1 °F (38.4 °C)] 101.1 °F (38.4 °C)  Pulse:   [] 162  Resp:  [23-42] 26  SpO2:  [83 %-100 %] 83 %  BP: (122-146)/() 124/89     Weight: 42.6 kg (93 lb 14.7 oz)  Body mass index is 17.75 kg/m².      Intake/Output Summary (Last 24 hours) at 5/9/2023 0252  Last data filed at 5/9/2023 0121  Gross per 24 hour   Intake 747.41 ml   Output --   Net 747.41 ml        Physical Exam  Vitals and nursing note reviewed.   Constitutional:       General: She is in acute distress.      Appearance: She is ill-appearing and toxic-appearing. She is not diaphoretic.   HENT:      Head: Normocephalic and atraumatic.      Nose: Nose normal.      Mouth/Throat:      Mouth: Mucous membranes are moist.      Pharynx: Oropharynx is clear.   Eyes:      Extraocular Movements: Extraocular movements intact.      Pupils: Pupils are equal, round, and reactive to light.   Cardiovascular:      Rate and Rhythm: Regular rhythm. Tachycardia present.      Pulses: Normal pulses.      Heart sounds: Normal heart sounds. No murmur heard.  Pulmonary:      Effort: Respiratory distress present.      Breath sounds: Wheezing present.   Abdominal:      General: Bowel sounds are normal. There is no distension.      Palpations: Abdomen is soft.      Tenderness: There is no abdominal tenderness.   Genitourinary:     Comments: Rojas catheter  Musculoskeletal:         General: Normal range of motion.      Cervical back: Normal range of motion and neck supple.      Right lower leg: No edema.      Left lower leg: No edema.   Skin:     General: Skin is warm and dry.      Capillary Refill: Capillary refill takes 2 to 3 seconds.   Neurological:      General: No focal deficit present.      Mental Status: She is alert and oriented to person, place, and time.      Motor: Weakness present.   Psychiatric:         Mood and Affect: Mood normal.         Behavior: Behavior normal.        Vents: N/A  Oxygen Concentration (%): 40 (05/08/23 2332) BIPAP    Lines/Drains/Airways       Drain  Duration                  Urethral  Catheter 05/08/23 2150 Silicone 16 Fr. <1 day              Arterial Line  Duration             Arterial Line 05/09/23 0135 Right Radial <1 day              Peripheral Intravenous Line  Duration                  Peripheral IV - Single Lumen 05/08/23 2124 20 G Anterior;Distal;Left Forearm <1 day         Peripheral IV - Single Lumen 05/08/23 2129 20 G Right Antecubital <1 day         Peripheral IV - Single Lumen 05/08/23 2208 20 G Left Antecubital <1 day                    Significant Labs:    CBC/Anemia Profile:  Recent Labs   Lab 05/08/23 2207   WBC 25.68*   HGB 16.3*   HCT 54.6*      MCV 95   RDW 13.1        Chemistries:  Recent Labs   Lab 05/08/23 2207      K 4.3      CO2 15*   BUN 18   CREATININE 1.8*   CALCIUM 9.3   ALBUMIN 3.5   PROT 7.2   BILITOT 0.2   ALKPHOS 121   ALT 41   AST 86*       ABGs:   Recent Labs   Lab 05/09/23 0137   PH 7.199*   PCO2 40.5   HCO3 15.8*   POCSATURATED 97   BE -12     Blood Culture: No results for input(s): LABBLOO in the last 48 hours.  Coagulation:   Recent Labs   Lab 05/09/23 0139   INR 1.4*   APTT 26.3     Lactic Acid:   Recent Labs   Lab 05/08/23 2207   LACTATE 7.0*     POCT Glucose: No results for input(s): POCTGLUCOSE in the last 48 hours.  Troponin:   Recent Labs   Lab 05/08/23 2207 05/09/23 0139   TROPONINI 2.476* 3.118*     Urine Studies:   Recent Labs   Lab 05/09/23 0044   COLORU Yellow   APPEARANCEUA Hazy*   PHUR 7.0   SPECGRAV 1.020   PROTEINUA 3+*   GLUCUA 2+*   KETONESU Negative   BILIRUBINUA Negative   OCCULTUA 3+*   NITRITE Negative   UROBILINOGEN Negative   LEUKOCYTESUR 1+*   RBCUA 7*   WBCUA 24*   BACTERIA Rare   HYALINECASTS 4*     BNP 1173  Beta-hydroxybutyrate 0.1  SARS-CoV-2 RNA negative  Blood and urine cultures pending  Rapid flu A/B ordered, pending    All pertinent labs within the past 24 hours have been reviewed.    Significant Imaging:   I have reviewed all pertinent imaging results/findings within the past 24 hours.  CT Head  Without Contrast [[id number]] Resulted: 05/08/23 2328   Order Status: Completed Updated: 05/08/23 2330   Narrative:     EXAMINATION:   CT HEAD WITHOUT CONTRAST     CLINICAL HISTORY:   Mental status change, unknown cause;     TECHNIQUE:   Low dose axial CT images obtained throughout the head without intravenous contrast. Sagittal and coronal reconstructions were performed.     COMPARISON:   None.     FINDINGS:   Atrophy and chronic white matter changes.  Old infarct involving the left posterior periventricular and frontal parietal cortex     No parenchymal mass, hemorrhage, edema or major vascular distribution infarct.     Skull/extracranial contents (limited evaluation): No fracture. Mastoid air cells and paranasal sinuses are essentially clear.    Impression:       Atrophy and chronic white matter changes.  Old infarcts involving the left periventricular and cortical hemisphere     No hemorrhage mass effect or midline shift     All CT scans   are performed using dose optimization techniques including the following: automated exposure control; adjustment of the mA and/or kV; use of iterative reconstruction technique.  Dose modulation was employed for ALARA by means of: Automated exposure control; adjustment of the mA and/or kV according to patient size (this includes techniques or standardized protocols for targeted exams where dose is matched to indication/reason for exam; i.e. extremities or head); and/or use of iterative reconstructive technique.       Electronically signed by: Fish Barnhart   Date: 05/08/2023   Time: 23:28   X-Ray Chest AP Portable [[id number]] Resulted: 05/08/23 2142   Order Status: Completed Updated: 05/08/23 2144   Narrative:     EXAMINATION:   XR CHEST AP PORTABLE     CLINICAL HISTORY:   Shortness of breath     TECHNIQUE:   Single frontal view of the chest was performed.     COMPARISON:   None     FINDINGS:   Moderate interstitial and alveolar opacities consistent with pulmonary edema.   Atypical infectious process not excluded.  Is trace sub pulmonic pleural effusions may be present.  Non enlarged cardiac silhouette.     Bones are intact.    Impression:       Moderate interstitial and alveolar perihilar opacities may relate to pulmonary edema or infectious process.  Correlate clinically       Electronically signed by: Fish Barnhart   Date: 05/08/2023   Time: 21:42

## 2023-05-09 NOTE — PROGRESS NOTES
Pharmacist Renal Dose Adjustment Note    Margie Zamudio is a 67 y.o. female being treated with the medication cefepime for pneumonia.     Patient Data:    Vital Signs (Most Recent):  Temp: 97.7 °F (36.5 °C) (05/09/23 0705)  Pulse: 90 (05/09/23 1145)  Resp: (!) 21 (05/09/23 1145)  BP: 97/65 (05/09/23 1145)  SpO2: 96 % (05/09/23 1145) Vital Signs (72h Range):  Temp:  [97.7 °F (36.5 °C)-101.1 °F (38.4 °C)]   Pulse:  []   Resp:  [19-42]   BP: ()/()   SpO2:  [83 %-100 %]   Arterial Line BP: ()/(56-76)      Recent Labs   Lab 05/08/23 2207 05/09/23  0405   CREATININE 1.8* 1.3     Serum creatinine: 1.3 mg/dL 05/09/23 0405  Estimated creatinine clearance: 32.1 mL/min    Per protocol for a severe infection & for CrCl 30-60 ml/min, dose will be increased from 2 gm IV every 24 hours to 2 gm IV every 12 hours.     Pharmacist's Name: Katherine E Mcardle  Pharmacist's Extension: [phone]

## 2023-05-09 NOTE — EICU
New Patient Evaluation    HPI:  67 F history of COPD, hypertension, CVA s/p CEA, presented with progressive worsening of shortness of breath accompanied by nausea and vomiting.  Reportedly been coughing for the She was hypoxic in the 60s on EMS arrival and was placed on CPAP in the ED. Febrile at 101.1. CXR Moderate interstitial and alveolar perihilar opacities may relate to pulmonary edema or infectious process.    Camera Assessment:  /79    O2 94%  Off BiPap not in distress  Ongoing insulin drip    Data:  ABG 7.199/40/108  WBC 25.68, H/H 16.3/54.6, platelets 384  Na 139, K 4.3, CO2 15, AG 20, BUN 18, creatinine 1.8  AST 86, ALT 41  BNP 1173, Trop I 3.118 from 2,476  Lactic acid 7  TSH 1.219  UA   Head CT Atrophy and chronic white matter changes.  Old infarcts involving the left periventricular and cortical hemisphere    Assessment and Plans:   Respiratory failure, leukocytosis and febrile, on vancomycin and cefepime. Also placed on systemic steroids and bronchodilators.  NSTEMI, cardiology consulted. Placed on hepatin drip.  Hyperglycemia on sliding scale insulin

## 2023-05-09 NOTE — H&P
[facility] - Emergency Dept.  Critical Care Medicine  History & Physical    Patient Name: Margie Zamudio  MRN: [id number]  Admission Date: 5/8/2023  Hospital Length of Stay: 0 days  Code Status: Full Code  Attending Physician: Murphy Ackerman MD   Primary Care Provider: No primary care provider on file.   Principal Problem: Sepsis with acute hypoxic respiratory failure    Subjective:     HPI:  Patient is a 67-year-old female with past medical history of hypertension, COPD, GERD, hypertension, stroke, kidney stones, chronic pain, narcotic dependence, anxiety, depression, insomnia, and tobacco abuse who presented to the ED per ambulance personnel due to shortness of breath.  Per notes, EMS reported oxygen saturation in the 60s.  She arrived to the ED with CPAP in place, was noted to be breathless, had difficulty speaking.  She was given Solu-Medrol 125 mg and DuoNeb x3 while in route to the hospital.  Information is limited due to respiratory distress/BiPAP.  Her symptoms started several hours prior to arrival in ED, were gradually worsening, no mitigating or exacerbating factors reported.  She also reports nausea, vomiting, diarrhea for the past 2 days.  She states productive cough, white sputum for about 1 month, fever, difficulty speaking, she denies chest pain.  Initial EKG concerning for ST elevation in V4, sinus tach, heart rate 130, bundle-branch block.  Cardiology was urgently consulted who reviewed EKG inpatient did not require cath lab activation, due to no STEMI criteria.  CT head was done which showed no acute abnormality.  Lab workup significant for troponin 2.4, BNP 1000, WBC 84776, lactic acid 7.0, positive UTI, anion gap 20, CO2 15, creatinine 1.8, glucose 364, ABG -pH 7.1-6, bicarb 15.8.  Imaging concerning for infection versus edema. She was given 500 mL fluid bolus, acetaminophen, nebulizer treatments, Rocephin, Zithromax, and heparin drip is ordered.      Hospital/ICU Course:  No notes on file      Past Medical History:   Diagnosis Date    Chronic pain     COPD (chronic obstructive pulmonary disease)     GERD (gastroesophageal reflux disease)     HTN (hypertension)     Kidney stone     Narcotic dependence     Stroke        Past Surgical History:   Procedure Laterality Date    CAROTID ENDARTERECTOMY      CHOLECYSTECTOMY      FACIAL RECONSTRUCTION SURGERY      HYSTERECTOMY      TONSILLECTOMY         Review of patient's allergies indicates:   Allergen Reactions    Morphine Itching    Fentanyl Itching    Hydromorphone Itching       Family History       Problem Relation (Age of Onset)    Heart disease Mother, Father    Kidney cancer Brother          Tobacco Use    Smoking status: Every Day     Packs/day: 0.50     Types: Cigarettes    Smokeless tobacco: Never   Substance and Sexual Activity    Alcohol use: Never    Drug use: Never    Sexual activity: Not on file         Review of Systems   Unable to perform ROS: Acuity of condition (limited due to BIPAP)   Constitutional:  Positive for chills, fatigue and fever.   HENT:  Positive for congestion.    Eyes: Negative.    Respiratory:  Positive for cough, shortness of breath and wheezing.         White sputum   Cardiovascular: Negative.    Gastrointestinal:  Positive for abdominal pain, diarrhea, nausea and vomiting. Negative for abdominal distention.   Endocrine: Negative.    Genitourinary:  Positive for decreased urine volume.   Musculoskeletal:  Positive for arthralgias and back pain.   Skin: Negative.    Allergic/Immunologic: Negative.    Neurological:  Positive for weakness.   Hematological: Negative.    Psychiatric/Behavioral:  The patient is nervous/anxious.    Objective:     Vital Signs (Most Recent):  Temp: (!) 101.1 °F (38.4 °C) (MD notified) (05/08/23 2136)  Pulse: (!) 162 (05/09/23 0137)  Resp: (!) 26 (05/09/23 0137)  BP: 124/89 (05/09/23 0017)  SpO2: (!) 83 % (05/09/23 0137) Vital Signs (24h Range):  Temp:  [101.1 °F (38.4 °C)] 101.1 °F  (38.4 °C)  Pulse:  [] 162  Resp:  [23-42] 26  SpO2:  [83 %-100 %] 83 %  BP: (122-146)/() 124/89     Weight: 42.6 kg (93 lb 14.7 oz)  Body mass index is 17.75 kg/m².      Intake/Output Summary (Last 24 hours) at 5/9/2023 0252  Last data filed at 5/9/2023 0121  Gross per 24 hour   Intake 747.41 ml   Output --   Net 747.41 ml        Physical Exam  Vitals and nursing note reviewed.   Constitutional:       General: She is in acute distress.      Appearance: She is ill-appearing and toxic-appearing. She is not diaphoretic.   HENT:      Head: Normocephalic and atraumatic.      Nose: Nose normal.      Mouth/Throat:      Mouth: Mucous membranes are moist.      Pharynx: Oropharynx is clear.   Eyes:      Extraocular Movements: Extraocular movements intact.      Pupils: Pupils are equal, round, and reactive to light.   Cardiovascular:      Rate and Rhythm: Regular rhythm. Tachycardia present.      Pulses: Normal pulses.      Heart sounds: Normal heart sounds. No murmur heard.  Pulmonary:      Effort: Respiratory distress present.      Breath sounds: Wheezing present.   Abdominal:      General: Bowel sounds are normal. There is no distension.      Palpations: Abdomen is soft.      Tenderness: There is no abdominal tenderness.   Genitourinary:     Comments: Rojas catheter  Musculoskeletal:         General: Normal range of motion.      Cervical back: Normal range of motion and neck supple.      Right lower leg: No edema.      Left lower leg: No edema.   Skin:     General: Skin is warm and dry.      Capillary Refill: Capillary refill takes 2 to 3 seconds.   Neurological:      General: No focal deficit present.      Mental Status: She is alert and oriented to person, place, and time.      Motor: Weakness present.   Psychiatric:         Mood and Affect: Mood normal.         Behavior: Behavior normal.        Vents: N/A  Oxygen Concentration (%): 40 (05/08/23 2332) BIPAP    Lines/Drains/Airways       Drain  Duration                   Urethral Catheter 05/08/23 2150 Silicone 16 Fr. <1 day              Arterial Line  Duration             Arterial Line 05/09/23 0135 Right Radial <1 day              Peripheral Intravenous Line  Duration                  Peripheral IV - Single Lumen 05/08/23 2124 20 G Anterior;Distal;Left Forearm <1 day         Peripheral IV - Single Lumen 05/08/23 2129 20 G Right Antecubital <1 day         Peripheral IV - Single Lumen 05/08/23 2208 20 G Left Antecubital <1 day                    Significant Labs:    CBC/Anemia Profile:  Recent Labs   Lab 05/08/23 2207   WBC 25.68*   HGB 16.3*   HCT 54.6*      MCV 95   RDW 13.1        Chemistries:  Recent Labs   Lab 05/08/23 2207      K 4.3      CO2 15*   BUN 18   CREATININE 1.8*   CALCIUM 9.3   ALBUMIN 3.5   PROT 7.2   BILITOT 0.2   ALKPHOS 121   ALT 41   AST 86*       ABGs:   Recent Labs   Lab 05/09/23 0137   PH 7.199*   PCO2 40.5   HCO3 15.8*   POCSATURATED 97   BE -12     Blood Culture: No results for input(s): LABBLOO in the last 48 hours.  Coagulation:   Recent Labs   Lab 05/09/23 0139   INR 1.4*   APTT 26.3     Lactic Acid:   Recent Labs   Lab 05/08/23 2207   LACTATE 7.0*     POCT Glucose: No results for input(s): POCTGLUCOSE in the last 48 hours.  Troponin:   Recent Labs   Lab 05/08/23 2207 05/09/23 0139   TROPONINI 2.476* 3.118*     Urine Studies:   Recent Labs   Lab 05/09/23 0044   COLORU Yellow   APPEARANCEUA Hazy*   PHUR 7.0   SPECGRAV 1.020   PROTEINUA 3+*   GLUCUA 2+*   KETONESU Negative   BILIRUBINUA Negative   OCCULTUA 3+*   NITRITE Negative   UROBILINOGEN Negative   LEUKOCYTESUR 1+*   RBCUA 7*   WBCUA 24*   BACTERIA Rare   HYALINECASTS 4*     BNP 1173  Beta-hydroxybutyrate 0.1  SARS-CoV-2 RNA negative  Blood and urine cultures pending  Rapid flu A/B ordered, pending    All pertinent labs within the past 24 hours have been reviewed.    Significant Imaging:   I have reviewed all pertinent imaging results/findings within the past 24  hours.  CT Head Without Contrast [[id number]] Resulted: 05/08/23 2328   Order Status: Completed Updated: 05/08/23 2330   Narrative:     EXAMINATION:   CT HEAD WITHOUT CONTRAST     CLINICAL HISTORY:   Mental status change, unknown cause;     TECHNIQUE:   Low dose axial CT images obtained throughout the head without intravenous contrast. Sagittal and coronal reconstructions were performed.     COMPARISON:   None.     FINDINGS:   Atrophy and chronic white matter changes.  Old infarct involving the left posterior periventricular and frontal parietal cortex     No parenchymal mass, hemorrhage, edema or major vascular distribution infarct.     Skull/extracranial contents (limited evaluation): No fracture. Mastoid air cells and paranasal sinuses are essentially clear.    Impression:       Atrophy and chronic white matter changes.  Old infarcts involving the left periventricular and cortical hemisphere     No hemorrhage mass effect or midline shift     All CT scans   are performed using dose optimization techniques including the following: automated exposure control; adjustment of the mA and/or kV; use of iterative reconstruction technique.  Dose modulation was employed for ALARA by means of: Automated exposure control; adjustment of the mA and/or kV according to patient size (this includes techniques or standardized protocols for targeted exams where dose is matched to indication/reason for exam; i.e. extremities or head); and/or use of iterative reconstructive technique.       Electronically signed by: Fish Barnhart   Date: 05/08/2023   Time: 23:28   X-Ray Chest AP Portable [[id number]] Resulted: 05/08/23 2142   Order Status: Completed Updated: 05/08/23 2144   Narrative:     EXAMINATION:   XR CHEST AP PORTABLE     CLINICAL HISTORY:   Shortness of breath     TECHNIQUE:   Single frontal view of the chest was performed.     COMPARISON:   None     FINDINGS:   Moderate interstitial and alveolar opacities consistent with  pulmonary edema.  Atypical infectious process not excluded.  Is trace sub pulmonic pleural effusions may be present.  Non enlarged cardiac silhouette.     Bones are intact.    Impression:       Moderate interstitial and alveolar perihilar opacities may relate to pulmonary edema or infectious process.  Correlate clinically       Electronically signed by: Fish Barnhart   Date: 05/08/2023   Time: 21:42     Assessment/Plan:     Psychiatric  Narcotic dependence  UDS ordered  Per records, hx of chronic back pain   Resume PRN pain medication when appropriate    Pulmonary  COPD exacerbation  Considerable wheezing on exam  Nebulizer treatments ordered  Currently requiring BiPAP  Continue IV steroids  Continue guaifenesin    Pneumonia of both lungs due to infectious organism  Broad-spectrum antibiotics  Follow-up blood cultures, follow up sputum culture  Monitor chest x-ray      Cardiac/Vascular  Dyslipidemia  Statin ordered, if able to tolerate po    NSTEMI (non-ST elevated myocardial infarction)  Initial troponin 2.476, trended upwards to 3.118  Cardiology consulted per ED, reviewed EKG, stated that patient did not need urgent heart catheterization  Heparin drip  Trending troponin levels  She denies chest pain at this time  Check lipid panel, statin dose increased    Renal/  Acute cystitis with hematuria  Broad spectrum antibiotics ordered  Monitor culture reports and adjust antibiotics as indicated    MARCUS (acute kidney injury)  Gentle hydration with IV fluids/bicarb  Monitor labs  Avoid nephrotoxins as able    Metabolic acidosis  Bicarb ordered  Monitor ABG and BMP  Treating infection    ID  * Sepsis with acute hypoxic respiratory failure  Patient with Hypoxic Respiratory failure which is Acute on chronic.  she is on home oxygen at 2, uses PRN LPM. Supplemental oxygen was provided and noted- Oxygen Concentration (%):  [40] 40    .   Signs/symptoms of respiratory failure include- tachypnea, increased work of breathing,  respiratory distress, use of accessory muscles, wheezing and lethargy. Contributing diagnoses includes - CHF, COPD and Pneumonia Labs and images were reviewed. Patient Has recent ABG, which has been reviewed. Will treat underlying causes and adjust management of respiratory failure as follows-     Respiratory failure is multifactorial due to COPD, CHF, pneumonia, MI  Currently requiring BIPAP, wean FIO2 as able  Initial lactic acid 7.0, was given 500 ml fluid bolus cautiously due to elevated BNP/troponin  Trend lactic acid  Does not appear fluid overloaded on exam  Will continue hydration with IV fluids and monitor fluid volume status closely in ICU  Blood, urine, sputum cultures ordered, follow up results  WBC 25.68, has infiltrate on CXR and UTI noted as well  Broad spectrum antibiotics ordered  Reports diarrhea x 2 days, will check stool studies     Endocrine  Hyperglycemia  Glucose in the 300s  No previous history of diabetes per review of records  Serial glucose checks with sliding scale insulin, as she is requiring IV steroids  Check hemoglobin A1c       Full Resuscitation  DVT prophylaxis: SCD, Heparin infusion  Unable to determine surrogate decision maker at this time due to acuity of illness/BIPAP, Barbara Mo listed in chart as other, [phone], she is not present at this time, no answer when I tried calling    Critical Care Time: 74 minutes  Critical secondary to respiratory failure requiring continuous BIPAP, severe sepsis with lactic acidosis     Critical care was time spent personally by me on the following activities: development of treatment plan with patient or surrogate and bedside caregivers, discussions with consultants, evaluation of patient's response to treatment, examination of patient, ordering and performing treatments and interventions, ordering and review of laboratory studies, ordering and review of radiographic studies, pulse oximetry, re-evaluation of patient's condition. This  critical care time did not overlap with that of any other provider or involve time for any procedures.    Case discussed with Dr. Tigist Younger.  Dr. Murphy Ackerman to see patient.     Mayra Stark NP  Critical Care Medicine  [facility] - Emergency Dept.

## 2023-05-09 NOTE — ASSESSMENT & PLAN NOTE
Patient with acute kidney injury likely due to IVVD/dehydration MARCUS is currently improving. Labs reviewed- Renal function/electrolytes with Estimated Creatinine Clearance: 32.1 mL/min (based on SCr of 1.3 mg/dL). according to latest data. Monitor urine output and serial BMP and adjust therapy as needed. Avoid nephrotoxins and renally dose meds for GFR listed above.

## 2023-05-09 NOTE — PROGRESS NOTES
Pharmacokinetic Initial Assessment: IV Vancomycin    Assessment/Plan:    Initiate intravenous vancomycin with loading dose of 750 mg once with subsequent doses when random concentrations are less than 20 mcg/mL  Desired empiric serum trough concentration is 10 to 20 mcg/mL  Draw vancomycin random level on 5/9/23 at 2000.  Pharmacy will continue to follow and monitor vancomycin.      Please contact pharmacy at extension [phone] with any questions regarding this assessment.     Thank you for the consult,   Mandeep Farley       Patient brief summary:  Margie Zamudio is a 67 y.o. female initiated on antimicrobial therapy with IV Vancomycin for treatment of suspected lower respiratory infection, sepsis    Drug Allergies:   Review of patient's allergies indicates:   Allergen Reactions    Morphine Itching    Fentanyl Itching    Hydromorphone Itching       Actual Body Weight:   42.6 kg    Renal Function:   Estimated Creatinine Clearance: 20.7 mL/min (A) (based on SCr of 1.8 mg/dL (H)).,     Dialysis Method (if applicable):  N/A    CBC (last 72 hours):  Recent Labs   Lab Result Units 05/08/23  2207   WBC K/uL 25.68*   Hemoglobin g/dL 16.3*   Hematocrit % 54.6*   Platelets K/uL 384   Gran % % 74.3*   Lymph % % 17.5*   Mono % % 5.2   Eosinophil % % 0.7   Basophil % % 0.7   Differential Method  Automated       Metabolic Panel (last 72 hours):  Recent Labs   Lab Result Units 05/08/23  2207   Sodium mmol/L 139   Potassium mmol/L 4.3   Chloride mmol/L 104   CO2 mmol/L 15*   Glucose mg/dL 364*   BUN mg/dL 18   Creatinine mg/dL 1.8*   Albumin g/dL 3.5   Total Bilirubin mg/dL 0.2   Alkaline Phosphatase U/L 121   AST U/L 86*   ALT U/L 41     Microbiologic Results:  Microbiology Results (last 7 days)       Procedure Component Value Units Date/Time    Blood culture #2 **CANNOT BE ORDERED STAT** [[id number]] Collected: 05/09/23 0042    Order Status: Sent Specimen: Blood from Peripheral, Antecubital, Left Updated: 05/09/23 0043    Blood  culture #1 **CANNOT BE ORDERED STAT** [[id number]] Collected: 05/08/23 2208    Order Status: Sent Specimen: Blood from Peripheral, Antecubital, Left Updated: 05/08/23 2208

## 2023-05-09 NOTE — ASSESSMENT & PLAN NOTE
Us carotid bilateral with significant soft tissue lesion/plaque  mra neck pending  Ct head with chronic changes, old infarcts

## 2023-05-09 NOTE — SUBJECTIVE & OBJECTIVE
Past Medical History:   Diagnosis Date    Chronic pain     COPD (chronic obstructive pulmonary disease)     GERD (gastroesophageal reflux disease)     HTN (hypertension)     Kidney stone     Narcotic dependence     Stroke        Past Surgical History:   Procedure Laterality Date    CAROTID ENDARTERECTOMY      CHOLECYSTECTOMY      FACIAL RECONSTRUCTION SURGERY      HYSTERECTOMY      TONSILLECTOMY         Review of patient's allergies indicates:   Allergen Reactions    Morphine Itching    Fentanyl Itching    Hydromorphone Itching       No current facility-administered medications on file prior to encounter.     Current Outpatient Medications on File Prior to Encounter   Medication Sig    albuterol (PROVENTIL HFA) 90 mcg/actuation inhaler Proventil HFA 90 mcg/actuation aerosol inhaler   Inhale 2 puffs every 4 hours by inhalation route as needed.    albuterol (PROVENTIL) 2.5 mg /3 mL (0.083 %) nebulizer solution 3 mLs.    ALPRAZolam (XANAX) 0.25 MG tablet Xanax 0.25 mg tablet   Take 1 tablet 3 times a day by oral route.    butalbitaL-acetaminophen  mg Tab butalbital 50 mg-acetaminophen 325 mg tablet   Take 1 tablet every 4 hours by oral route.    clopidogreL (PLAVIX) 75 mg tablet clopidogrel 75 mg tablet   TAKE 1 TABLET BY MOUTH ONCE DAILY    DULoxetine (CYMBALTA) 60 MG capsule duloxetine 60 mg capsule,delayed release   Take 1 capsule twice a day by oral route.    folic acid (FOLVITE) 1 MG tablet folic acid 1 mg tablet   TAKE 1 TABLET BY MOUTH ONCE DAILY    gabapentin (NEURONTIN) 100 MG capsule gabapentin 100 mg capsule   Take 1 capsule 3 times a day by oral route.    glycopyrrol-nebulizer-accessor (LONHALA MAGNAIR STARTER) 25 mcg/mL Nebu 1 mL.    guaiFENesin (MUCINEX) 600 mg 12 hr tablet Mucinex 600 mg tablet, extended release   Take 1 tablet every 12 hours by oral route for 7 days.    hydrOXYzine pamoate (VISTARIL) 25 MG Cap hydroxyzine pamoate 25 mg capsule   TAKE ONE CAPSULE BY MOUTH FOUR TIMES DAILY AS NEEDED     lisinopriL 10 MG tablet lisinopril 10 mg tablet   Take 1 tablet every day by oral route.    pantoprazole (PROTONIX) 40 MG tablet pantoprazole 40 mg tablet,delayed release   Take 1 tablet every day by oral route.    pravastatin (PRAVACHOL) 10 MG tablet pravastatin 10 mg tablet   Take 1 tablet(s) every day by oral route.    traZODone (DESYREL) 100 MG tablet trazodone 100 mg tablet   Take 2 tablets every day by oral route at bedtime.     Family History       Problem Relation (Age of Onset)    Heart disease Mother, Father    Kidney cancer Brother          Tobacco Use    Smoking status: Every Day     Packs/day: 0.50     Types: Cigarettes    Smokeless tobacco: Never   Substance and Sexual Activity    Alcohol use: Never    Drug use: Never    Sexual activity: Not on file     Review of Systems   Constitutional:  Positive for activity change and fatigue. Negative for appetite change and fever.   Respiratory:  Positive for cough and shortness of breath.    Cardiovascular:  Negative for chest pain.   Gastrointestinal:  Positive for diarrhea (improved). Negative for abdominal pain, nausea and vomiting.   Genitourinary:  Positive for difficulty urinating (from weakness).   Musculoskeletal:  Positive for arthralgias, back pain and myalgias.   Neurological:  Positive for weakness.   Psychiatric/Behavioral:  Positive for agitation and decreased concentration. The patient is nervous/anxious.    Objective:     Vital Signs (Most Recent):  Temp: 97.5 °F (36.4 °C) (05/09/23 1504)  Pulse: 91 (05/09/23 1245)  Resp: (!) 25 (05/09/23 1245)  BP: 102/69 (05/09/23 1300)  SpO2: 96 % (05/09/23 1245) Vital Signs (24h Range):  Temp:  [97.5 °F (36.4 °C)-101.1 °F (38.4 °C)] 97.5 °F (36.4 °C)  Pulse:  [] 91  Resp:  [19-42] 25  SpO2:  [83 %-100 %] 96 %  BP: ()/() 102/69  Arterial Line BP: ()/(56-76) 107/66     Weight: 49 kg (108 lb)  Body mass index is 20.41 kg/m².     Physical Exam  Vitals and nursing note reviewed.    Constitutional:       General: She is not in acute distress.     Appearance: She is underweight. She is ill-appearing. She is not toxic-appearing.   HENT:      Head: Normocephalic and atraumatic.   Cardiovascular:      Rate and Rhythm: Normal rate.   Pulmonary:      Effort: Tachypnea and respiratory distress present.   Abdominal:      Palpations: Abdomen is soft.      Tenderness: There is no abdominal tenderness.   Genitourinary:     Comments: alvarez  Musculoskeletal:      Right lower leg: No edema.      Left lower leg: No edema.   Skin:     General: Skin is warm and dry.      Coloration: Skin is pale.   Neurological:      Mental Status: She is alert. Mental status is at baseline.      Motor: Weakness present.   Psychiatric:         Behavior: Behavior is cooperative.        Significant Labs: All pertinent labs within the past 24 hours have been reviewed.  ABGs:   Recent Labs   Lab 05/08/23 2150 05/09/23  0137 05/09/23  0600   PH 7.126* 7.199* 7.381   PCO2 46.7* 40.5 38.6   HCO3 15.4* 15.8* 22.9*   POCSATURATED 96 97 97   BE -14 -12 -2   PO2 112* 108* 89     Blood Culture:   Recent Labs   Lab 05/08/23 2208   LABBLOO No Growth to date     CBC:   Recent Labs   Lab 05/08/23 2207 05/09/23  0405   WBC 25.68* 19.98*   HGB 16.3* 13.8   HCT 54.6* 44.0    264     CMP:   Recent Labs   Lab 05/08/23 2207 05/09/23  0405    143   K 4.3 3.2*    107   CO2 15* 18*   * 204*   BUN 18 22   CREATININE 1.8* 1.3   CALCIUM 9.3 8.1*   PROT 7.2  --    ALBUMIN 3.5  --    BILITOT 0.2  --    ALKPHOS 121  --    AST 86*  --    ALT 41  --    ANIONGAP 20* 18*     Lactic Acid:   Recent Labs   Lab 05/08/23 2207 05/09/23 0405 05/09/23  0817   LACTATE 7.0* 6.0* 2.7*     Troponin:   Recent Labs   Lab 05/09/23 0405 05/09/23  0817 05/09/23  1215   TROPONINI 3.998* 4.052* 3.526*     Urine Studies:   Recent Labs   Lab 05/09/23  0044   COLORU Yellow   APPEARANCEUA Hazy*   PHUR 7.0   SPECGRAV 1.020   PROTEINUA 3+*   GLUCUA 2+*    KETONESU Negative   BILIRUBINUA Negative   OCCULTUA 3+*   NITRITE Negative   UROBILINOGEN Negative   LEUKOCYTESUR 1+*   RBCUA 7*   WBCUA 24*   BACTERIA Rare   HYALINECASTS 4*       Significant Imaging: I have reviewed all pertinent imaging results/findings within the past 24 hours.  CXR: I have reviewed all pertinent results/findings within the past 24 hours and my personal findings are:  interstitial opacities

## 2023-05-09 NOTE — HPI
Patient is a 67-year-old female with past medical history of hypertension, COPD, GERD, hypertension, stroke, kidney stones, chronic pain, narcotic dependence, anxiety, depression, insomnia, and tobacco abuse who presented to the ED per ambulance personnel due to shortness of breath.  Per notes, EMS reported oxygen saturation in the 60s.  She arrived to the ED with CPAP in place, was noted to be breathless, had difficulty speaking.  She was given Solu-Medrol 125 mg and DuoNeb x3 while in route to the hospital.  Information is limited due to respiratory distress/BiPAP.  Her symptoms started several hours prior to arrival in ED, were gradually worsening, no mitigating or exacerbating factors reported.  She also reports nausea, vomiting, diarrhea for the past 2 days.  She states productive cough, white sputum for about 1 month, fever, difficulty speaking, she denies chest pain.  Initial EKG concerning for ST elevation in V4, sinus tach, heart rate 130, bundle-branch block.  Cardiology was urgently consulted who reviewed EKG inpatient did not require cath lab activation, due to no STEMI criteria.  CT head was done which showed no acute abnormality.  Lab workup significant for troponin 2.4, BNP 1000, WBC 68338, lactic acid 7.0, positive UTI, anion gap 20, CO2 15, creatinine 1.8, glucose 364, ABG -pH 7.1-6, bicarb 15.8.  Imaging concerning for infection versus edema. She was given 500 mL fluid bolus, acetaminophen, nebulizer treatments, Rocephin, Zithromax, and heparin drip is ordered.

## 2023-05-09 NOTE — ASSESSMENT & PLAN NOTE
Continue copdx treatment protocol with intravenous antibiotic(s), systemic steroids, breathing treatments, and smoking cessation counseling  Follow up with outpatient primary pulmonologistvincent

## 2023-05-09 NOTE — PLAN OF CARE
[facility] - Intensive Care (Hospital)  Initial Discharge Assessment       Primary Care Provider: No primary care provider on file.    Admission Diagnosis: SOB (shortness of breath) [R06.02]  Elevated troponin [R77.8]  NSTEMI (non-ST elevated myocardial infarction) [I21.4]  Sepsis with acute hypoxic respiratory failure [A41.9, R65.20, J96.01]    Admission Date: 5/8/2023  Expected Discharge Date:     Discharge Barriers Identified: None    Payor: MEDICARE / Plan: MEDICARE PART A & B / Product Type: Government /     Extended Emergency Contact Information  Primary Emergency Contact: Lynn Ballard  Home Phone: [phone]  Relation: Other  Secondary Emergency Contact: Guanaco Quintero  Home Phone: [phone]  Relation: Son    Discharge Plan A: Home with family  Discharge Plan B: Home Health    No Pharmacies Listed    Initial Assessment (most recent)       Adult Discharge Assessment - 05/09/23 1335          Discharge Assessment    Assessment Type Discharge Planning Assessment     Confirmed/corrected address, phone number and insurance --    updated    Source of Information patient     Communicated KARLA with patient/caregiver Date not available/Unable to determine     Reason For Admission Sepsis     People in Home child(trini), adult;grandchild(trini)     Facility Arrived From: home     Do you expect to return to your current living situation? Yes     Do you have help at home or someone to help you manage your care at home? Yes     Who are your caregiver(s) and their phone number(s)? Lynn, family friend and marin Fonseca     Prior to hospitilization cognitive status: Alert/Oriented     Current cognitive status: Not Oriented to Time     Walking or Climbing Stairs ambulation difficulty, requires equipment     Mobility Management rollator, cane     Home Accessibility wheelchair accessible     Home Layout Able to live on 1st floor     Equipment Currently Used at Home oxygen;rollator;cane, straight     Readmission within 30  days? No     Patient currently being followed by outpatient case management? No     Do you currently have service(s) that help you manage your care at home? No     Do you take prescription medications? Yes     Do you have prescription coverage? Yes     Coverage MCR     Do you have any problems affording any of your prescribed medications? No     Is the patient taking medications as prescribed? yes     Who is going to help you get home at discharge? Family     How do you get to doctors appointments? family or friend will provide     Are you on dialysis? No     Do you take coumadin? No     Discharge Plan A Home with family     Discharge Plan B Home Health     DME Needed Upon Discharge  none     Discharge Plan discussed with: Patient     Discharge Barriers Identified None                   Anticipated DC dispo: home vs home health   Prior Level of Function: independent with ADLs   PCP: Luis Mcmullen MD     Comments:  CM met with patient at bedside to introduce role and discuss discharge planning. Patient lives with her Raymundo funes, and his son. Family will be help at home and can provide transport at time of discharge. CM discharge needs depends on hospital progress. CM will continue following to assist with other needs.

## 2023-05-09 NOTE — CONSULTS
[facility] - Intensive Care ([facility])  Hospital Medicine  Consult Note    Patient Name: Margie Zamudio  MRN: [id number]  Admission Date: 5/8/2023  Hospital Length of Stay: 0 days  Attending Physician: Murphy Ackerman MD   Primary Care Provider: Luis Mcmullen MD           Patient information was obtained from patient, ER records and primary team.     Consults  Subjective:     Principal Problem: Sepsis with acute hypoxic respiratory failure    Chief Complaint:   Chief Complaint   Patient presents with    Shortness of Breath     Pt c/o fo SOB. O2 in the 60s on fire arrival. Arrives on CPAP, breathless and difficulty speaking. 125 of solumedrol and duo nebs x's 3 given in route.         HPI: 67F PMH hypertension, hyperlipidemia, cva, copd, congestive heart failure, chronic pain, narcotic dependence, gerd, anxiety and depression, admitted for copdx. Associated with nausea, vomiting, cough, and dyspnea. Denies chest pain. Received solumedrol and duonebs en route.    Patient febrile, tachycardic, and tachypneic. Initial workup revealed leukocytosis, acute kidney injury, hyperglycemia, elevated ast. Lactate elevated. Cardiac markers also elevated. Uds positive for marijuana. Urinalysis with rare bacteria. Chest x-ray with interstitial opacities. Ct head wo contrast with atrophy, chronic changes and old infarcts.    Blood culture negative growth to date.     Cardiology consulted and recommended medical management. Echocardiogram reviewed with 30% ejection fraction.  Us carotid bilateral with significant tissue lesion/plaque with prior [facility] carotid endarterectomy.    Patient initially required continuous nippv warranting icu admission. Patient weaned to supplemental oxygen with systemic steroids and intravenous antibiotic(s).    Hospital medicine consulted for transfer of care.     Patient reports improvement in symptoms. States inciting event was panic attack from stress. Denies dyspnea, chest pain. +Hunger. Wanting to go  home. Primary pulmonologist is Dr. Beltre. She reports having ran out of inhaler medication(s).         Past Medical History:   Diagnosis Date    Chronic pain     COPD (chronic obstructive pulmonary disease)     GERD (gastroesophageal reflux disease)     HTN (hypertension)     Kidney stone     Narcotic dependence     Stroke        Past Surgical History:   Procedure Laterality Date    CAROTID ENDARTERECTOMY      CHOLECYSTECTOMY      FACIAL RECONSTRUCTION SURGERY      HYSTERECTOMY      TONSILLECTOMY         Review of patient's allergies indicates:   Allergen Reactions    Morphine Itching    Fentanyl Itching    Hydromorphone Itching       No current facility-administered medications on file prior to encounter.     Current Outpatient Medications on File Prior to Encounter   Medication Sig    albuterol (PROVENTIL HFA) 90 mcg/actuation inhaler Proventil HFA 90 mcg/actuation aerosol inhaler   Inhale 2 puffs every 4 hours by inhalation route as needed.    albuterol (PROVENTIL) 2.5 mg /3 mL (0.083 %) nebulizer solution 3 mLs.    ALPRAZolam (XANAX) 0.25 MG tablet Xanax 0.25 mg tablet   Take 1 tablet 3 times a day by oral route.    butalbitaL-acetaminophen  mg Tab butalbital 50 mg-acetaminophen 325 mg tablet   Take 1 tablet every 4 hours by oral route.    clopidogreL (PLAVIX) 75 mg tablet clopidogrel 75 mg tablet   TAKE 1 TABLET BY MOUTH ONCE DAILY    DULoxetine (CYMBALTA) 60 MG capsule duloxetine 60 mg capsule,delayed release   Take 1 capsule twice a day by oral route.    folic acid (FOLVITE) 1 MG tablet folic acid 1 mg tablet   TAKE 1 TABLET BY MOUTH ONCE DAILY    gabapentin (NEURONTIN) 100 MG capsule gabapentin 100 mg capsule   Take 1 capsule 3 times a day by oral route.    glycopyrrol-nebulizer-accessor (LONHALA MAGNAIR STARTER) 25 mcg/mL Nebu 1 mL.    guaiFENesin (MUCINEX) 600 mg 12 hr tablet Mucinex 600 mg tablet, extended release   Take 1 tablet every 12 hours by oral route for 7 days.     hydrOXYzine pamoate (VISTARIL) 25 MG Cap hydroxyzine pamoate 25 mg capsule   TAKE ONE CAPSULE BY MOUTH FOUR TIMES DAILY AS NEEDED    lisinopriL 10 MG tablet lisinopril 10 mg tablet   Take 1 tablet every day by oral route.    pantoprazole (PROTONIX) 40 MG tablet pantoprazole 40 mg tablet,delayed release   Take 1 tablet every day by oral route.    pravastatin (PRAVACHOL) 10 MG tablet pravastatin 10 mg tablet   Take 1 tablet(s) every day by oral route.    traZODone (DESYREL) 100 MG tablet trazodone 100 mg tablet   Take 2 tablets every day by oral route at bedtime.     Family History       Problem Relation (Age of Onset)    Heart disease Mother, Father    Kidney cancer Brother          Tobacco Use    Smoking status: Every Day     Packs/day: 0.50     Types: Cigarettes    Smokeless tobacco: Never   Substance and Sexual Activity    Alcohol use: Never    Drug use: Never    Sexual activity: Not on file     Review of Systems   Constitutional:  Positive for activity change and fatigue. Negative for appetite change and fever.   Respiratory:  Positive for cough and shortness of breath.    Cardiovascular:  Negative for chest pain.   Gastrointestinal:  Positive for diarrhea (improved). Negative for abdominal pain, nausea and vomiting.   Genitourinary:  Positive for difficulty urinating (from weakness).   Musculoskeletal:  Positive for arthralgias, back pain and myalgias.   Neurological:  Positive for weakness.   Psychiatric/Behavioral:  Positive for agitation and decreased concentration. The patient is nervous/anxious.    Objective:     Vital Signs (Most Recent):  Temp: 97.5 °F (36.4 °C) (05/09/23 1504)  Pulse: 91 (05/09/23 1245)  Resp: (!) 25 (05/09/23 1245)  BP: 102/69 (05/09/23 1300)  SpO2: 96 % (05/09/23 1245) Vital Signs (24h Range):  Temp:  [97.5 °F (36.4 °C)-101.1 °F (38.4 °C)] 97.5 °F (36.4 °C)  Pulse:  [] 91  Resp:  [19-42] 25  SpO2:  [83 %-100 %] 96 %  BP: ()/() 102/69  Arterial Line BP:  ()/(56-76) 107/66     Weight: 49 kg (108 lb)  Body mass index is 20.41 kg/m².     Physical Exam  Vitals and nursing note reviewed.   Constitutional:       General: She is not in acute distress.     Appearance: She is underweight. She is ill-appearing. She is not toxic-appearing.   HENT:      Head: Normocephalic and atraumatic.   Cardiovascular:      Rate and Rhythm: Normal rate.   Pulmonary:      Effort: Tachypnea and respiratory distress present.   Abdominal:      Palpations: Abdomen is soft.      Tenderness: There is no abdominal tenderness.   Genitourinary:     Comments: kim  Musculoskeletal:      Right lower leg: No edema.      Left lower leg: No edema.   Skin:     General: Skin is warm and dry.      Coloration: Skin is pale.   Neurological:      Mental Status: She is alert. Mental status is at baseline.      Motor: Weakness present.   Psychiatric:         Behavior: Behavior is cooperative.        Significant Labs: All pertinent labs within the past 24 hours have been reviewed.  ABGs:   Recent Labs   Lab 05/08/23 2150 05/09/23  0137 05/09/23  0600   PH 7.126* 7.199* 7.381   PCO2 46.7* 40.5 38.6   HCO3 15.4* 15.8* 22.9*   POCSATURATED 96 97 97   BE -14 -12 -2   PO2 112* 108* 89     Blood Culture:   Recent Labs   Lab 05/08/23 2208   LABBLOO No Growth to date     CBC:   Recent Labs   Lab 05/08/23 2207 05/09/23  0405   WBC 25.68* 19.98*   HGB 16.3* 13.8   HCT 54.6* 44.0    264     CMP:   Recent Labs   Lab 05/08/23 2207 05/09/23  0405    143   K 4.3 3.2*    107   CO2 15* 18*   * 204*   BUN 18 22   CREATININE 1.8* 1.3   CALCIUM 9.3 8.1*   PROT 7.2  --    ALBUMIN 3.5  --    BILITOT 0.2  --    ALKPHOS 121  --    AST 86*  --    ALT 41  --    ANIONGAP 20* 18*     Lactic Acid:   Recent Labs   Lab 05/08/23 2207 05/09/23  0405 05/09/23  0817   LACTATE 7.0* 6.0* 2.7*     Troponin:   Recent Labs   Lab 05/09/23  0405 05/09/23  0817 05/09/23  1215   TROPONINI 3.998* 4.052* 3.526*     Urine  Studies:   Recent Labs   Lab 05/09/23  0044   COLORU Yellow   APPEARANCEUA Hazy*   PHUR 7.0   SPECGRAV 1.020   PROTEINUA 3+*   GLUCUA 2+*   KETONESU Negative   BILIRUBINUA Negative   OCCULTUA 3+*   NITRITE Negative   UROBILINOGEN Negative   LEUKOCYTESUR 1+*   RBCUA 7*   WBCUA 24*   BACTERIA Rare   HYALINECASTS 4*       Significant Imaging: I have reviewed all pertinent imaging results/findings within the past 24 hours.  CXR: I have reviewed all pertinent results/findings within the past 24 hours and my personal findings are:  interstitial opacities    Assessment/Plan:     * Sepsis with acute hypoxic respiratory failure  Patient with Hypoxic Respiratory failure which is Acute on chronic.  she is on home oxygen at 2 LPM. Supplemental oxygen was provided and noted- Oxygen Concentration (%):  [28-40] 28    .   Signs/symptoms of respiratory failure include- tachypnea, increased work of breathing and respiratory distress. Contributing diagnoses includes - CHF and COPD Labs and images were reviewed. Patient Has recent ABG, which has been reviewed. Will treat underlying causes and adjust management of respiratory failure as follows- continue supplemental oxygen, intravenous antibiotic(s), systemic steroids, breathing treatments    History of carotid endarterectomy  Us carotid bilateral with significant soft tissue lesion/plaque  mra neck pending  Ct head with chronic changes, old infarcts    Dyslipidemia  Resume home statin      Narcotic dependence  Hold for respiratory distress  Add back home medication(s) as indicated to avoid withdrawal      Acute cystitis with hematuria  Urinalysis unremarkable  Continue intravenous cefepime for copdx  Deescalate as clinically improving      MARCUS (acute kidney injury)  Patient with acute kidney injury likely due to IVVD/dehydration MARCUS is currently improving. Labs reviewed- Renal function/electrolytes with Estimated Creatinine Clearance: 32.1 mL/min (based on SCr of 1.3 mg/dL). according  to latest data. Monitor urine output and serial BMP and adjust therapy as needed. Avoid nephrotoxins and renally dose meds for GFR listed above.       Metabolic acidosis  Sodium bicarb      NSTEMI (non-ST elevated myocardial infarction)  Cardiology following  Heparin infusion  May need cath once medically stable      Hyperglycemia  In setting of systemic steroids  Monitor  ssi as needed      COPD exacerbation  Continue copdx treatment protocol with intravenous antibiotic(s), systemic steroids, breathing treatments, and smoking cessation counseling  Follow up with outpatient primary pulmonologist, vincent    Pneumonia of both lungs due to infectious organism  Intravenous antibiotic(s)   Procalcitonin pending    VTE Risk Mitigation (From admission, onward)         Ordered     Place sequential compression device  Until discontinued         05/09/23 0152     IP VTE LOW RISK PATIENT  Once         05/09/23 0152     heparin 25,000 units in dextrose 5% (100 units/ml) IV bolus from bag - ADDITIONAL PRN BOLUS - 60 units/kg (max bolus 4000 units)  As needed (PRN)        Question:  Heparin Infusion Adjustment (DO NOT MODIFY ANSWER)  Answer:  \[email or web address]\[email or web address]\Images\Pharmacy\HeparinInfusions\heparin LOW INTENSITY nomogram for [facility] [id number]    05/08/23 2350     heparin 25,000 units in dextrose 5% (100 units/ml) IV bolus from bag - ADDITIONAL PRN BOLUS - 30 units/kg (max bolus 4000 units)  As needed (PRN)        Question:  Heparin Infusion Adjustment (DO NOT MODIFY ANSWER)  Answer:  \[email or web address]\Images\Pharmacy\HeparinInfusions\heparin LOW INTENSITY nomogram for [facility] [id number]    05/08/23 2350     heparin 25,000 units in dextrose 5% 250 mL (100 units/mL) infusion LOW INTENSITY nomogram - [facility]  Continuous        Question Answer Comment   Heparin Infusion Adjustment (DO NOT MODIFY ANSWER) [email or web address]\Images\Pharmacy\HeparinInfusions\heparin LOW INTENSITY nomogram for [facility] [id number]    Begin at (in units/kg/hr) 12         05/08/23 2661                Critical care time spent on the evaluation and treatment of severe organ dysfunction, review of pertinent labs and imaging studies, discussions with consulting providers and discussions with patient/family: 31 minutes.    Thank you for your consult. I will follow-up with patient. Please contact us if you have any additional questions.    Renetta Alcantar MD  Department of Hospital Medicine   [facility] - Intensive Care ([facility])

## 2023-05-09 NOTE — ASSESSMENT & PLAN NOTE
Patient with Hypoxic Respiratory failure which is Acute on chronic.  she is on home oxygen at 2, uses PRN LPM. Supplemental oxygen was provided and noted- Oxygen Concentration (%):  [40] 40    .   Signs/symptoms of respiratory failure include- tachypnea, increased work of breathing, respiratory distress, use of accessory muscles, wheezing and lethargy. Contributing diagnoses includes - CHF, COPD and Pneumonia Labs and images were reviewed. Patient Has recent ABG, which has been reviewed. Will treat underlying causes and adjust management of respiratory failure as follows-     Respiratory failure is multifactorial due to COPD, CHF, pneumonia, MI  Currently requiring BIPAP, wean FIO2 as able  Initial lactic acid 7.0, was given 500 ml fluid bolus cautiously due to elevated BNP/troponin  Trend lactic acid  Does not appear fluid overloaded on exam  Will continue hydration with IV fluids and monitor fluid volume status closely in ICU  Blood, urine, sputum cultures ordered, follow up results  WBC 25.68, has infiltrate on CXR and UTI noted as well  Broad spectrum antibiotics ordered  Reports diarrhea x 2 days, will check stool studies

## 2023-05-09 NOTE — HPI
67-year-old female with PMHx for hypertension, COPD, stroke, chronic pain, narcotic dependence, anxiety, depression, and tobacco abuse admitted for pneumonia/sepsis with hypxemia.  EKG shows sinus tachycardia, LBBB isolated lead V4 with q wave and nonspecific ST elevation. Troponin is elevated at around 2. Sx are SOB, no CP.

## 2023-05-09 NOTE — ASSESSMENT & PLAN NOTE
Patient with Hypoxic Respiratory failure which is Acute on chronic.  she is on home oxygen at 2 LPM. Supplemental oxygen was provided and noted- Oxygen Concentration (%):  [28-40] 28    .   Signs/symptoms of respiratory failure include- tachypnea, increased work of breathing and respiratory distress. Contributing diagnoses includes - CHF and COPD Labs and images were reviewed. Patient Has recent ABG, which has been reviewed. Will treat underlying causes and adjust management of respiratory failure as follows- continue supplemental oxygen, intravenous antibiotic(s), systemic steroids, breathing treatments

## 2023-05-09 NOTE — ASSESSMENT & PLAN NOTE
Urinalysis unremarkable  Continue intravenous cefepime for copdx  Deescalate as clinically improving

## 2023-05-09 NOTE — SUBJECTIVE & OBJECTIVE
Past Medical History:   Diagnosis Date    Chronic pain     COPD (chronic obstructive pulmonary disease)     GERD (gastroesophageal reflux disease)     HTN (hypertension)     Kidney stone     Narcotic dependence     Stroke        Past Surgical History:   Procedure Laterality Date    CAROTID ENDARTERECTOMY      CHOLECYSTECTOMY      FACIAL RECONSTRUCTION SURGERY      HYSTERECTOMY      TONSILLECTOMY         Review of patient's allergies indicates:   Allergen Reactions    Morphine Itching    Fentanyl Itching    Hydromorphone Itching       No current facility-administered medications on file prior to encounter.     Current Outpatient Medications on File Prior to Encounter   Medication Sig    albuterol (PROVENTIL HFA) 90 mcg/actuation inhaler Proventil HFA 90 mcg/actuation aerosol inhaler   Inhale 2 puffs every 4 hours by inhalation route as needed.    albuterol (PROVENTIL) 2.5 mg /3 mL (0.083 %) nebulizer solution 3 mLs.    ALPRAZolam (XANAX) 0.25 MG tablet Xanax 0.25 mg tablet   Take 1 tablet 3 times a day by oral route.    butalbitaL-acetaminophen  mg Tab butalbital 50 mg-acetaminophen 325 mg tablet   Take 1 tablet every 4 hours by oral route.    clopidogreL (PLAVIX) 75 mg tablet clopidogrel 75 mg tablet   TAKE 1 TABLET BY MOUTH ONCE DAILY    DULoxetine (CYMBALTA) 60 MG capsule duloxetine 60 mg capsule,delayed release   Take 1 capsule twice a day by oral route.    folic acid (FOLVITE) 1 MG tablet folic acid 1 mg tablet   TAKE 1 TABLET BY MOUTH ONCE DAILY    gabapentin (NEURONTIN) 100 MG capsule gabapentin 100 mg capsule   Take 1 capsule 3 times a day by oral route.    glycopyrrol-nebulizer-accessor (LONHALA MAGNAIR STARTER) 25 mcg/mL Nebu 1 mL.    guaiFENesin (MUCINEX) 600 mg 12 hr tablet Mucinex 600 mg tablet, extended release   Take 1 tablet every 12 hours by oral route for 7 days.    hydrOXYzine pamoate (VISTARIL) 25 MG Cap hydroxyzine pamoate 25 mg capsule   TAKE ONE CAPSULE BY MOUTH FOUR TIMES DAILY AS NEEDED     lisinopriL 10 MG tablet lisinopril 10 mg tablet   Take 1 tablet every day by oral route.    pantoprazole (PROTONIX) 40 MG tablet pantoprazole 40 mg tablet,delayed release   Take 1 tablet every day by oral route.    pravastatin (PRAVACHOL) 10 MG tablet pravastatin 10 mg tablet   Take 1 tablet(s) every day by oral route.    traZODone (DESYREL) 100 MG tablet trazodone 100 mg tablet   Take 2 tablets every day by oral route at bedtime.     Family History       Problem Relation (Age of Onset)    Heart disease Mother, Father    Kidney cancer Brother          Tobacco Use    Smoking status: Every Day     Packs/day: 0.50     Types: Cigarettes    Smokeless tobacco: Never   Substance and Sexual Activity    Alcohol use: Never    Drug use: Never    Sexual activity: Not on file     Review of Systems   Constitutional: Negative. Negative for weight gain.   HENT: Negative.     Eyes: Negative.    Cardiovascular: Negative.  Negative for chest pain, leg swelling and palpitations.   Respiratory: Negative.  Negative for shortness of breath.    Endocrine: Negative.    Hematologic/Lymphatic: Negative.    Skin: Negative.    Musculoskeletal:  Negative for muscle weakness.   Gastrointestinal: Negative.    Genitourinary: Negative.    Neurological: Negative.  Negative for dizziness.   Psychiatric/Behavioral: Negative.     Allergic/Immunologic: Negative.    All other systems reviewed and are negative.  Objective:     Vital Signs (Most Recent):  Temp: 97.7 °F (36.5 °C) (05/09/23 0705)  Pulse: 99 (05/09/23 0806)  Resp: (Abnormal) 23 (05/09/23 0806)  BP: 106/75 (05/09/23 0805)  SpO2: (Abnormal) 94 % (05/09/23 0806) Vital Signs (24h Range):  Temp:  [97.7 °F (36.5 °C)-101.1 °F (38.4 °C)] 97.7 °F (36.5 °C)  Pulse:  [] 99  Resp:  [20-42] 23  SpO2:  [83 %-100 %] 94 %  BP: ()/() 106/75  Arterial Line BP: (103-123)/(62-76) 116/69     Weight: 49 kg (108 lb 0.4 oz)  Body mass index is 20.41 kg/m².    SpO2: (Abnormal) 94 %          Intake/Output Summary (Last 24 hours) at 5/9/2023 0836  Last data filed at 5/9/2023 0800  Gross per 24 hour   Intake 2623.31 ml   Output 38 ml   Net 2585.31 ml       Lines/Drains/Airways       Drain       Name Duration         Urethral Catheter 05/08/23 2150 Silicone 16 Fr. <1 day              Arterial Line       Name Duration    Arterial Line 05/09/23 0135 Right Radial <1 day              Peripheral Intravenous Line       Name Duration         Peripheral IV - Single Lumen 05/08/23 2124 20 G Anterior;Distal;Left Forearm <1 day         Peripheral IV - Single Lumen 05/08/23 2129 20 G Right Antecubital <1 day         Peripheral IV - Single Lumen 05/08/23 2208 20 G Left Antecubital <1 day                     Physical Exam  Vitals and nursing note reviewed.   Constitutional:       Appearance: She is well-developed.   HENT:      Head: Normocephalic and atraumatic.   Eyes:      Conjunctiva/sclera: Conjunctivae normal.      Pupils: Pupils are equal, round, and reactive to light.   Cardiovascular:      Rate and Rhythm: Normal rate and regular rhythm.      Pulses: Intact distal pulses.      Heart sounds: Normal heart sounds.   Pulmonary:      Effort: Pulmonary effort is normal.      Breath sounds: Rhonchi present.   Abdominal:      General: Bowel sounds are normal.      Palpations: Abdomen is soft.   Musculoskeletal:         General: Normal range of motion.      Cervical back: Normal range of motion and neck supple.   Skin:     General: Skin is warm and dry.   Neurological:      Mental Status: She is alert and oriented to person, place, and time.        Significant Labs: All pertinent lab results from the last 24 hours have been reviewed.    Significant Imaging: X-Ray: CXR: X-Ray Chest 1 View (CXR): No results found for this visit on 05/08/23.

## 2023-05-09 NOTE — CONSULTS
[facility] - Intensive Care ([facility])  Cardiology  Consult Note    Patient Name: Margie Zamudio  MRN: [id number]  Admission Date: 5/8/2023  Hospital Length of Stay: 0 days  Code Status: Full Code   Attending Provider: Murphy Ackerman MD   Consulting Provider: Doyle Diallo MD  Primary Care Physician: No primary care provider on file.  Principal Problem:Sepsis with acute hypoxic respiratory failure    Patient information was obtained from patient and ER records.     Inpatient consult to Cardiology  Consult performed by: Doyle Diallo MD  Consult ordered by: Mayra Stark NP      Subjective:     Chief Complaint:  sob     HPI:   67-year-old female with PMHx for hypertension, COPD, stroke, chronic pain, narcotic dependence, anxiety, depression, and tobacco abuse admitted for pneumonia/sepsis with hypxemia.  EKG shows sinus tachycardia, LBBB isolated lead V4 with q wave and nonspecific ST elevation. Troponin is elevated at around 2. Sx are SOB, no CP.      Past Medical History:   Diagnosis Date    Chronic pain     COPD (chronic obstructive pulmonary disease)     GERD (gastroesophageal reflux disease)     HTN (hypertension)     Kidney stone     Narcotic dependence     Stroke        Past Surgical History:   Procedure Laterality Date    CAROTID ENDARTERECTOMY      CHOLECYSTECTOMY      FACIAL RECONSTRUCTION SURGERY      HYSTERECTOMY      TONSILLECTOMY         Review of patient's allergies indicates:   Allergen Reactions    Morphine Itching    Fentanyl Itching    Hydromorphone Itching       No current facility-administered medications on file prior to encounter.     Current Outpatient Medications on File Prior to Encounter   Medication Sig    albuterol (PROVENTIL HFA) 90 mcg/actuation inhaler Proventil HFA 90 mcg/actuation aerosol inhaler   Inhale 2 puffs every 4 hours by inhalation route as needed.    albuterol (PROVENTIL) 2.5 mg /3 mL (0.083 %) nebulizer solution 3 mLs.    ALPRAZolam (XANAX) 0.25 MG tablet  Xanax 0.25 mg tablet   Take 1 tablet 3 times a day by oral route.    butalbitaL-acetaminophen  mg Tab butalbital 50 mg-acetaminophen 325 mg tablet   Take 1 tablet every 4 hours by oral route.    clopidogreL (PLAVIX) 75 mg tablet clopidogrel 75 mg tablet   TAKE 1 TABLET BY MOUTH ONCE DAILY    DULoxetine (CYMBALTA) 60 MG capsule duloxetine 60 mg capsule,delayed release   Take 1 capsule twice a day by oral route.    folic acid (FOLVITE) 1 MG tablet folic acid 1 mg tablet   TAKE 1 TABLET BY MOUTH ONCE DAILY    gabapentin (NEURONTIN) 100 MG capsule gabapentin 100 mg capsule   Take 1 capsule 3 times a day by oral route.    glycopyrrol-nebulizer-accessor (LONHALA MAGNAIR STARTER) 25 mcg/mL Nebu 1 mL.    guaiFENesin (MUCINEX) 600 mg 12 hr tablet Mucinex 600 mg tablet, extended release   Take 1 tablet every 12 hours by oral route for 7 days.    hydrOXYzine pamoate (VISTARIL) 25 MG Cap hydroxyzine pamoate 25 mg capsule   TAKE ONE CAPSULE BY MOUTH FOUR TIMES DAILY AS NEEDED    lisinopriL 10 MG tablet lisinopril 10 mg tablet   Take 1 tablet every day by oral route.    pantoprazole (PROTONIX) 40 MG tablet pantoprazole 40 mg tablet,delayed release   Take 1 tablet every day by oral route.    pravastatin (PRAVACHOL) 10 MG tablet pravastatin 10 mg tablet   Take 1 tablet(s) every day by oral route.    traZODone (DESYREL) 100 MG tablet trazodone 100 mg tablet   Take 2 tablets every day by oral route at bedtime.     Family History       Problem Relation (Age of Onset)    Heart disease Mother, Father    Kidney cancer Brother          Tobacco Use    Smoking status: Every Day     Packs/day: 0.50     Types: Cigarettes    Smokeless tobacco: Never   Substance and Sexual Activity    Alcohol use: Never    Drug use: Never    Sexual activity: Not on file     Review of Systems   Constitutional: Negative. Negative for weight gain.   HENT: Negative.     Eyes: Negative.    Cardiovascular: Negative.  Negative for chest pain, leg swelling and  palpitations.   Respiratory: Negative.  Negative for shortness of breath.    Endocrine: Negative.    Hematologic/Lymphatic: Negative.    Skin: Negative.    Musculoskeletal:  Negative for muscle weakness.   Gastrointestinal: Negative.    Genitourinary: Negative.    Neurological: Negative.  Negative for dizziness.   Psychiatric/Behavioral: Negative.     Allergic/Immunologic: Negative.    All other systems reviewed and are negative.  Objective:     Vital Signs (Most Recent):  Temp: 97.7 °F (36.5 °C) (05/09/23 0705)  Pulse: 99 (05/09/23 0806)  Resp: (Abnormal) 23 (05/09/23 0806)  BP: 106/75 (05/09/23 0805)  SpO2: (Abnormal) 94 % (05/09/23 0806) Vital Signs (24h Range):  Temp:  [97.7 °F (36.5 °C)-101.1 °F (38.4 °C)] 97.7 °F (36.5 °C)  Pulse:  [] 99  Resp:  [20-42] 23  SpO2:  [83 %-100 %] 94 %  BP: ()/() 106/75  Arterial Line BP: (103-123)/(62-76) 116/69     Weight: 49 kg (108 lb 0.4 oz)  Body mass index is 20.41 kg/m².    SpO2: (Abnormal) 94 %         Intake/Output Summary (Last 24 hours) at 5/9/2023 0836  Last data filed at 5/9/2023 0800  Gross per 24 hour   Intake 2623.31 ml   Output 38 ml   Net 2585.31 ml       Lines/Drains/Airways       Drain       Name Duration         Urethral Catheter 05/08/23 2150 Silicone 16 Fr. <1 day              Arterial Line       Name Duration    Arterial Line 05/09/23 0135 Right Radial <1 day              Peripheral Intravenous Line       Name Duration         Peripheral IV - Single Lumen 05/08/23 2124 20 G Anterior;Distal;Left Forearm <1 day         Peripheral IV - Single Lumen 05/08/23 2129 20 G Right Antecubital <1 day         Peripheral IV - Single Lumen 05/08/23 2208 20 G Left Antecubital <1 day                     Physical Exam  Vitals and nursing note reviewed.   Constitutional:       Appearance: She is well-developed.   HENT:      Head: Normocephalic and atraumatic.   Eyes:      Conjunctiva/sclera: Conjunctivae normal.      Pupils: Pupils are equal, round, and  reactive to light.   Cardiovascular:      Rate and Rhythm: Normal rate and regular rhythm.      Pulses: Intact distal pulses.      Heart sounds: Normal heart sounds.   Pulmonary:      Effort: Pulmonary effort is normal.      Breath sounds: Rhonchi present.   Abdominal:      General: Bowel sounds are normal.      Palpations: Abdomen is soft.   Musculoskeletal:         General: Normal range of motion.      Cervical back: Normal range of motion and neck supple.   Skin:     General: Skin is warm and dry.   Neurological:      Mental Status: She is alert and oriented to person, place, and time.        Significant Labs: All pertinent lab results from the last 24 hours have been reviewed.    Significant Imaging: X-Ray: CXR: X-Ray Chest 1 View (CXR): No results found for this visit on 05/08/23.    Assessment and Plan:     NSTEMI (non-ST elevated myocardial infarction)  67-year-old female with PMHx for hypertension, COPD, stroke, chronic pain, narcotic dependence, anxiety, depression, and tobacco abuse admitted for pneumonia/sepsis with hypxemia.  EKG shows sinus tachycardia, LBBB isolated lead V4 with q wave and nonspecific ST elevation. Troponin is elevated at around 2. Sx are SOB, no CP.    Continue IV heparin, add asa, check echo, NSTEMI vs demand ischemia, consider cath once sepsis stable  Continue supportive care        VTE Risk Mitigation (From admission, onward)           Ordered     Place sequential compression device  Until discontinued         05/09/23 0152     IP VTE LOW RISK PATIENT  Once         05/09/23 0152     heparin 25,000 units in dextrose 5% (100 units/ml) IV bolus from bag - ADDITIONAL PRN BOLUS - 60 units/kg (max bolus 4000 units)  As needed (PRN)        Question:  Heparin Infusion Adjustment (DO NOT MODIFY ANSWER)  Answer:  [email or web address]\epic\Images\Pharmacy\HeparinInfusions\heparin LOW INTENSITY nomogram for [facility] [id number].pdf    05/08/23 2350     heparin 25,000 units in dextrose 5% (100 units/ml) IV  bolus from bag - ADDITIONAL PRN BOLUS - 30 units/kg (max bolus 4000 units)  As needed (PRN)        Question:  Heparin Infusion Adjustment (DO NOT MODIFY ANSWER)  Answer:  [email or web address]\epic\Images\Pharmacy\HeparinInfusions\heparin LOW INTENSITY nomogram for [facility] [id number].pdf    05/08/23 2350     heparin 25,000 units in dextrose 5% 250 mL (100 units/mL) infusion LOW INTENSITY nomogram - [facility]  Continuous        Question Answer Comment   Heparin Infusion Adjustment (DO NOT MODIFY ANSWER) [email or web address]\epic\Images\Pharmacy\HeparinInfusions\heparin LOW INTENSITY nomogram for [facility] [id number].pdf    Begin at (in units/kg/hr) 12        05/08/23 2350                    Thank you for your consult. I will follow-up with patient. Please contact us if you have any additional questions.    Doyle Diallo MD  Cardiology   [facility] - [facility]ensive [facility] ([facility])

## 2023-05-09 NOTE — PLAN OF CARE
Weaned pt to room air; toward end of shift, placed pt on 2L when dozing off.  Troponin trending downward.  Echocardiogram and carotid US performed this morning; results to be communicated with patient via physician.  Still no BM this shift.  Remains on heparin gtt; currently @ 11 units.  Family updated via phone.

## 2023-05-09 NOTE — PLAN OF CARE
P.T. EVAL COMPLETE.  PT CURRENTLY REQUIRES MODA FOR BED MOBILITY AND TF BED<>CHAIR.  P.T. RECOMMENDS SNF VS HHPT AT D/C DEPENDING ON PROGRESS WITH POC

## 2023-05-10 ENCOUNTER — CLINICAL SUPPORT (OUTPATIENT)
Dept: SMOKING CESSATION | Facility: CLINIC | Age: 68
End: 2023-05-10
Payer: COMMERCIAL

## 2023-05-10 ENCOUNTER — EPISODE CHANGES (OUTPATIENT)
Dept: CARDIOLOGY | Facility: CLINIC | Age: 68
End: 2023-05-10

## 2023-05-10 DIAGNOSIS — F17.200 NICOTINE DEPENDENCE: Primary | ICD-10-CM

## 2023-05-10 PROBLEM — I50.43 ACUTE ON CHRONIC COMBINED SYSTOLIC AND DIASTOLIC CONGESTIVE HEART FAILURE: Status: ACTIVE | Noted: 2023-05-10

## 2023-05-10 LAB
ANION GAP SERPL CALC-SCNC: 10 MMOL/L (ref 8–16)
APTT PPP: 32.3 SEC (ref 21–32)
APTT PPP: 32.3 SEC (ref 21–32)
APTT PPP: 34.7 SEC (ref 21–32)
APTT PPP: 39.7 SEC (ref 21–32)
BACTERIA UR CULT: NO GROWTH
BASOPHILS # BLD AUTO: 0.03 K/UL (ref 0–0.2)
BASOPHILS NFR BLD: 0.1 % (ref 0–1.9)
BUN SERPL-MCNC: 23 MG/DL (ref 8–23)
CALCIUM SERPL-MCNC: 8.8 MG/DL (ref 8.7–10.5)
CHLORIDE SERPL-SCNC: 105 MMOL/L (ref 95–110)
CO2 SERPL-SCNC: 24 MMOL/L (ref 23–29)
CREAT SERPL-MCNC: 1 MG/DL (ref 0.5–1.4)
DIFFERENTIAL METHOD: ABNORMAL
EOSINOPHIL # BLD AUTO: 0 K/UL (ref 0–0.5)
EOSINOPHIL NFR BLD: 0 % (ref 0–8)
ERYTHROCYTE [DISTWIDTH] IN BLOOD BY AUTOMATED COUNT: 13.2 % (ref 11.5–14.5)
EST. GFR  (NO RACE VARIABLE): >60 ML/MIN/1.73 M^2
GLUCOSE SERPL-MCNC: 158 MG/DL (ref 70–110)
HCT VFR BLD AUTO: 39 % (ref 37–48.5)
HGB BLD-MCNC: 12.9 G/DL (ref 12–16)
IMM GRANULOCYTES # BLD AUTO: 0.17 K/UL (ref 0–0.04)
IMM GRANULOCYTES NFR BLD AUTO: 0.8 % (ref 0–0.5)
LYMPHOCYTES # BLD AUTO: 1.3 K/UL (ref 1–4.8)
LYMPHOCYTES NFR BLD: 6.1 % (ref 18–48)
MAGNESIUM SERPL-MCNC: 1.9 MG/DL (ref 1.6–2.6)
MCH RBC QN AUTO: 28.7 PG (ref 27–31)
MCHC RBC AUTO-ENTMCNC: 33.1 G/DL (ref 32–36)
MCV RBC AUTO: 87 FL (ref 82–98)
MONOCYTES # BLD AUTO: 0.7 K/UL (ref 0.3–1)
MONOCYTES NFR BLD: 3.3 % (ref 4–15)
NEUTROPHILS # BLD AUTO: 19.4 K/UL (ref 1.8–7.7)
NEUTROPHILS NFR BLD: 89.7 % (ref 38–73)
NRBC BLD-RTO: 0 /100 WBC
PLATELET # BLD AUTO: 283 K/UL (ref 150–450)
PMV BLD AUTO: 10.7 FL (ref 9.2–12.9)
POCT GLUCOSE: 138 MG/DL (ref 70–110)
POCT GLUCOSE: 140 MG/DL (ref 70–110)
POCT GLUCOSE: 141 MG/DL (ref 70–110)
POCT GLUCOSE: 179 MG/DL (ref 70–110)
POTASSIUM SERPL-SCNC: 4.8 MMOL/L (ref 3.5–5.1)
PROCALCITONIN SERPL IA-MCNC: 9.99 NG/ML
RBC # BLD AUTO: 4.5 M/UL (ref 4–5.4)
SODIUM SERPL-SCNC: 139 MMOL/L (ref 136–145)
VANCOMYCIN SERPL-MCNC: 12 UG/ML
WBC # BLD AUTO: 21.65 K/UL (ref 3.9–12.7)

## 2023-05-10 PROCEDURE — 93010 EKG 12-LEAD: ICD-10-PCS | Mod: ,,, | Performed by: INTERNAL MEDICINE

## 2023-05-10 PROCEDURE — 99407 PR TOBACCO USE CESSATION INTENSIVE >10 MINUTES: ICD-10-PCS | Mod: S$GLB,,,

## 2023-05-10 PROCEDURE — 97535 SELF CARE MNGMENT TRAINING: CPT

## 2023-05-10 PROCEDURE — 94760 N-INVAS EAR/PLS OXIMETRY 1: CPT

## 2023-05-10 PROCEDURE — 94640 AIRWAY INHALATION TREATMENT: CPT

## 2023-05-10 PROCEDURE — 25000003 PHARM REV CODE 250: Performed by: INTERNAL MEDICINE

## 2023-05-10 PROCEDURE — 99900035 HC TECH TIME PER 15 MIN (STAT)

## 2023-05-10 PROCEDURE — 85730 THROMBOPLASTIN TIME PARTIAL: CPT | Performed by: FAMILY MEDICINE

## 2023-05-10 PROCEDURE — 99232 SBSQ HOSP IP/OBS MODERATE 35: CPT | Mod: ,,, | Performed by: INTERNAL MEDICINE

## 2023-05-10 PROCEDURE — 83735 ASSAY OF MAGNESIUM: CPT | Performed by: NURSE PRACTITIONER

## 2023-05-10 PROCEDURE — 21400001 HC TELEMETRY ROOM

## 2023-05-10 PROCEDURE — 25000242 PHARM REV CODE 250 ALT 637 W/ HCPCS: Performed by: INTERNAL MEDICINE

## 2023-05-10 PROCEDURE — 94761 N-INVAS EAR/PLS OXIMETRY MLT: CPT

## 2023-05-10 PROCEDURE — 97530 THERAPEUTIC ACTIVITIES: CPT

## 2023-05-10 PROCEDURE — 93010 ELECTROCARDIOGRAM REPORT: CPT | Mod: ,,, | Performed by: INTERNAL MEDICINE

## 2023-05-10 PROCEDURE — 94799 UNLISTED PULMONARY SVC/PX: CPT

## 2023-05-10 PROCEDURE — 27000221 HC OXYGEN, UP TO 24 HOURS

## 2023-05-10 PROCEDURE — 25000242 PHARM REV CODE 250 ALT 637 W/ HCPCS: Performed by: NURSE PRACTITIONER

## 2023-05-10 PROCEDURE — 94660 CPAP INITIATION&MGMT: CPT

## 2023-05-10 PROCEDURE — 99407 BEHAV CHNG SMOKING > 10 MIN: CPT | Mod: S$GLB,,,

## 2023-05-10 PROCEDURE — 63600175 PHARM REV CODE 636 W HCPCS: Performed by: INTERNAL MEDICINE

## 2023-05-10 PROCEDURE — 63600175 PHARM REV CODE 636 W HCPCS: Performed by: EMERGENCY MEDICINE

## 2023-05-10 PROCEDURE — 63600175 PHARM REV CODE 636 W HCPCS: Performed by: FAMILY MEDICINE

## 2023-05-10 PROCEDURE — 80202 ASSAY OF VANCOMYCIN: CPT | Performed by: FAMILY MEDICINE

## 2023-05-10 PROCEDURE — 25000003 PHARM REV CODE 250: Performed by: NURSE PRACTITIONER

## 2023-05-10 PROCEDURE — 93005 ELECTROCARDIOGRAM TRACING: CPT

## 2023-05-10 PROCEDURE — 25000003 PHARM REV CODE 250: Performed by: FAMILY MEDICINE

## 2023-05-10 PROCEDURE — 85025 COMPLETE CBC W/AUTO DIFF WBC: CPT | Performed by: EMERGENCY MEDICINE

## 2023-05-10 PROCEDURE — 63600175 PHARM REV CODE 636 W HCPCS: Performed by: NURSE PRACTITIONER

## 2023-05-10 PROCEDURE — 85730 THROMBOPLASTIN TIME PARTIAL: CPT | Mod: 91 | Performed by: FAMILY MEDICINE

## 2023-05-10 PROCEDURE — 84145 PROCALCITONIN (PCT): CPT | Performed by: FAMILY MEDICINE

## 2023-05-10 PROCEDURE — 99232 PR SUBSEQUENT HOSPITAL CARE,LEVL II: ICD-10-PCS | Mod: ,,, | Performed by: INTERNAL MEDICINE

## 2023-05-10 PROCEDURE — 25000242 PHARM REV CODE 250 ALT 637 W/ HCPCS: Performed by: FAMILY MEDICINE

## 2023-05-10 PROCEDURE — 80048 BASIC METABOLIC PNL TOTAL CA: CPT | Performed by: NURSE PRACTITIONER

## 2023-05-10 RX ORDER — IPRATROPIUM BROMIDE AND ALBUTEROL SULFATE 2.5; .5 MG/3ML; MG/3ML
3 SOLUTION RESPIRATORY (INHALATION)
Status: DISCONTINUED | OUTPATIENT
Start: 2023-05-10 | End: 2023-05-11 | Stop reason: HOSPADM

## 2023-05-10 RX ORDER — ALBUTEROL SULFATE 0.83 MG/ML
2.5 SOLUTION RESPIRATORY (INHALATION) EVERY 4 HOURS PRN
Status: DISCONTINUED | OUTPATIENT
Start: 2023-05-10 | End: 2023-05-10

## 2023-05-10 RX ORDER — LEVALBUTEROL INHALATION SOLUTION 0.63 MG/3ML
1.25 SOLUTION RESPIRATORY (INHALATION)
Status: DISCONTINUED | OUTPATIENT
Start: 2023-05-10 | End: 2023-05-10

## 2023-05-10 RX ORDER — FUROSEMIDE 10 MG/ML
40 INJECTION INTRAMUSCULAR; INTRAVENOUS DAILY
Status: DISCONTINUED | OUTPATIENT
Start: 2023-05-10 | End: 2023-05-11 | Stop reason: HOSPADM

## 2023-05-10 RX ORDER — MORPHINE SULFATE 2 MG/ML
2 INJECTION, SOLUTION INTRAMUSCULAR; INTRAVENOUS ONCE
Status: DISCONTINUED | OUTPATIENT
Start: 2023-05-10 | End: 2023-05-10

## 2023-05-10 RX ORDER — IPRATROPIUM BROMIDE 0.5 MG/2.5ML
0.5 SOLUTION RESPIRATORY (INHALATION)
Status: DISCONTINUED | OUTPATIENT
Start: 2023-05-10 | End: 2023-05-10

## 2023-05-10 RX ORDER — IPRATROPIUM BROMIDE AND ALBUTEROL SULFATE 2.5; .5 MG/3ML; MG/3ML
3 SOLUTION RESPIRATORY (INHALATION) EVERY 4 HOURS PRN
Status: DISCONTINUED | OUTPATIENT
Start: 2023-05-10 | End: 2023-05-10

## 2023-05-10 RX ORDER — METOPROLOL TARTRATE 25 MG/1
25 TABLET, FILM COATED ORAL 2 TIMES DAILY
Status: DISCONTINUED | OUTPATIENT
Start: 2023-05-10 | End: 2023-05-11 | Stop reason: HOSPADM

## 2023-05-10 RX ADMIN — PANTOPRAZOLE SODIUM 40 MG: 40 TABLET, DELAYED RELEASE ORAL at 08:05

## 2023-05-10 RX ADMIN — MUPIROCIN: 20 OINTMENT TOPICAL at 08:05

## 2023-05-10 RX ADMIN — SODIUM BICARBONATE 650 MG TABLET 650 MG: at 08:05

## 2023-05-10 RX ADMIN — IPRATROPIUM BROMIDE AND ALBUTEROL SULFATE 3 ML: .5; 3 SOLUTION RESPIRATORY (INHALATION) at 01:05

## 2023-05-10 RX ADMIN — HEPARIN SODIUM 15 UNITS/KG/HR: 10000 INJECTION, SOLUTION INTRAVENOUS at 02:05

## 2023-05-10 RX ADMIN — METOPROLOL TARTRATE 25 MG: 25 TABLET, FILM COATED ORAL at 10:05

## 2023-05-10 RX ADMIN — ASPIRIN 81 MG: 81 TABLET, CHEWABLE ORAL at 08:05

## 2023-05-10 RX ADMIN — SODIUM BICARBONATE 650 MG TABLET 650 MG: at 02:05

## 2023-05-10 RX ADMIN — GUAIFENESIN 600 MG: 600 TABLET, EXTENDED RELEASE ORAL at 08:05

## 2023-05-10 RX ADMIN — CEFEPIME 2 G: 2 INJECTION, POWDER, FOR SOLUTION INTRAVENOUS at 02:05

## 2023-05-10 RX ADMIN — ACETAMINOPHEN 650 MG: 325 TABLET ORAL at 07:05

## 2023-05-10 RX ADMIN — LEVALBUTEROL HYDROCHLORIDE 1.25 MG: 0.63 SOLUTION RESPIRATORY (INHALATION) at 07:05

## 2023-05-10 RX ADMIN — IPRATROPIUM BROMIDE AND ALBUTEROL SULFATE 3 ML: .5; 3 SOLUTION RESPIRATORY (INHALATION) at 07:05

## 2023-05-10 RX ADMIN — MELATONIN TAB 3 MG 6 MG: 3 TAB at 07:05

## 2023-05-10 RX ADMIN — METHYLPREDNISOLONE SODIUM SUCCINATE 40 MG: 40 INJECTION, POWDER, FOR SOLUTION INTRAMUSCULAR; INTRAVENOUS at 08:05

## 2023-05-10 RX ADMIN — HEPARIN SODIUM 17 UNITS/KG/HR: 10000 INJECTION, SOLUTION INTRAVENOUS at 10:05

## 2023-05-10 RX ADMIN — PRAVASTATIN SODIUM 40 MG: 20 TABLET ORAL at 08:05

## 2023-05-10 RX ADMIN — METHYLPREDNISOLONE SODIUM SUCCINATE 80 MG: 40 INJECTION, POWDER, FOR SOLUTION INTRAMUSCULAR; INTRAVENOUS at 05:05

## 2023-05-10 RX ADMIN — METOPROLOL TARTRATE 25 MG: 25 TABLET, FILM COATED ORAL at 08:05

## 2023-05-10 RX ADMIN — FUROSEMIDE 40 MG: 10 INJECTION, SOLUTION INTRAMUSCULAR; INTRAVENOUS at 06:05

## 2023-05-10 RX ADMIN — VANCOMYCIN HYDROCHLORIDE 750 MG: 750 INJECTION, POWDER, LYOPHILIZED, FOR SOLUTION INTRAVENOUS at 11:05

## 2023-05-10 RX ADMIN — CEFEPIME 2 G: 2 INJECTION, POWDER, FOR SOLUTION INTRAVENOUS at 03:05

## 2023-05-10 NOTE — ASSESSMENT & PLAN NOTE
Broad-spectrum antibiotics started initially  Cultures NGTD  CXR stable  Oxygen requirement now weaned to room air  Stop Vanc  Would continue Cefepime for another day and then can transition to oral agent if no new micro evidence

## 2023-05-10 NOTE — PROGRESS NOTES
[facility] - Intensive Care ([facility])  Cardiology  Progress Note    Patient Name: Margie Zamudio  MRN: [id number]  Admission Date: 5/8/2023  Hospital Length of Stay: 1 days  Code Status: Full Code   Attending Physician: Renetta Alcantar MD   Primary Care Physician: Luis Mcmullen MD  Expected Discharge Date:   Principal Problem:Sepsis with acute hypoxic respiratory failure    Subjective:     Hospital Course:   5-10-23  no c/o , nurse states some confusion, continue asa, heparin, add b blockers      Interval History:     Review of Systems   Constitutional: Negative. Negative for weight gain.   HENT: Negative.     Eyes: Negative.    Cardiovascular: Negative.  Negative for chest pain, leg swelling and palpitations.   Respiratory: Negative.  Negative for shortness of breath.    Endocrine: Negative.    Hematologic/Lymphatic: Negative.    Skin: Negative.    Musculoskeletal:  Negative for muscle weakness.   Gastrointestinal: Negative.    Genitourinary: Negative.    Neurological: Negative.  Negative for dizziness.   Psychiatric/Behavioral: Negative.     Allergic/Immunologic: Negative.    All other systems reviewed and are negative.  Objective:     Vital Signs (Most Recent):  Temp: 98 °F (36.7 °C) (05/10/23 0715)  Pulse: 81 (05/10/23 0755)  Resp: 18 (05/10/23 0755)  BP: 131/76 (05/10/23 0715)  SpO2: (Abnormal) 94 % (05/10/23 0755) Vital Signs (24h Range):  Temp:  [97.5 °F (36.4 °C)-98.3 °F (36.8 °C)] 98 °F (36.7 °C)  Pulse:  [] 81  Resp:  [18-43] 18  SpO2:  [64 %-99 %] 94 %  BP: ()/(59-86) 131/76  Arterial Line BP: ()/(56-74) 121/70     Weight: 49.5 kg (109 lb 2 oz)  Body mass index is 20.62 kg/m².     SpO2: (Abnormal) 94 %         Intake/Output Summary (Last 24 hours) at 5/10/2023 0847  Last data filed at 5/10/2023 0600  Gross per 24 hour   Intake 642.67 ml   Output 550 ml   Net 92.67 ml       Lines/Drains/Airways       Drain       Name Duration         Urethral Catheter 05/08/23 2150 Silicone 16 Fr. 1 day               Arterial Line       Name Duration    Arterial Line 05/09/23 0135 Right Radial 1 day              Peripheral Intravenous Line       Name Duration         Peripheral IV - Single Lumen 05/08/23 2129 20 G Right Antecubital 1 day         Peripheral IV - Single Lumen 05/10/23 0250 22 G Posterior;Right Hand <1 day                       Physical Exam  Vitals and nursing note reviewed.   Constitutional:       Appearance: She is well-developed.   HENT:      Head: Normocephalic and atraumatic.   Eyes:      Conjunctiva/sclera: Conjunctivae normal.      Pupils: Pupils are equal, round, and reactive to light.   Cardiovascular:      Rate and Rhythm: Normal rate and regular rhythm.      Pulses: Intact distal pulses.      Heart sounds: Normal heart sounds.   Pulmonary:      Effort: Pulmonary effort is normal.      Breath sounds: Normal breath sounds.   Abdominal:      General: Bowel sounds are normal.      Palpations: Abdomen is soft.   Musculoskeletal:         General: Normal range of motion.      Cervical back: Normal range of motion and neck supple.   Skin:     General: Skin is warm and dry.   Neurological:      Mental Status: She is alert and oriented to person, place, and time.          Significant Labs: All pertinent lab results from the last 24 hours have been reviewed.    Significant Imaging: X-Ray: CXR: X-Ray Chest 1 View (CXR): No results found for this visit on 05/08/23.    Assessment and Plan:     Brief HPI:     NSTEMI (non-ST elevated myocardial infarction)  67-year-old female with PMHx for hypertension, COPD, stroke, chronic pain, narcotic dependence, anxiety, depression, and tobacco abuse admitted for pneumonia/sepsis with hypxemia.  EKG shows sinus tachycardia, LBBB isolated lead V4 with q wave and nonspecific ST elevation. Troponin is elevated at around 2. Sx are SOB, no CP.    Continue IV heparin, add asa, check echo, NSTEMI vs demand ischemia, consider cath once sepsis stable  Continue supportive  care        VTE Risk Mitigation (From admission, onward)         Ordered     Place sequential compression device  Until discontinued         05/09/23 0152     IP VTE LOW RISK PATIENT  Once         05/09/23 0152     heparin 25,000 units in dextrose 5% (100 units/ml) IV bolus from bag - ADDITIONAL PRN BOLUS - 60 units/kg (max bolus 4000 units)  As needed (PRN)        Question:  Heparin Infusion Adjustment (DO NOT MODIFY ANSWER)  Answer:  [email or web address]\epic\Images\Pharmacy\HeparinInfusions\heparin LOW INTENSITY nomogram for [facility] [id number].pdf    05/08/23 2350     heparin 25,000 units in dextrose 5% (100 units/ml) IV bolus from bag - ADDITIONAL PRN BOLUS - 30 units/kg (max bolus 4000 units)  As needed (PRN)        Question:  Heparin Infusion Adjustment (DO NOT MODIFY ANSWER)  Answer:  [email or web address]\epic\Images\Pharmacy\HeparinInfusions\heparin LOW INTENSITY nomogram for [facility] [id number].pdf    05/08/23 2350     heparin 25,000 units in dextrose 5% 250 mL (100 units/mL) infusion LOW INTENSITY nomogram - [facility]  Continuous        Question Answer Comment   Heparin Infusion Adjustment (DO NOT MODIFY ANSWER) [email or web address]\epic\Images\Pharmacy\HeparinInfusions\heparin LOW INTENSITY nomogram for [facility] [id number].pdf    Begin at (in units/kg/hr) 12        05/08/23 2350                Doyle Diallo MD  Cardiology  [facility] - [facility] ([facility])

## 2023-05-10 NOTE — PLAN OF CARE
Problem: Infection  Goal: Absence of Infection Signs and Symptoms  Outcome: Ongoing, Progressing  Intervention: Prevent or Manage Infection  Flowsheets (Taken 5/10/2023 0355)  Fever Reduction/Comfort Measures: lightweight clothing  Isolation Precautions: precautions discontinued     Problem: Fall Injury Risk  Goal: Absence of Fall and Fall-Related Injury  Outcome: Ongoing, Progressing  Intervention: Identify and Manage Contributors  Flowsheets (Taken 5/10/2023 0355)  Self-Care Promotion:   independence encouraged   safe use of adaptive equipment encouraged  Medication Review/Management: medications reviewed  Intervention: Promote Injury-Free Environment  Flowsheets (Taken 5/10/2023 0355)  Safety Promotion/Fall Prevention:   bed alarm set   side rails raised x 3   instructed to call staff for mobility

## 2023-05-10 NOTE — NURSING
No events overnight, patient tolerated BIPAP for a few hours. Periods of confusion noted. Heparin drip infusing per EMAR.

## 2023-05-10 NOTE — ASSESSMENT & PLAN NOTE
Hold for respiratory distress  Add back home medication(s) as indicated to avoid withdrawal    Consider restarting

## 2023-05-10 NOTE — PLAN OF CARE
Pt disoriented to time. BP stable. Afebrile. Currently on 2L O2 NC. BIPAP at night. Art line intact. PTT monitored. Pt turned repositioned with pillows & wedge. Heel boots in place. Bed low, wheels locked, alarms audible. Bed alarm on. POC reviewed.

## 2023-05-10 NOTE — ASSESSMENT & PLAN NOTE
Considerable wheezing on exam initially; still some faint wheezing, but much improved  Continue bronchodilators  Bipap discontinued  Weaning IV steroids and likely transition to PO tomorrow  Continue guaifenesin

## 2023-05-10 NOTE — CONSULTS
Food & Nutrition Education    Diet Education: Heart Failure  Time Spent: 5 minutes    Learners: Pt    Nutrition Education provided with handouts:  Heart Failure Nutrition Therapy, Fluid-Restricted Diet, Low-Sodium Nutrition Therapy ([email or web address])    Comments:  Pt not medically appropriate for nutrition education currently. Pt current disoriented. Pt states that she has a poor appetite and did not consume any of her lunch. Pt states that she has not ate within the last x2 days. Not sure of accuracy due to the pt poor orientation. Pt states that she is willing to try ONS. Spoke with the pt RN who ordered boost plus for pt TID. Dietitian will continue to follow and provide nutrition education to patient at RD follow up or when the pt orientation improves.    Provided handout with dietitian's contact information.  Please re-consult as needed.    Thank You!   Zach Merrill, Registration Eligible, Provisional LDN

## 2023-05-10 NOTE — ASSESSMENT & PLAN NOTE
Initial troponin 2.476, peaked 4.052 and downtrended  Cardiology following  Heparin drip  Chest pain free

## 2023-05-10 NOTE — ASSESSMENT & PLAN NOTE
Us carotid bilateral with significant soft tissue lesion/plaque  mra neck pending  Ct head with chronic changes, old infarcts  kub pending to ensure no metal

## 2023-05-10 NOTE — ASSESSMENT & PLAN NOTE
Patient with Hypoxic Respiratory failure which is Acute on chronic.  she is on home oxygen at 2 LPM. Supplemental oxygen was provided and noted- Oxygen Concentration (%):  [28-30] 28    .   Signs/symptoms of respiratory failure include- tachypnea, increased work of breathing and respiratory distress. Contributing diagnoses includes - CHF and COPD Labs and images were reviewed. Patient Has recent ABG, which has been reviewed. Will treat underlying causes and adjust management of respiratory failure as follows- continue supplemental oxygen, intravenous antibiotic(s), systemic steroids, breathing treatments  Continues to be in severe respiratory distress requiring nippv  abg stat  Chest x-ray stat

## 2023-05-10 NOTE — PROGRESS NOTES
Vancomycin random level @ 0838 = 12.0 mcg/ml (~12 hour random level after 750 mg dose).  Vancomycin discontinued per Dr. Ackerman. Will continue cefepime IV for now.   Therapy with vancomycin complete and/or consult discontinued by provider.  Pharmacy will sign off, please re-consult as needed.    Thank you for allowing us to participate in this patient's care.   Katherine McArdle, Pharm.D. 5/10/2023 1:09 PM

## 2023-05-10 NOTE — SUBJECTIVE & OBJECTIVE
Interval History:   No acute events.  Afebrile.  Up in the bedside chair on room air during my exam.  Says she is feeling much better.  Cough and sputum much improved.        Objective:     Vital Signs (Most Recent):  Temp: 98 °F (36.7 °C) (05/10/23 0715)  Pulse: 81 (05/10/23 0755)  Resp: 18 (05/10/23 0755)  BP: 131/76 (05/10/23 0715)  SpO2: (!) 94 % (05/10/23 0755) Vital Signs (24h Range):  Temp:  [97.5 °F (36.4 °C)-98.3 °F (36.8 °C)] 98 °F (36.7 °C)  Pulse:  [] 81  Resp:  [18-43] 18  SpO2:  [64 %-99 %] 94 %  BP: ()/(63-86) 131/76  Arterial Line BP: ()/(56-74) 121/70     Weight: 49.5 kg (109 lb 2 oz)  Body mass index is 20.62 kg/m².      Intake/Output Summary (Last 24 hours) at 5/10/2023 0917  Last data filed at 5/10/2023 0600  Gross per 24 hour   Intake 614.23 ml   Output 525 ml   Net 89.23 ml        Physical Exam  Vitals and nursing note reviewed.   Constitutional:       General: She is not in acute distress.     Comments: Bedside chair   Cardiovascular:      Rate and Rhythm: Normal rate and regular rhythm.      Pulses: Normal pulses.      Heart sounds: No murmur heard.  Pulmonary:      Effort: Pulmonary effort is normal. No respiratory distress.      Breath sounds: Wheezing present. No rhonchi or rales.      Comments: Faint expiratory wheezing throughout  Abdominal:      General: Abdomen is flat. There is no distension.      Palpations: Abdomen is soft.      Tenderness: There is no abdominal tenderness.   Musculoskeletal:      Right lower leg: No edema.      Left lower leg: No edema.   Neurological:      General: No focal deficit present.      Mental Status: She is alert and oriented to person, place, and time. Mental status is at baseline.         Review of Systems    Vents:  Oxygen Concentration (%): 28 (05/10/23 0755)    Lines/Drains/Airways       Drain  Duration                  Urethral Catheter 05/08/23 2150 Silicone 16 Fr. 1 day              Arterial Line  Duration             Arterial  Line 05/09/23 0135 Right Radial 1 day              Peripheral Intravenous Line  Duration                  Peripheral IV - Single Lumen 05/08/23 2129 20 G Right Antecubital 1 day         Peripheral IV - Single Lumen 05/10/23 0250 22 G Posterior;Right Hand <1 day                    Significant Labs:    CBC/Anemia Profile:  Recent Labs   Lab 05/08/23  2207 05/09/23  0254 05/09/23  0405 05/10/23  0454   WBC 25.68*  --  19.98* 21.65*   HGB 16.3*  --  13.8 12.9   HCT 54.6*  --  44.0 39.0     --  264 283   MCV 95  --  89 87   RDW 13.1  --  13.0 13.2   IRON  --  36  --   --         Chemistries:  Recent Labs   Lab 05/08/23  2207 05/09/23  0405 05/10/23  0454    143 139   K 4.3 3.2* 4.8    107 105   CO2 15* 18* 24   BUN 18 22 23   CREATININE 1.8* 1.3 1.0   CALCIUM 9.3 8.1* 8.8   ALBUMIN 3.5  --   --    PROT 7.2  --   --    BILITOT 0.2  --   --    ALKPHOS 121  --   --    ALT 41  --   --    AST 86*  --   --    MG  --  2.0 1.9       All pertinent labs within the past 24 hours have been reviewed.    Significant Imaging:  I have reviewed all pertinent imaging results/findings within the past 24 hours.

## 2023-05-10 NOTE — SUBJECTIVE & OBJECTIVE
Interval History:     Review of Systems   Constitutional: Negative. Negative for weight gain.   HENT: Negative.     Eyes: Negative.    Cardiovascular: Negative.  Negative for chest pain, leg swelling and palpitations.   Respiratory: Negative.  Negative for shortness of breath.    Endocrine: Negative.    Hematologic/Lymphatic: Negative.    Skin: Negative.    Musculoskeletal:  Negative for muscle weakness.   Gastrointestinal: Negative.    Genitourinary: Negative.    Neurological: Negative.  Negative for dizziness.   Psychiatric/Behavioral: Negative.     Allergic/Immunologic: Negative.    All other systems reviewed and are negative.  Objective:     Vital Signs (Most Recent):  Temp: 98 °F (36.7 °C) (05/10/23 0715)  Pulse: 81 (05/10/23 0755)  Resp: 18 (05/10/23 0755)  BP: 131/76 (05/10/23 0715)  SpO2: (Abnormal) 94 % (05/10/23 0755) Vital Signs (24h Range):  Temp:  [97.5 °F (36.4 °C)-98.3 °F (36.8 °C)] 98 °F (36.7 °C)  Pulse:  [] 81  Resp:  [18-43] 18  SpO2:  [64 %-99 %] 94 %  BP: ()/(59-86) 131/76  Arterial Line BP: ()/(56-74) 121/70     Weight: 49.5 kg (109 lb 2 oz)  Body mass index is 20.62 kg/m².     SpO2: (Abnormal) 94 %         Intake/Output Summary (Last 24 hours) at 5/10/2023 0847  Last data filed at 5/10/2023 0600  Gross per 24 hour   Intake 642.67 ml   Output 550 ml   Net 92.67 ml       Lines/Drains/Airways       Drain       Name Duration         Urethral Catheter 05/08/23 2150 Silicone 16 Fr. 1 day              Arterial Line       Name Duration    Arterial Line 05/09/23 0135 Right Radial 1 day              Peripheral Intravenous Line       Name Duration         Peripheral IV - Single Lumen 05/08/23 2129 20 G Right Antecubital 1 day         Peripheral IV - Single Lumen 05/10/23 0250 22 G Posterior;Right Hand <1 day                       Physical Exam  Vitals and nursing note reviewed.   Constitutional:       Appearance: She is well-developed.   HENT:      Head: Normocephalic and atraumatic.    Eyes:      Conjunctiva/sclera: Conjunctivae normal.      Pupils: Pupils are equal, round, and reactive to light.   Cardiovascular:      Rate and Rhythm: Normal rate and regular rhythm.      Pulses: Intact distal pulses.      Heart sounds: Normal heart sounds.   Pulmonary:      Effort: Pulmonary effort is normal.      Breath sounds: Normal breath sounds.   Abdominal:      General: Bowel sounds are normal.      Palpations: Abdomen is soft.   Musculoskeletal:         General: Normal range of motion.      Cervical back: Normal range of motion and neck supple.   Skin:     General: Skin is warm and dry.   Neurological:      Mental Status: She is alert and oriented to person, place, and time.          Significant Labs: All pertinent lab results from the last 24 hours have been reviewed.    Significant Imaging: X-Ray: CXR: X-Ray Chest 1 View (CXR): No results found for this visit on 05/08/23.

## 2023-05-10 NOTE — PT/OT/SLP PROGRESS
Physical Therapy Treatment    Patient Name:  Margie Zamudio   MRN:  [id number]    Recommendations:     Discharge Recommendations: nursing facility, skilled  Discharge Equipment Recommendations: bedside commode, walker, rolling  Barriers to discharge: Inaccessible home    Assessment:     Margie Zamudio is a 67 y.o. female admitted with a medical diagnosis of Sepsis with acute hypoxic respiratory failure.  She presents with the following impairments/functional limitations: weakness, impaired endurance, impaired self care skills, impaired functional mobility, gait instability, impaired balance, decreased safety awareness, decreased lower extremity function, decreased coordination, impaired cardiopulmonary response to activity.    Rehab Prognosis: Good; patient would benefit from acute skilled PT services to address these deficits and reach maximum level of function.    Recent Surgery: * No surgery found *     Plan:     During this hospitalization, patient to be seen 3 x/week to address the identified rehab impairments via gait training, therapeutic activities, therapeutic exercises and progress toward the following goals:    Plan of Care Expires:  05/23/23    Subjective     Chief Complaint: C/O BEING HUNGRY, C/O DIZZINESS AND NAUSEA  Patient/Family Comments/goals:   Pain/Comfort:  Pain Rating 1: 0/10      Objective:     Communicated with NURSE SCHULTZ prior to session.  Patient found supine with telemetry, oxygen, blood pressure cuff, pulse ox (continuous), peripheral IV, arterial line, alvarez catheter upon PT entry to room.     General Precautions: Standard, special contact, fall, respiratory, HARD OF HEARING  Orthopedic Precautions: N/A  Braces: N/A  Respiratory Status: Nasal cannula, flow 2 L/min     Functional Mobility:  Bed Mobility:     Rolling Left:  minimum assistance  Scooting: minimum assistance  Supine to Sit: minimum assistance  Transfers:     Sit to Stand:  minimum assistance with rolling walker  Bed to  "Chair: minimum assistance with  rolling walker  using  Step Transfer  Gait: PT AMB APPROX. 5' WITH RW AND MILY TO TF FROM BED TO CHAIR, CUES FOR UPRIGHT POSTURE AND RW SAFETY, NO SOB WITH O2 IN TOW, QUICK TO FATIGUE, RUE FLACCID, UNABLE TO  RW-NURSE AWARE  Balance: FAIR SITTING BALANCE, POOR DYNAMIC BALANCE DURING GAIT/TF  BP IN SITTING AFTER TF TO CHAIR: 154/98    AM-PAC 6 CLICK MOBILITY  Turning over in bed (including adjusting bedclothes, sheets and blankets)?: 3  Sitting down on and standing up from a chair with arms (e.g., wheelchair, bedside commode, etc.): 3  Moving from lying on back to sitting on the side of the bed?: 3  Moving to and from a bed to a chair (including a wheelchair)?: 3  Need to walk in hospital room?: 3  Climbing 3-5 steps with a railing?: 1  Basic Mobility Total Score: 16     Treatment & Education:  REVIEW ROLE OF P.T. AND POC IN ACUTE CARE HOSPITAL SETTING, REVIEW RW USE AND SAFETY DURING TF'S AND GAIT, ENCOURAGED TO INCREASE TIME OOB IN CHAIR TO TOLERANCE, EDUCATED IN AND ENCOURAGED TO PERFORM BLE THEREX WHILE SEATED OR SUPINE THROUGHOUT THE DAY TO TOLERANCE: HIP FLEX/EXT, QUAD SET, LAQ, HEEL SLIDES, AP'S.  PT AGREEABLE TO REQUESTS  PT EDUCATED ON RISK FOR FALLS DUE TO GENERALIZED WEAKNESS, EDUCATED ON "CALL DON'T FALL", ENCOURAGED TO CALL FOR ASSISTANCE WITH ALL NEEDS SUCH AS BED<>CHAIR TRANSFERS OR TRIPS TO BATHROOM, PT AGREEABLE TO SAFETY PRECAUTIONS    Patient left up in chair with all lines intact, call button in reach, chair alarm on, and NURSE notified..    GOALS:   Multidisciplinary Problems       Physical Therapy Goals          Problem: Physical Therapy    Goal Priority Disciplines Outcome Goal Variances Interventions   Physical Therapy Goal     PT, PT/OT Ongoing, Progressing     Description: LTG'S TO BE MET IN 14 DAYS (5-23-23)  PT WILL REQUIRE [facility] FOR BED MOBILITY  PT WILL REQUIRE [facility] FOR BED<>CHAIR TF'S  PT WILL  FEET WITH RW AND MILY                         Time " Tracking:     PT Received On: 05/10/23  PT Start Time: 0735     PT Stop Time: 0800  PT Total Time (min): 25 min     Billable Minutes: Evaluation 15 and Therapeutic Activity 10    Treatment Type: Treatment  PT/PTA: PT     Number of PTA visits since last PT visit: 0     05/10/2023

## 2023-05-10 NOTE — PROGRESS NOTES
[facility] - Intensive Care ([facility])  [facility] Medicine  Progress Note    Patient Name: Margie Zamudio  MRN: [id number]  Patient Class: IP- Inpatient   Admission Date: 5/8/2023  Length of Stay: 1 days  Attending Physician: Renetta Alcantar MD  Primary Care Provider: Luis Mcmullen MD        Subjective:     Principal Problem:Sepsis with acute hypoxic respiratory failure        HPI:  67F PMH hypertension, hyperlipidemia, cva, copd, congestive heart failure, chronic pain, narcotic dependence, gerd, anxiety and depression, admitted for copdx. Associated with nausea, vomiting, cough, and dyspnea. Denies chest pain. Received solumedrol and duonebs en route.    Patient febrile, tachycardic, and tachypneic. Initial workup revealed leukocytosis, acute kidney injury, hyperglycemia, elevated ast. Lactate elevated. Cardiac markers also elevated. Uds positive for marijuana. Urinalysis with rare bacteria. Chest x-ray with interstitial opacities. Ct head wo contrast with atrophy, chronic changes and old infarcts.    Blood culture negative growth to date.     Cardiology consulted and recommended medical management. Echocardiogram reviewed with 30% ejection fraction.  Us carotid bilateral with significant tissue lesion/plaque with prior [facility] carotid endarterectomy.    Patient initially required continuous nippv warranting icu admission. Patient weaned to supplemental oxygen with systemic steroids and intravenous antibiotic(s).    Hospital medicine consulted for transfer of care.     Patient reports improvement in symptoms. States inciting event was panic attack from stress. Denies dyspnea, chest pain. +Hunger. Wanting to go home. Primary pulmonologist is Dr. Beltre. She reports having ran out of inhaler medication(s).         Overview/Hospital Course:  No notes on file    Interval History: See hospital course for today      Review of Systems   Unable to perform ROS: Severe respiratory distress   Objective:     Vital Signs (Most  Recent):  Temp: 98 °F (36.7 °C) (05/10/23 0715)  Pulse: 81 (05/10/23 0755)  Resp: 18 (05/10/23 0755)  BP: 130/73 (05/10/23 1026)  SpO2: (!) 94 % (05/10/23 0755) Vital Signs (24h Range):  Temp:  [97.5 °F (36.4 °C)-98.1 °F (36.7 °C)] 98 °F (36.7 °C)  Pulse:  [] 81  Resp:  [18-43] 18  SpO2:  [64 %-99 %] 94 %  BP: (102-131)/(66-86) 130/73  Arterial Line BP: (107-124)/(66-74) 121/70     Weight: 49.5 kg (109 lb 2 oz)  Body mass index is 20.62 kg/m².    Intake/Output Summary (Last 24 hours) at 5/10/2023 1313  Last data filed at 5/10/2023 0600  Gross per 24 hour   Intake 598.86 ml   Output 525 ml   Net 73.86 ml         Physical Exam  Vitals and nursing note reviewed. Exam conducted with a chaperone present (nursing).   Constitutional:       General: She is not in acute distress.     Appearance: She is ill-appearing. She is not toxic-appearing.      Interventions: Face mask in place.   HENT:      Head: Normocephalic and atraumatic.   Cardiovascular:      Rate and Rhythm: Normal rate.   Pulmonary:      Effort: Tachypnea, accessory muscle usage and respiratory distress present.      Breath sounds: Decreased air movement present.   Abdominal:      Palpations: Abdomen is soft.   Genitourinary:     Comments: alvarez  Skin:     General: Skin is warm.      Coloration: Skin is pale.           Significant Labs: All pertinent labs within the past 24 hours have been reviewed.  ABGs:   Recent Labs   Lab 05/08/23  2150 05/09/23  0137 05/09/23  0600   PH 7.126* 7.199* 7.381   PCO2 46.7* 40.5 38.6   HCO3 15.4* 15.8* 22.9*   POCSATURATED 96 97 97   BE -14 -12 -2   PO2 112* 108* 89     CBC:   Recent Labs   Lab 05/08/23  2207 05/09/23  0405 05/10/23  0454   WBC 25.68* 19.98* 21.65*   HGB 16.3* 13.8 12.9   HCT 54.6* 44.0 39.0    264 283     CMP:   Recent Labs   Lab 05/08/23 2207 05/09/23 0405 05/10/23  0454    143 139   K 4.3 3.2* 4.8    107 105   CO2 15* 18* 24   * 204* 158*   BUN 18 22 23   CREATININE 1.8* 1.3  1.0   CALCIUM 9.3 8.1* 8.8   PROT 7.2  --   --    ALBUMIN 3.5  --   --    BILITOT 0.2  --   --    ALKPHOS 121  --   --    AST 86*  --   --    ALT 41  --   --    ANIONGAP 20* 18* 10       Significant Imaging: I have reviewed all pertinent imaging results/findings within the past 24 hours.  CXR: I have reviewed all pertinent results/findings within the past 24 hours and my personal findings are:  pending      Assessment/Plan:      * Sepsis with acute hypoxic respiratory failure  Patient with Hypoxic Respiratory failure which is Acute on chronic.  she is on home oxygen at 2 LPM. Supplemental oxygen was provided and noted- Oxygen Concentration (%):  [28-30] 28    .   Signs/symptoms of respiratory failure include- tachypnea, increased work of breathing and respiratory distress. Contributing diagnoses includes - CHF and COPD Labs and images were reviewed. Patient Has recent ABG, which has been reviewed. Will treat underlying causes and adjust management of respiratory failure as follows- continue supplemental oxygen, intravenous antibiotic(s), systemic steroids, breathing treatments  Continues to be in severe respiratory distress requiring nippv  abg stat  Chest x-ray stat    History of carotid endarterectomy  Us carotid bilateral with significant soft tissue lesion/plaque  mra neck pending  Ct head with chronic changes, old infarcts  kub pending to ensure no metal    Dyslipidemia  Resume home statin      Narcotic dependence  Hold for respiratory distress  Add back home medication(s) as indicated to avoid withdrawal    Consider restarting    Acute cystitis with hematuria  Urinalysis unremarkable  Continue intravenous cefepime for copdx  Deescalate as clinically improving      MARCUS (acute kidney injury)  Patient with acute kidney injury likely due to IVVD/dehydration MARCUS is currently improving. Labs reviewed- Renal function/electrolytes with Estimated Creatinine Clearance: 41.8 mL/min (based on SCr of 1 mg/dL). according to  latest data. Monitor urine output and serial BMP and adjust therapy as needed. Avoid nephrotoxins and renally dose meds for GFR listed above.     Resolved     Metabolic acidosis  Discontinue Sodium bicarb  Improved      NSTEMI (non-ST elevated myocardial infarction)  Cardiology following  Heparin infusion  May need cath once medically stable      Hyperglycemia  In setting of systemic steroids  Monitor  ssi as needed      COPD exacerbation  Continue copdx treatment protocol with intravenous antibiotic(s), systemic steroids, breathing treatments, and smoking cessation counseling  Follow up with outpatient primary pulmonologist, vincent    Pneumonia of both lungs due to infectious organism  Intravenous antibiotic(s)   Procalcitonin elevated      VTE Risk Mitigation (From admission, onward)         Ordered     Place sequential compression device  Until discontinued         05/09/23 0152     IP VTE LOW RISK PATIENT  Once         05/09/23 0152     heparin 25,000 units in dextrose 5% (100 units/ml) IV bolus from bag - ADDITIONAL PRN BOLUS - 60 units/kg (max bolus 4000 units)  As needed (PRN)        Question:  Heparin Infusion Adjustment (DO NOT MODIFY ANSWER)  Answer:  \\[email or web address]\epic\Images\Pharmacy\HeparinInfusions\heparin LOW INTENSITY nomogram for [facility] [id number]    05/08/23 2350     heparin 25,000 units in dextrose 5% (100 units/ml) IV bolus from bag - ADDITIONAL PRN BOLUS - 30 units/kg (max bolus 4000 units)  As needed (PRN)        Question:  Heparin Infusion Adjustment (DO NOT MODIFY ANSWER)  Answer:  [email or web address]\[email or web address]\Images\Pharmacy\HeparinInfusions\heparin LOW INTENSITY nomogram for [facility] [id number].pdf    05/08/23 2350     heparin 25,000 units in dextrose 5% 250 mL (100 units/mL) infusion LOW INTENSITY nomogram - [facility]  Continuous        Question Answer Comment   Heparin Infusion Adjustment (DO NOT MODIFY ANSWER) [email or web address]\epic\Images\Pharmacy\HeparinInfusions\heparin LOW INTENSITY nomogram for [facility] [id number]     Begin at (in units/kg/hr) 12        05/08/23 3445                Discharge Planning   KARLA:      Code Status: Full Code   Is the patient medically ready for discharge?:     Reason for patient still in hospital (select all that apply): Patient unstable, Patient trending condition, Laboratory test, Treatment and Imaging  Discharge Plan A: Home with family            Critical care time spent on the evaluation and treatment of severe organ dysfunction, review of pertinent labs and imaging studies, discussions with consulting providers and discussions with patient/family: 49 minutes.      Renetta Alcantar MD  Department of Hospital Medicine   '[facility] - Intensive Care ([facility])

## 2023-05-10 NOTE — CONSULTS
Pharmacokinetic Assessment Follow Up: IV Vancomycin    Vancomycin serum concentration assessment(s):    The random level was drawn correctly and can be used to guide therapy at this time. The measurement is below the desired definitive target range of 15 to 20 mcg/mL.    Vancomycin Regimen Plan:    Re-dose when the random level is less than 20 mcg/mL, next level to be drawn at 0830 on 5/10    Drug levels (last 3 results):  Recent Labs   Lab Result Units 05/09/23  1927   Vancomycin, Random ug/mL 6.7       Pharmacy will continue to follow and monitor vancomycin.    Please contact pharmacy at extension [phone] for questions regarding this assessment.    Thank you for the consult,   Viki Zavala       Patient brief summary:  Margie Zamudio is a 67 y.o. female initiated on antimicrobial therapy with IV Vancomycin for treatment of lower respiratory infection    The patient's currently being pulse dosed based on renal function and drug levels.    Drug Allergies:   Review of patient's allergies indicates:   Allergen Reactions    Morphine Itching    Fentanyl Itching    Hydromorphone Itching       Actual Body Weight:   49 kg    Renal Function:   Estimated Creatinine Clearance: 32.1 mL/min (based on SCr of 1.3 mg/dL).,     Dialysis Method (if applicable):  N/A    CBC (last 72 hours):  Recent Labs   Lab Result Units 05/08/23 2207 05/09/23  0139 05/09/23 0405   WBC K/uL 25.68*  --  19.98*   Hemoglobin g/dL 16.3*  --  13.8   Hemoglobin A1C %  --  5.4  --    Hematocrit % 54.6*  --  44.0   Platelets K/uL 384  --  264   Gran % % 74.3*  --  91.7*   Lymph % % 17.5*  --  3.9*   Mono % % 5.2  --  3.1*   Eosinophil % % 0.7  --  0.0   Basophil % % 0.7  --  0.3   Differential Method  Automated  --  Automated       Metabolic Panel (last 72 hours):  Recent Labs   Lab Result Units 05/08/23 2207 05/09/23  0044 05/09/23 0405 05/09/23  0419   Sodium mmol/L 139  --  143  --    Potassium mmol/L 4.3  --  3.2*  --    Chloride mmol/L 104  --   107  --    CO2 mmol/L 15*  --  18*  --    Glucose mg/dL 364*  --  204*  --    Glucose, UA   --  2+*  --   --    BUN mg/dL 18  --  22  --    Creatinine mg/dL 1.8*  --  1.3  --    Creatinine, Urine mg/dL  --   --   --  76.4   Albumin g/dL 3.5  --   --   --    Total Bilirubin mg/dL 0.2  --   --   --    Alkaline Phosphatase U/L 121  --   --   --    AST U/L 86*  --   --   --    ALT U/L 41  --   --   --    Magnesium mg/dL  --   --  2.0  --        Vancomycin Administrations:  vancomycin given in the last 96 hours                     vancomycin 750 mg in dextrose 5 % (D5W) 250 mL IVPB (Vial-Mate) (mg) 750 mg New Bag 05/09/23 2026    vancomycin 750 mg in dextrose 5 % (D5W) 250 mL IVPB (Vial-Mate) (mg) 750 mg New Bag 05/09/23 0350                    Microbiologic Results:  Microbiology Results (last 7 days)       Procedure Component Value Units Date/Time    Blood culture #2 **CANNOT BE ORDERED STAT** [[id number]] Collected: 05/09/23 0042    Order Status: Sent Specimen: Blood from Peripheral, Antecubital, Left Updated: 05/09/23 1414    Blood culture #1 **CANNOT BE ORDERED STAT** [[id number]] Collected: 05/08/23 2208    Order Status: Completed Specimen: Blood from Peripheral, Antecubital, Left Updated: 05/09/23 0915     Blood Culture, Routine No Growth to date    Influenza A & B by Molecular [[id number]] Collected: 05/09/23 0440    Order Status: Completed Specimen: Nasopharyngeal Swab Updated: 05/09/23 0518     Influenza A, Molecular Negative     Influenza B, Molecular Negative     Flu A & B Source Nasal swab    Sputum culture [[id number]]     Order Status: No result Specimen: Respiratory     Clostridium difficile EIA [[id number]]     Order Status: No result Specimen: Stool     Stool culture [[id number]]     Order Status: No result Specimen: Stool     Urine culture [[id number]] Collected: 05/09/23 0044    Order Status: No result Specimen: Urine Updated: 05/09/23 0115

## 2023-05-10 NOTE — HOSPITAL COURSE
5-10-23  no c/o , nurse states some confusion, continue asa, heparin, add b blockers    5-11-23  Pt states wants to go home, some confusion, discussed cardiac cath, she does not want, Risks and benefits explained ,                 Stop heparin after 48 hours, start plavix, continue b blockers

## 2023-05-10 NOTE — PROGRESS NOTES
[facility] ([facility])  Pulmonology  Progress Note    Patient Name: Margie Zamudio  MRN: [id number]  Admission Date: 5/8/2023  Hospital Length of Stay: 1 days  Code Status: Full Code  Attending Provider: Renetta Alcantar MD  Primary Care Provider: Luis Mcmullen MD   Principal Problem: Sepsis with acute hypoxic respiratory failure    Subjective:     Interval History:   No acute events.  Afebrile.  Up in the bedside chair on room air during my exam.  Says she is feeling much better.  Cough and sputum much improved.        Objective:     Vital Signs (Most Recent):  Temp: 98 °F (36.7 °C) (05/10/23 0715)  Pulse: 81 (05/10/23 0755)  Resp: 18 (05/10/23 0755)  BP: 131/76 (05/10/23 0715)  SpO2: (!) 94 % (05/10/23 0755) Vital Signs (24h Range):  Temp:  [97.5 °F (36.4 °C)-98.3 °F (36.8 °C)] 98 °F (36.7 °C)  Pulse:  [] 81  Resp:  [18-43] 18  SpO2:  [64 %-99 %] 94 %  BP: ()/(63-86) 131/76  Arterial Line BP: ()/(56-74) 121/70     Weight: 49.5 kg (109 lb 2 oz)  Body mass index is 20.62 kg/m².      Intake/Output Summary (Last 24 hours) at 5/10/2023 0917  Last data filed at 5/10/2023 0600  Gross per 24 hour   Intake 614.23 ml   Output 525 ml   Net 89.23 ml        Physical Exam  Vitals and nursing note reviewed.   Constitutional:       General: She is not in acute distress.     Comments: Bedside chair   Cardiovascular:      Rate and Rhythm: Normal rate and regular rhythm.      Pulses: Normal pulses.      Heart sounds: No murmur heard.  Pulmonary:      Effort: Pulmonary effort is normal. No respiratory distress.      Breath sounds: Wheezing present. No rhonchi or rales.      Comments: Faint expiratory wheezing throughout  Abdominal:      General: Abdomen is flat. There is no distension.      Palpations: Abdomen is soft.      Tenderness: There is no abdominal tenderness.   Musculoskeletal:      Right lower leg: No edema.      Left lower leg: No edema.   Neurological:      General: No focal deficit present.       Mental Status: She is alert and oriented to person, place, and time. Mental status is at baseline.         Review of Systems    Vents:  Oxygen Concentration (%): 28 (05/10/23 0755)    Lines/Drains/Airways       Drain  Duration                  Urethral Catheter 05/08/23 2150 Silicone 16 Fr. 1 day              Arterial Line  Duration             Arterial Line 05/09/23 0135 Right Radial 1 day              Peripheral Intravenous Line  Duration                  Peripheral IV - Single Lumen 05/08/23 2129 20 G Right Antecubital 1 day         Peripheral IV - Single Lumen 05/10/23 0250 22 G Posterior;Right Hand <1 day                    Significant Labs:    CBC/Anemia Profile:  Recent Labs   Lab 05/08/23  2207 05/09/23  0254 05/09/23  0405 05/10/23  0454   WBC 25.68*  --  19.98* 21.65*   HGB 16.3*  --  13.8 12.9   HCT 54.6*  --  44.0 39.0     --  264 283   MCV 95  --  89 87   RDW 13.1  --  13.0 13.2   IRON  --  36  --   --         Chemistries:  Recent Labs   Lab 05/08/23  2207 05/09/23  0405 05/10/23  0454    143 139   K 4.3 3.2* 4.8    107 105   CO2 15* 18* 24   BUN 18 22 23   CREATININE 1.8* 1.3 1.0   CALCIUM 9.3 8.1* 8.8   ALBUMIN 3.5  --   --    PROT 7.2  --   --    BILITOT 0.2  --   --    ALKPHOS 121  --   --    ALT 41  --   --    AST 86*  --   --    MG  --  2.0 1.9       All pertinent labs within the past 24 hours have been reviewed.    Significant Imaging:  I have reviewed all pertinent imaging results/findings within the past 24 hours.      ABG  Recent Labs   Lab 05/09/23  0600   PH 7.381   PO2 89   PCO2 38.6   HCO3 22.9*   BE -2     Assessment/Plan:     Pulmonary  COPD exacerbation  Considerable wheezing on exam initially; still some faint wheezing, but much improved  Continue bronchodilators  Bipap discontinued  Weaning IV steroids and likely transition to PO tomorrow  Continue guaifenesin    Pneumonia of both lungs due to infectious organism  Broad-spectrum antibiotics started  initially  Cultures NGTD  CXR stable  Oxygen requirement now weaned to room air  Stop Vanc  Would continue Cefepime for another day and then can transition to oral agent if no new micro evidence      Cardiac/Vascular  NSTEMI (non-ST elevated myocardial infarction)  Initial troponin 2.476, peaked 4.052 and downtrended  Cardiology following  Heparin drip  Chest pain free    ID  * Sepsis with acute hypoxic respiratory failure  Respiratory failure was multifactorial due to COPD, CHF, pneumonia, NSTEMI  Initially required BIPAP, but weaned off AM of 5/9  Initial lactic acid 7.0 -> 2.7 with resuscitation  Blood, urine, sputum cultures NGTD  WBC 25.68, has infiltrate on CXR and dirty urine noted as well  - leukocytosis improving  Vanc/Cefepime initially  - would stop Vanc today given no evidence of MRSA  Reported diarrhea x 2 days, stool studies pending  Oxygenation is currently stable on room air (typically wears 2-3L at home)      Will continue to follow for pulmonary issues.     Murphy Ackerman MD  Pulmonology  '[place] - [facility] ([facility])

## 2023-05-10 NOTE — ASSESSMENT & PLAN NOTE
Patient with acute kidney injury likely due to IVVD/dehydration MARCUS is currently improving. Labs reviewed- Renal function/electrolytes with Estimated Creatinine Clearance: 41.8 mL/min (based on SCr of 1 mg/dL). according to latest data. Monitor urine output and serial BMP and adjust therapy as needed. Avoid nephrotoxins and renally dose meds for GFR listed above.     Resolved

## 2023-05-10 NOTE — SUBJECTIVE & OBJECTIVE
Interval History: See hospital course for today      Review of Systems   Unable to perform ROS: Severe respiratory distress   Objective:     Vital Signs (Most Recent):  Temp: 98 °F (36.7 °C) (05/10/23 0715)  Pulse: 81 (05/10/23 0755)  Resp: 18 (05/10/23 0755)  BP: 130/73 (05/10/23 1026)  SpO2: (!) 94 % (05/10/23 0755) Vital Signs (24h Range):  Temp:  [97.5 °F (36.4 °C)-98.1 °F (36.7 °C)] 98 °F (36.7 °C)  Pulse:  [] 81  Resp:  [18-43] 18  SpO2:  [64 %-99 %] 94 %  BP: (102-131)/(66-86) 130/73  Arterial Line BP: (107-124)/(66-74) 121/70     Weight: 49.5 kg (109 lb 2 oz)  Body mass index is 20.62 kg/m².    Intake/Output Summary (Last 24 hours) at 5/10/2023 1313  Last data filed at 5/10/2023 0600  Gross per 24 hour   Intake 598.86 ml   Output 525 ml   Net 73.86 ml         Physical Exam  Vitals and nursing note reviewed. Exam conducted with a chaperone present (nursing).   Constitutional:       General: She is not in acute distress.     Appearance: She is ill-appearing. She is not toxic-appearing.      Interventions: Face mask in place.   HENT:      Head: Normocephalic and atraumatic.   Cardiovascular:      Rate and Rhythm: Normal rate.   Pulmonary:      Effort: Tachypnea, accessory muscle usage and respiratory distress present.      Breath sounds: Decreased air movement present.   Abdominal:      Palpations: Abdomen is soft.   Genitourinary:     Comments: alvarez  Skin:     General: Skin is warm.      Coloration: Skin is pale.           Significant Labs: All pertinent labs within the past 24 hours have been reviewed.  ABGs:   Recent Labs   Lab 05/08/23  2150 05/09/23  0137 05/09/23  0600   PH 7.126* 7.199* 7.381   PCO2 46.7* 40.5 38.6   HCO3 15.4* 15.8* 22.9*   POCSATURATED 96 97 97   BE -14 -12 -2   PO2 112* 108* 89     CBC:   Recent Labs   Lab 05/08/23  2207 05/09/23  0405 05/10/23  0454   WBC 25.68* 19.98* 21.65*   HGB 16.3* 13.8 12.9   HCT 54.6* 44.0 39.0    264 283     CMP:   Recent Labs   Lab  05/08/23 2207 05/09/23  0405 05/10/23  0454    143 139   K 4.3 3.2* 4.8    107 105   CO2 15* 18* 24   * 204* 158*   BUN 18 22 23   CREATININE 1.8* 1.3 1.0   CALCIUM 9.3 8.1* 8.8   PROT 7.2  --   --    ALBUMIN 3.5  --   --    BILITOT 0.2  --   --    ALKPHOS 121  --   --    AST 86*  --   --    ALT 41  --   --    ANIONGAP 20* 18* 10       Significant Imaging: I have reviewed all pertinent imaging results/findings within the past 24 hours.  CXR: I have reviewed all pertinent results/findings within the past 24 hours and my personal findings are:  pending

## 2023-05-10 NOTE — ASSESSMENT & PLAN NOTE
Respiratory failure was multifactorial due to COPD, CHF, pneumonia, NSTEMI  Initially required BIPAP, but weaned off AM of 5/9  Initial lactic acid 7.0 -> 2.7 with resuscitation  Blood, urine, sputum cultures NGTD  WBC 25.68, has infiltrate on CXR and dirty urine noted as well  - leukocytosis improving  Vanc/Cefepime initially  - would stop Vanc today given no evidence of MRSA  Reported diarrhea x 2 days, stool studies pending  Oxygenation is currently stable on room air (typically wears 2-3L at home)

## 2023-05-10 NOTE — HOSPITAL COURSE
5/9: Weaned from Bipap early morning and stepped down from ICU.  5/10: Room air this morning.  Remains on heparin gtt.

## 2023-05-10 NOTE — PROGRESS NOTES
Pt is currently enrolled in the tobacco trust, SCT# [id number]. Pt does not want an appt with the smoking cessation clinic. Denies need for nicotine patches.

## 2023-05-10 NOTE — NURSING
Dr. Ackerman called to bedside to evaluate patient. Pt had sudden onset tachypnea, tripoping, cyanosis and stated that she could not breathe. Pt placed on bipap, respiratory called. Dr. Alcantar ordered STAT ABG and CXR. Pt restless on bipap but redirectable.

## 2023-05-11 VITALS
TEMPERATURE: 98 F | BODY MASS INDEX: 20.11 KG/M2 | HEIGHT: 61 IN | RESPIRATION RATE: 26 BRPM | OXYGEN SATURATION: 94 % | WEIGHT: 106.5 LBS | SYSTOLIC BLOOD PRESSURE: 107 MMHG | DIASTOLIC BLOOD PRESSURE: 70 MMHG | HEART RATE: 80 BPM

## 2023-05-11 PROBLEM — N17.9 AKI (ACUTE KIDNEY INJURY): Status: RESOLVED | Noted: 2023-05-09 | Resolved: 2023-05-11

## 2023-05-11 LAB
ALBUMIN SERPL BCP-MCNC: 3.3 G/DL (ref 3.5–5.2)
ALP SERPL-CCNC: 67 U/L (ref 55–135)
ALT SERPL W/O P-5'-P-CCNC: 27 U/L (ref 10–44)
ANION GAP SERPL CALC-SCNC: 16 MMOL/L (ref 8–16)
APTT PPP: 40 SEC (ref 21–32)
AST SERPL-CCNC: 28 U/L (ref 10–40)
BASOPHILS # BLD AUTO: 0.03 K/UL (ref 0–0.2)
BASOPHILS NFR BLD: 0.2 % (ref 0–1.9)
BILIRUB DIRECT SERPL-MCNC: 0.2 MG/DL (ref 0.1–0.3)
BILIRUB SERPL-MCNC: 0.4 MG/DL (ref 0.1–1)
BNP SERPL-MCNC: 3155 PG/ML (ref 0–99)
BUN SERPL-MCNC: 38 MG/DL (ref 8–23)
CALCIUM SERPL-MCNC: 8.8 MG/DL (ref 8.7–10.5)
CHLORIDE SERPL-SCNC: 101 MMOL/L (ref 95–110)
CO2 SERPL-SCNC: 22 MMOL/L (ref 23–29)
CREAT SERPL-MCNC: 1.2 MG/DL (ref 0.5–1.4)
DIFFERENTIAL METHOD: ABNORMAL
EOSINOPHIL # BLD AUTO: 0 K/UL (ref 0–0.5)
EOSINOPHIL NFR BLD: 0 % (ref 0–8)
ERYTHROCYTE [DISTWIDTH] IN BLOOD BY AUTOMATED COUNT: 13.2 % (ref 11.5–14.5)
EST. GFR  (NO RACE VARIABLE): 50 ML/MIN/1.73 M^2
GLUCOSE SERPL-MCNC: 166 MG/DL (ref 70–110)
HCT VFR BLD AUTO: 39.6 % (ref 37–48.5)
HGB BLD-MCNC: 12.8 G/DL (ref 12–16)
IMM GRANULOCYTES # BLD AUTO: 0.15 K/UL (ref 0–0.04)
IMM GRANULOCYTES NFR BLD AUTO: 0.8 % (ref 0–0.5)
L PNEUMO AG UR QL IA: NEGATIVE
LYMPHOCYTES # BLD AUTO: 1.2 K/UL (ref 1–4.8)
LYMPHOCYTES NFR BLD: 6.4 % (ref 18–48)
MAGNESIUM SERPL-MCNC: 2.1 MG/DL (ref 1.6–2.6)
MCH RBC QN AUTO: 28.3 PG (ref 27–31)
MCHC RBC AUTO-ENTMCNC: 32.3 G/DL (ref 32–36)
MCV RBC AUTO: 87 FL (ref 82–98)
MONOCYTES # BLD AUTO: 0.9 K/UL (ref 0.3–1)
MONOCYTES NFR BLD: 4.5 % (ref 4–15)
NEUTROPHILS # BLD AUTO: 17 K/UL (ref 1.8–7.7)
NEUTROPHILS NFR BLD: 88.1 % (ref 38–73)
NRBC BLD-RTO: 0 /100 WBC
PLATELET # BLD AUTO: 287 K/UL (ref 150–450)
PMV BLD AUTO: 10.8 FL (ref 9.2–12.9)
POCT GLUCOSE: 130 MG/DL (ref 70–110)
POCT GLUCOSE: 146 MG/DL (ref 70–110)
POCT GLUCOSE: 158 MG/DL (ref 70–110)
POTASSIUM SERPL-SCNC: 4.2 MMOL/L (ref 3.5–5.1)
PROT SERPL-MCNC: 6.1 G/DL (ref 6–8.4)
RBC # BLD AUTO: 4.53 M/UL (ref 4–5.4)
SODIUM SERPL-SCNC: 139 MMOL/L (ref 136–145)
WBC # BLD AUTO: 19.24 K/UL (ref 3.9–12.7)

## 2023-05-11 PROCEDURE — 97530 THERAPEUTIC ACTIVITIES: CPT

## 2023-05-11 PROCEDURE — 99232 PR SUBSEQUENT HOSPITAL CARE,LEVL II: ICD-10-PCS | Mod: ,,, | Performed by: INTERNAL MEDICINE

## 2023-05-11 PROCEDURE — 99900035 HC TECH TIME PER 15 MIN (STAT)

## 2023-05-11 PROCEDURE — 85730 THROMBOPLASTIN TIME PARTIAL: CPT | Performed by: FAMILY MEDICINE

## 2023-05-11 PROCEDURE — 99223 PR INITIAL HOSPITAL CARE,LEVL III: ICD-10-PCS | Mod: ,,, | Performed by: NURSE PRACTITIONER

## 2023-05-11 PROCEDURE — 25000003 PHARM REV CODE 250: Performed by: INTERNAL MEDICINE

## 2023-05-11 PROCEDURE — 99232 SBSQ HOSP IP/OBS MODERATE 35: CPT | Mod: ,,, | Performed by: INTERNAL MEDICINE

## 2023-05-11 PROCEDURE — 99498 ADVNCD CARE PLAN ADDL 30 MIN: CPT | Mod: ,,, | Performed by: NURSE PRACTITIONER

## 2023-05-11 PROCEDURE — 83880 ASSAY OF NATRIURETIC PEPTIDE: CPT | Performed by: FAMILY MEDICINE

## 2023-05-11 PROCEDURE — 25000003 PHARM REV CODE 250: Performed by: FAMILY MEDICINE

## 2023-05-11 PROCEDURE — 99498 PR ADVNCD CARE PLAN ADDL 30 MIN: ICD-10-PCS | Mod: ,,, | Performed by: NURSE PRACTITIONER

## 2023-05-11 PROCEDURE — 80048 BASIC METABOLIC PNL TOTAL CA: CPT | Performed by: NURSE PRACTITIONER

## 2023-05-11 PROCEDURE — 97535 SELF CARE MNGMENT TRAINING: CPT

## 2023-05-11 PROCEDURE — 63600175 PHARM REV CODE 636 W HCPCS: Performed by: FAMILY MEDICINE

## 2023-05-11 PROCEDURE — 99223 1ST HOSP IP/OBS HIGH 75: CPT | Mod: ,,, | Performed by: NURSE PRACTITIONER

## 2023-05-11 PROCEDURE — 99497 PR ADVNCD CARE PLAN 30 MIN: ICD-10-PCS | Mod: 25,ICN,, | Performed by: NURSE PRACTITIONER

## 2023-05-11 PROCEDURE — 25000242 PHARM REV CODE 250 ALT 637 W/ HCPCS: Performed by: FAMILY MEDICINE

## 2023-05-11 PROCEDURE — 80076 HEPATIC FUNCTION PANEL: CPT | Performed by: FAMILY MEDICINE

## 2023-05-11 PROCEDURE — 63600175 PHARM REV CODE 636 W HCPCS: Performed by: INTERNAL MEDICINE

## 2023-05-11 PROCEDURE — 85025 COMPLETE CBC W/AUTO DIFF WBC: CPT | Performed by: NURSE PRACTITIONER

## 2023-05-11 PROCEDURE — 25000003 PHARM REV CODE 250: Performed by: NURSE PRACTITIONER

## 2023-05-11 PROCEDURE — 99497 ADVNCD CARE PLAN 30 MIN: CPT | Mod: 25,ICN,, | Performed by: NURSE PRACTITIONER

## 2023-05-11 PROCEDURE — 83735 ASSAY OF MAGNESIUM: CPT | Performed by: NURSE PRACTITIONER

## 2023-05-11 PROCEDURE — 94660 CPAP INITIATION&MGMT: CPT

## 2023-05-11 PROCEDURE — 94640 AIRWAY INHALATION TREATMENT: CPT

## 2023-05-11 RX ORDER — FUROSEMIDE 20 MG/1
40 TABLET ORAL DAILY
Qty: 5 TABLET | Refills: 0 | Status: SHIPPED | OUTPATIENT
Start: 2023-05-11

## 2023-05-11 RX ORDER — SODIUM BICARBONATE 650 MG/1
650 TABLET ORAL 3 TIMES DAILY
Qty: 6 TABLET | Refills: 0 | Status: SHIPPED | OUTPATIENT
Start: 2023-05-11

## 2023-05-11 RX ORDER — ALPRAZOLAM 0.25 MG/1
0.25 TABLET ORAL 3 TIMES DAILY PRN
Qty: 3 TABLET | Refills: 0 | Status: SHIPPED | OUTPATIENT
Start: 2023-05-11

## 2023-05-11 RX ORDER — PREDNISONE 20 MG/1
40 TABLET ORAL DAILY
Status: DISCONTINUED | OUTPATIENT
Start: 2023-05-11 | End: 2023-05-11 | Stop reason: HOSPADM

## 2023-05-11 RX ORDER — METOPROLOL TARTRATE 25 MG/1
25 TABLET, FILM COATED ORAL 2 TIMES DAILY
Qty: 3 TABLET | Refills: 0 | Status: SHIPPED | OUTPATIENT
Start: 2023-05-11

## 2023-05-11 RX ORDER — PREDNISONE 20 MG/1
40 TABLET ORAL DAILY
Qty: 3 TABLET | Refills: 0 | Status: SHIPPED | OUTPATIENT
Start: 2023-05-12

## 2023-05-11 RX ORDER — AMOXICILLIN AND CLAVULANATE POTASSIUM 875; 125 MG/1; MG/1
1 TABLET, FILM COATED ORAL 2 TIMES DAILY
Qty: 6 TABLET | Refills: 0 | Status: SHIPPED | OUTPATIENT
Start: 2023-05-11 | End: 2023-05-14

## 2023-05-11 RX ORDER — CLOPIDOGREL BISULFATE 75 MG/1
75 TABLET ORAL DAILY
Status: DISCONTINUED | OUTPATIENT
Start: 2023-05-11 | End: 2023-05-11 | Stop reason: HOSPADM

## 2023-05-11 RX ADMIN — IPRATROPIUM BROMIDE AND ALBUTEROL SULFATE 3 ML: .5; 3 SOLUTION RESPIRATORY (INHALATION) at 02:05

## 2023-05-11 RX ADMIN — CLOPIDOGREL BISULFATE 75 MG: 75 TABLET ORAL at 09:05

## 2023-05-11 RX ADMIN — CEFEPIME 2 G: 2 INJECTION, POWDER, FOR SOLUTION INTRAVENOUS at 03:05

## 2023-05-11 RX ADMIN — IPRATROPIUM BROMIDE AND ALBUTEROL SULFATE 3 ML: .5; 3 SOLUTION RESPIRATORY (INHALATION) at 07:05

## 2023-05-11 RX ADMIN — PANTOPRAZOLE SODIUM 40 MG: 40 TABLET, DELAYED RELEASE ORAL at 08:05

## 2023-05-11 RX ADMIN — METHYLPREDNISOLONE SODIUM SUCCINATE 40 MG: 40 INJECTION, POWDER, FOR SOLUTION INTRAMUSCULAR; INTRAVENOUS at 08:05

## 2023-05-11 RX ADMIN — MUPIROCIN: 20 OINTMENT TOPICAL at 08:05

## 2023-05-11 RX ADMIN — PRAVASTATIN SODIUM 40 MG: 20 TABLET ORAL at 08:05

## 2023-05-11 RX ADMIN — GUAIFENESIN 600 MG: 600 TABLET, EXTENDED RELEASE ORAL at 08:05

## 2023-05-11 RX ADMIN — SODIUM BICARBONATE 650 MG TABLET 650 MG: at 08:05

## 2023-05-11 RX ADMIN — METOPROLOL TARTRATE 25 MG: 25 TABLET, FILM COATED ORAL at 08:05

## 2023-05-11 RX ADMIN — PREDNISONE 40 MG: 20 TABLET ORAL at 09:05

## 2023-05-11 RX ADMIN — ASPIRIN 81 MG: 81 TABLET, CHEWABLE ORAL at 08:05

## 2023-05-11 RX ADMIN — FUROSEMIDE 40 MG: 10 INJECTION, SOLUTION INTRAMUSCULAR; INTRAVENOUS at 08:05

## 2023-05-11 NOTE — PLAN OF CARE
Problem: Fall Injury Risk  Goal: Absence of Fall and Fall-Related Injury  Outcome: Ongoing, Progressing  Intervention: Identify and Manage Contributors  Flowsheets (Taken 5/11/2023 0343)  Self-Care Promotion:   safe use of adaptive equipment encouraged   independence encouraged  Medication Review/Management:   medications reviewed   high-risk medications identified  Intervention: Promote Injury-Free Environment  Flowsheets (Taken 5/11/2023 0343)  Safety Promotion/Fall Prevention:   bed alarm set   side rails raised x 3   instructed to call staff for mobility   Fall Risk signage in place   medications reviewed

## 2023-05-11 NOTE — CARE UPDATE
Palliative care consult placed to help address goals of care, as patient has continually stated that she does not want to wear BIPAP. RT was able to get her to wear it for the past several hours, however patient has communicated that she does not feel like she needs it. She has been educated multiple times. She remains full code at this time.

## 2023-05-11 NOTE — ASSESSMENT & PLAN NOTE
Respiratory failure was multifactorial due to COPD, CHF, pneumonia, NSTEMI  Initially required BIPAP, but weaned off AM of 5/9  Initial lactic acid 7.0 -> 2.7 with resuscitation  Blood, urine, sputum cultures NGTD  WBC 25.68, has infiltrate on CXR and dirty urine noted as well  - leukocytosis improving  Vanc/Cefepime initially  - Vanc stopped 5/10  - can change Cefepime to Levaquin when ready for discharge  Reported diarrhea x 2 days, stool studies pending  Oxygenation is currently stable on room air (typically wears 2-3L at home)

## 2023-05-11 NOTE — CONSULTS
Advance Care Planning    Consult Note  Palliative Medicine      Consult Requested By: Renetta Alcantar MD  Reason for Consult: Goals of care and Advance care planning    SUBJECTIVE:     History of Present Illness:  Margie Zamudio is a 67 y.o. year old female with hypertension, hyperlipidemia, cva, copd, congestive heart failure, chronic pain, narcotic dependence, gerd, anxiety and depression. On arrival, patient febrile, tachycardic, and tachypneic. Initial workup revealed leukocytosis, acute kidney injury, hyperglycemia, elevated ast. Lactate elevated. Cardiac markers also elevated. Uds positive for marijuana. Urinalysis with rare bacteria. Chest x-ray with interstitial opacities. Ct head wo contrast with atrophy, chronic changes and old infarcts. Patient was seen by cardiology due to NSTEMI in which she had TTE which revealed LVEF 30%, Cardiology discussed cardiac cath in which patient did not want. Overnight, patient had respiratroy distress and was refusing bipap in which she stated she did not need to wear it. Palliative medicine consulted for goals of care and advance care planning.     PM team attempted to call patient's next of kin which is her grandson Adarsh as she had one child a daughter who is . Adarsh's number was not listed in chart. In this setting, nurse Cheryl asked patient who would she like her to contact to help with making medical decisions and patient stated Raymundo Castillo Who is her significant other's son (patient lives with significant other Raymundo and his son Raymundo Arnold). In this setting Raymundo Castillo Was contacted and agreed to come to hospital today with his father for a family meeting. He noted patient has a sister but that he did not have contact information for her and none listed after reviewing chart. Cheryl also called the number provided for [facility] by Raymundo Castillo And no answer in which she left a voice mail and Adarsh has not contacted PM team. In this setting, palliative medicine team  including myself, nurse Cheryl, and  Eunice met with patient, Raymundo, and Raymundo .     Upon examination, patient sitting up in the chair in no acute distress tolerating supplemental O2. She is alert, oriented and answering questions appropriately. After medical updates from primary team, we discussed patient's medical diagnoses including life limiting illnesses COPD and newly diagnosed CHF with EF 30%. They noted at home patient with worsening respiratory symptoms of SOB, dyspnea with minimal exertion. We then discussed goals of care in which patient and family noted she wants to avoid hospital, be at home with loved ones, have pain/symptoms management, and focus on comfort and quality of life for whatever time she has left here on earth. She noted she wants to be at home and leave the hospital today and that when the Lord calls her home she wants to die naturally and peacefully at home. We then discussed code status: full code vs DNR/I along with risks, benefits, and alternatives. She and family elected DNR/I code status. Patient and family elected to enroll in hospice care, [facility] chosen due to close proximity of inpatient unit to their home. Info visit today and likely to d/c home with hospice today. Patient noted she would update any additional family of her decisions. She noted that her family was present that she wanted for help with medical decision making which were her significant other and his son who she lives with. Primary team updated, defer [place]POST to hospice agency.     Past Medical History:   Diagnosis Date    Chronic pain     COPD (chronic obstructive pulmonary disease)     GERD (gastroesophageal reflux disease)     HTN (hypertension)     Kidney stone     Narcotic dependence     Stroke      Past Surgical History:   Procedure Laterality Date    CAROTID ENDARTERECTOMY      CHOLECYSTECTOMY      FACIAL RECONSTRUCTION SURGERY      HYSTERECTOMY      TONSILLECTOMY       Family  History   Problem Relation Age of Onset    Heart disease Mother     Heart disease Father     Kidney cancer Brother        Social History     Socioeconomic History    Marital status:    Tobacco Use    Smoking status: Every Day     Packs/day: 0.50     Types: Cigarettes    Smokeless tobacco: Never   Substance and Sexual Activity    Alcohol use: Never    Drug use: Never      Review of patient's allergies indicates:   Allergen Reactions    Morphine Itching    Fentanyl Itching    Hydromorphone Itching       Medications:    Current Facility-Administered Medications:     acetaminophen tablet 650 mg, 650 mg, Oral, Q6H PRN, Mayra Stark NP, 650 mg at 05/10/23 1950    albuterol-ipratropium 2.5 mg-0.5 mg/3 mL nebulizer solution 3 mL, 3 mL, Nebulization, Q6H WAKE, Renetta Alcantar MD, 3 mL at 05/11/23 0726    aspirin chewable tablet 81 mg, 81 mg, Oral, Daily, Doyle Diallo MD, 81 mg at 05/11/23 0838    ceFEPIme (MAXIPIME) 2 g in dextrose 5 % in water (D5W) 5 % 50 mL IVPB (MB+), 2 g, Intravenous, Q12H, Murphy Ackerman MD, Stopped at 05/11/23 0336    clopidogreL tablet 75 mg, 75 mg, Oral, Daily, Doyle Diallo MD, 75 mg at 05/11/23 0953    dextrose 10% bolus 125 mL 125 mL, 12.5 g, Intravenous, PRN, Mayra Stark NP    dextrose 10% bolus 250 mL 250 mL, 25 g, Intravenous, PRN, Mayra Stark NP    furosemide injection 40 mg, 40 mg, Intravenous, Daily, Renetta Alcantar MD, 40 mg at 05/11/23 0838    glucagon (human recombinant) injection 1 mg, 1 mg, Intramuscular, PRN, Mayra Stark NP    guaiFENesin 12 hr tablet 600 mg, 600 mg, Oral, BID, Mayra Stark NP, 600 mg at 05/11/23 0838    insulin aspart U-100 pen 1-10 Units, 1-10 Units, Subcutaneous, Q4H PRN, Mayra Stark NP    melatonin tablet 6 mg, 6 mg, Oral, Nightly PRN, Mayra Stark NP, 6 mg at 05/10/23 1950    metoprolol tartrate (LOPRESSOR) tablet 25 mg, 25 mg, Oral, BID, Doyle Diallo MD, 25 mg at 05/11/23 0839    mupirocin 2 % ointment, ,  Nasal, BID, Murphy Ackerman MD, Given at 05/11/23 0838    ondansetron injection 4 mg, 4 mg, Intravenous, Q8H PRN, Mayra Stark NP, 4 mg at 05/09/23 0300    pantoprazole EC tablet 40 mg, 40 mg, Oral, Daily, Mayra Stark NP, 40 mg at 05/11/23 0839    pneumoc 20-madi conj-dip cr(PF) (PREVNAR-20 (PF)) injection Syrg 0.5 mL, 0.5 mL, Intramuscular, vaccine x 1 dose, Murphy Ackerman MD    pravastatin tablet 40 mg, 40 mg, Oral, Daily, Mayra Stark NP, 40 mg at 05/11/23 0838    predniSONE tablet 40 mg, 40 mg, Oral, Daily, Murpyh Ackerman MD, 40 mg at 05/11/23 0953    sodium bicarbonate tablet 650 mg, 650 mg, Oral, TID, Renetta Alcantar MD, 650 mg at 05/11/23 0838    sodium chloride 0.9% flush 10 mL, 10 mL, Intravenous, PRN, Mayra Stark NP    sodium chloride 0.9% flush 10 mL, 10 mL, Intravenous, PRN, Mayra Stark NP    ROS:  Review of Systems   Constitutional:  Positive for activity change, appetite change and fatigue.   HENT:  Negative for congestion.    Respiratory:  Positive for cough and shortness of breath.    Cardiovascular:  Negative for chest pain and palpitations.   Gastrointestinal:  Negative for abdominal pain, nausea and vomiting.   Musculoskeletal:  Positive for arthralgias.   Skin:  Negative for color change.   Neurological:  Positive for weakness. Negative for dizziness.        Generalized weakness   Psychiatric/Behavioral:  The patient is nervous/anxious.      OBJECTIVE:     Physical Exam:  Vitals: Temp: 97.5 °F (36.4 °C) (05/11/23 0730)  Pulse: 85 (05/11/23 0726)  Resp: (!) 21 (05/11/23 0726)  BP: 120/78 (05/11/23 0715)  SpO2: (!) 94 % (05/11/23 0726)    Physical Exam  Vitals and nursing note reviewed.   Constitutional:       Appearance: She is ill-appearing.   HENT:      Head: Normocephalic.      Nose: Nose normal.      Mouth/Throat:      Mouth: Mucous membranes are dry.   Eyes:      General:         Right eye: No discharge.         Left eye: No discharge.      Pupils: Pupils are equal,  round, and reactive to light.   Cardiovascular:      Rate and Rhythm: Normal rate.   Pulmonary:      Effort: No respiratory distress.      Comments: Tolerating supplemental O2  Abdominal:      Palpations: Abdomen is soft.   Musculoskeletal:      Cervical back: Normal range of motion.   Skin:     General: Skin is warm and dry.      Coloration: Skin is pale.   Neurological:      Mental Status: She is alert and oriented to person, place, and time.   Psychiatric:         Mood and Affect: Mood normal.         Behavior: Behavior normal.         Thought Content: Thought content normal.         Judgment: Judgment normal.         Review of Symptoms      Symptom Assessment (ESAS 0-10 Scale)  Pain:  0  Dyspnea:  0  Anxiety:  0  Nausea:  0  Depression:  0  Anorexia:  0  Fatigue:  0  Insomnia:  0  Restlessness:  0  Agitation:  0       Anxiety:  Is nervous/anxious    ECOG Performance Status ndGndrndanddndend:nd nd2nd Living Arrangements:  Lives with family    Psychosocial/Cultural:   See Palliative Psychosocial Note: No  Lives with significant other and his son Rashaun Arnold. She noted she wants Raymundojonny Castillo To help her make medical decisions. Patient had one daughter who is  and grandson Adarsh who we are unable to contact.   **Primary  to Follow**  Palliative Care  Consult: No    Advance Directives:   [place]POST: No (Defer to hospice agency)    Do Not Resuscitate Status: Yes      Decision Making:  Patient answered questions and Family answered questions  Goals of Care: The patient and family endorses that what is most important right now is to focus on spending time at home, avoiding the hospital, remaining as independent as possible, symptom/pain control, quality of life, even if it means sacrificing a little time, and comfort and QOL     Accordingly, we have decided that the best plan to meet the patient's goals includes enrolling in hospice care    Labs:  WBC   Date Value Ref Range Status   2023 19.24  (H) 3.90 - 12.70 K/uL Final       Hemoglobin   Date Value Ref Range Status   2023 12.8 12.0 - 16.0 g/dL Final       Hematocrit   Date Value Ref Range Status   2023 39.6 37.0 - 48.5 % Final       MCV   Date Value Ref Range Status   2023 87 82 - 98 fL Final       Platelets   Date Value Ref Range Status   2023 287 150 - 450 K/uL Final       BMP  Lab Results   Component Value Date     2023    K 4.2 2023     2023    CO2 22 (L) 2023    BUN 38 (H) 2023    CREATININE 1.2 2023    CALCIUM 8.8 2023    ANIONGAP 16 2023    EGFRNORACEVR 50 (A) 2023       Lab Results   Component Value Date    AST 28 2023    ALKPHOS 67 2023    BILITOT 0.4 2023       Albumin   Date Value Ref Range Status   2023 3.3 (L) 3.5 - 5.2 g/dL Final       Radiology:I have reviewed all pertinent imaging results/findings within the past 24 hours.      ASSESSMENT   Margie Zamudio is a 67 y.o. year old female with hypertension, hyperlipidemia, cva, copd, congestive heart failure, chronic pain, narcotic dependence, gerd, anxiety and depression. On arrival, patient febrile, tachycardic, and tachypneic. Initial workup revealed leukocytosis, acute kidney injury, hyperglycemia, elevated ast. Lactate elevated. Cardiac markers also elevated. Uds positive for marijuana. Urinalysis with rare bacteria. Chest x-ray with interstitial opacities. Ct head wo contrast with atrophy, chronic changes and old infarcts. Patient was seen by cardiology due to NSTEMI in which she had TTE which revealed LVEF 30%. Overnight, patient had respiratroy distress and was refusing bipap in which she stated she did not need to wear it. Palliative medicine consulted for goals of care and advance care planning.     PM team attempted to call patient's next of kin which is her grandson Adarsh as she had one child a daughter who is . Adarsh's number was not listed in chart. In this  setting, nurse Luna asked patient who would she like her to contact to help with making medical decisions and patient stated Raymundo Castillo Who is her significant other's son (patient lives with significant other Rayumndo and his son Raymundo Arnold). In this setting Raymundo Castillo Was contacted and agreed to come to hospital today with his father for a family meeting. He noted patient has a sister but that he did not have contact information for her and none listed after reviewing chart. Cheryl also called the number provided for [facility] by Raymundo ArnoldCorby And no answer in which she left a voice mail and Adarsh has not contacted PM team. In this setting, palliative medicine team including myself, nurse Cheryl, and  Eunice met with patient, Raymundo, and Raymundo Arnold.     PLAN   **COPD exacerbation  -Pulmonology following   -Transitioned to po steroids, weaned to NC in which she is tolerating bipap was discontinued. ABX on board and breathing treatments on board.     **NSTEMI  **Acute on chronic combined systolic and diastolic heart failure  -Cardiology following  -TTE revealed LVEF 30%, cards discussed cardiac cath in which patient did not want in which medical therapy was recommended.     **Encounter for Palliative Care  -Code status: DNR/I, defer [place]POST to hospice agency.   -HCPOA: Patient stated if she could not make medical decisions for herself or to assist her with medical decision making, she noted Raymundo Castillo Who is the son of her significant other in which she lives with them both.   -GOC: Family meeting with patient, Raymundo, and Raymundo Arnold. Focus on spending time at home, avoiding the hospital, remaining as independent as possible, symptom/pain control, quality of life, even if it means sacrificing a little time, and comfort and QOL and elected to enroll in hospice care. [facility] chosen due to close proximity of inpatient unit to their home. Info visit today and patiently likely to d/c home today with hospice.   -See  HPI for further details      Discussed case and visit details with Dr. Alcantar      Thank you for allowing Palliative Medicine to be involved in the care of Margie Zamudio.    I spent face to face time and non-face to face time preparing to see the patient (eg, review of tests), Obtaining and/or reviewing separately obtained history, Documenting clinical information in the electronic or other health record, Independently interpreting results and communicating results to the patient/family/caregiver, or Care coordination.     > 46 minutes spent in discussing ACP    Kylie Neal NP  Palliative Medicine

## 2023-05-11 NOTE — CARE UPDATE
"Advance Care Planning   [facility] - Intensive Care ([facility])  Palliative Care RN      Patient Name: Margie Zamudio  MRN: [id number]  Admission Date: 5/8/2023  Hospital Length of Stay: 2 days  Code Status: Full Code   Attending Provider: Renetta Alcantar MD  Palliative Care Provider: Kylie Neal NP  Primary Care Physician: Luis Mcmullen MD  Principal Problem:Sepsis with acute hypoxic respiratory failure    Met with patient to introduce Palliative Medicine. Ms. Hanson was sitting up in chair and stated that she is "ready to go home". She initially stated she is , but then stated she has been with Raymundo Leon Negro for 5 years. When asked who we should call to participate in her medical care, patient stated, "Raymundo Arnold" who she referred to as her son, but is actually Raymundo Negro her boyfriend's son. Contacted Raymundo who stated that he is patient's boyfriend's son and they live in a rental mobile home in [place], [place].  Raymundo Arnold is primary caregiver for patient. Patient's only daughter passed and she has one grandson named Adarsh [phone] who per Raymundo has seen patient twice in the past 2 months. No advanced directives per patient and Raymundo Jr. Patient also 2 sisters (Trista [phone] and unsure other sister's name) as well as a niece named Viki. Attempted to contact Adarsh and had to leave a voicemail. Plan for family meeting with PM provider when Raymundo Negro and Raymundo Arnold arrive. Updated attending [facility] MD and bedside RN.       Cheryl Almonte RN-[facility], Palliative Medicine   [phone]   " Detail Level: Simple Instructions: This plan will send the code FBSD to the PM system.  DO NOT or CHANGE the price. Price (Do Not Change): 0.00

## 2023-05-11 NOTE — ASSESSMENT & PLAN NOTE
Broad-spectrum antibiotics started initially  Cultures NGTD  CXR stable  Oxygen requirement up to 2L at times, but on room air this morning  Stopped Vanc 5/10  Would continue Cefepime for now, but can transition to Levaquin when ready for discharge to complete a 7 day course

## 2023-05-11 NOTE — PLAN OF CARE
05/11/23 1604   Final Note   Assessment Type Final Discharge Note   Anticipated Discharge Disposition [facility] Resources/Appts/Education Provided Post-Acute resouces added to AVS   Post-Acute Status   Post-Acute Authorization Hospice   Hospice Status Set-up Complete/Auth obtained   Discharge Delays None known at this time     DC disposition: [facility]   Family notified: at bedside   Transportation: private vehicle       KAELYN Nelson

## 2023-05-11 NOTE — DISCHARGE SUMMARY
[facility] - [facility] ([facility])  [facility] Medicine  Discharge Summary      Patient Name: Margie Zamudio  MRN: [id number]  JENNIE: [id number]  Patient Class: IP- Inpatient  Admission Date: 5/8/2023  Hospital Length of Stay: 2 days  Discharge Date and Time:  05/11/2023 3:49 PM  Attending Physician: Renetta Alcantar MD   Discharging Provider: Renetta Alcantar MD  Primary Care Provider: Luis Mcmullen MD    Primary Care Team: Networked reference to record PCT     HPI:   67F PMH hypertension, hyperlipidemia, cva, copd, congestive heart failure, chronic pain, narcotic dependence, gerd, anxiety and depression, admitted for copdx. Associated with nausea, vomiting, cough, and dyspnea. Denies chest pain. Received solumedrol and duonebs en route.    Patient febrile, tachycardic, and tachypneic. Initial workup revealed leukocytosis, acute kidney injury, hyperglycemia, elevated ast. Lactate elevated. Cardiac markers also elevated. Uds positive for marijuana. Urinalysis with rare bacteria. Chest x-ray with interstitial opacities. Ct head wo contrast with atrophy, chronic changes and old infarcts.    Blood culture negative growth to date.     Cardiology consulted and recommended medical management. Echocardiogram reviewed with 30% ejection fraction.  Us carotid bilateral with significant tissue lesion/plaque with prior [facility] carotid endarterectomy.    Patient initially required continuous nippv warranting icu admission. Patient weaned to supplemental oxygen with systemic steroids and intravenous antibiotic(s).    Hospital medicine consulted for transfer of care.     Patient reports improvement in symptoms. States inciting event was panic attack from stress. Denies dyspnea, chest pain. +Hunger. Wanting to go home. Primary pulmonologist is Dr. Beltre. She reports having ran out of inhaler medication(s).         * No surgery found *      Hospital Course:   5/11  Palliative consulted. Patient elected home with hospice. Consents signed for  hospice.    Patient transitioned to po medication(s) for discharge. Prescriptions delivered to bedside prior to discharge per hospice recommendations.    Patient seen and evaluated by me. Patient was determined to be suitable for d/c. Patient deemed stable for discharge to home with hospice.         Goals of Care Treatment Preferences:  Code Status: DNR          What is most important right now is to focus on spending time at home, avoiding the hospital, remaining as independent as possible, symptom/pain control, quality of life, even if it means sacrificing a little time, comfort and QOL .  Accordingly, we have decided that the best plan to meet the patient's goals includes enrolling in hospice care.      Consults:   Consults (From admission, onward)        Status Ordering Provider     Inpatient consult to Social Work  Once        Provider:  (Not yet assigned)    Completed KAROLINA SETH     Inpatient consult to Palliative Care  Once        Provider:  (Not yet assigned)    Completed AURELIA FIGUEREDO     Inpatient consult to Vascular Surgery  Once        Provider:  (Not yet assigned)    Acknowledged RENETTA ALCANTAR     Inpatient consult to Hospitalist  Once        Provider:  Renetta Alcantar MD    Acknowledged MUNIR SWAN     Inpatient consult to Heart Failure Nurse  Once        Provider:  (Not yet assigned)    Acknowledged AURELIA FIGUEREDO     Inpatient consult to Cardiology  Once        Provider:  Doyle Diallo MD    Completed AURELIA FIGUEREDO     Inpatient consult to Social Work/Case Management  Once        Provider:  (Not yet assigned)    Completed AURELIA FIGUEREDO     Inpatient consult to Registered Dietitian/Nutritionist  Once        Provider:  (Not yet assigned)    Completed AURELIA FIGUEREDO          No new Assessment & Plan notes have been filed under this hospital service since the last note was generated.  Service: Hospital Medicine    Final Active Diagnoses:    Diagnosis Date Noted POA    PRINCIPAL  PROBLEM:  Sepsis with acute hypoxic respiratory failure [A41.9, R65.20, J96.01] 05/09/2023 Yes    Acute on chronic combined systolic and diastolic congestive heart failure [I50.43] 05/10/2023 Yes    Pneumonia of both lungs due to infectious organism [J18.9] 05/09/2023 Yes    COPD exacerbation [J44.1] 05/09/2023 Yes    Hyperglycemia [R73.9] 05/09/2023 Yes    NSTEMI (non-ST elevated myocardial infarction) [I21.4] 05/09/2023 Yes    Metabolic acidosis [E87.20] 05/09/2023 Yes    Acute cystitis with hematuria [N30.01] 05/09/2023 Yes    Narcotic dependence [F11.20] 05/09/2023 Yes    Dyslipidemia [E78.5] 05/09/2023 Yes    History of carotid endarterectomy [Z98.890] 05/09/2023 Not Applicable      Problems Resolved During this Admission:    Diagnosis Date Noted Date Resolved POA    MARCUS (acute kidney injury) [N17.9] 05/09/2023 05/11/2023 Yes       Discharged Condition: stable    Disposition:     Follow Up:    Patient Instructions:   No discharge procedures on file.    Significant Diagnostic Studies: Labs:   BMP:   Recent Labs   Lab 05/10/23  0454 05/11/23  0425   * 166*    139   K 4.8 4.2    101   CO2 24 22*   BUN 23 38*   CREATININE 1.0 1.2   CALCIUM 8.8 8.8   MG 1.9 2.1   , CMP   Recent Labs   Lab 05/10/23  0454 05/11/23  0425    139   K 4.8 4.2    101   CO2 24 22*   * 166*   BUN 23 38*   CREATININE 1.0 1.2   CALCIUM 8.8 8.8   PROT  --  6.1   ALBUMIN  --  3.3*   BILITOT  --  0.4   ALKPHOS  --  67   AST  --  28   ALT  --  27   ANIONGAP 10 16   , CBC   Recent Labs   Lab 05/10/23  0454 05/11/23  0425   WBC 21.65* 19.24*   HGB 12.9 12.8   HCT 39.0 39.6    287   , INR   Lab Results   Component Value Date    INR 1.4 (H) 05/09/2023   , Lipid Panel   Lab Results   Component Value Date    CHOL 147 05/09/2023    HDL 32 (L) 05/09/2023    LDLCALC 95.0 05/09/2023    TRIG 100 05/09/2023    CHOLHDL 21.8 05/09/2023   , Troponin   Recent Labs   Lab 05/09/23  0817 05/09/23  1215  05/09/23  1659   TROPONINI 4.052* 3.526* 3.407*   , A1C:   Recent Labs   Lab 05/09/23  0139   HGBA1C 5.4    and All labs within the past 24 hours have been reviewed  Microbiology:   Blood Culture   Lab Results   Component Value Date    LABBLOO No Growth to date 05/09/2023    LABBLOO No Growth to date 05/09/2023   , Sputum Culture No results found for: GSRESP, RESPIRATORYC and Urine Culture    Lab Results   Component Value Date    LABURIN No growth 05/09/2023     Radiology: X-Ray: CXR: portable  CT scan: CT head wo contrast  Ultrasound: carotid bilateral   Cardiac Graphics: Echocardiogram:   Transthoracic echo (TTE) complete (Cupid Only):   Results for orders placed or performed during the hospital encounter of 05/08/23   Echo   Result Value Ref Range    BSA 1.45 m2    TDI SEPTAL 0.10 m/s    LV LATERAL E/E' RATIO 9.25 m/s    LV SEPTAL E/E' RATIO 7.40 m/s    LA WIDTH 3.10 cm    IVC diameter 1.53 cm    Left Ventricular Outflow Tract Mean Velocity 0.75 cm/s    Left Ventricular Outflow Tract Mean Gradient 2.34 mmHg    TV mean gradient 22 mmHg    TDI LATERAL 0.08 m/s    LVIDd 3.92 3.5 - 6.0 cm    IVS 1.12 (A) 0.6 - 1.1 cm    Posterior Wall 1.03 0.6 - 1.1 cm    Ao root annulus 2.81 cm    LVIDs 3.53 2.1 - 4.0 cm    FS 10 28 - 44 %    LA volume 32.95 cm3    STJ 3.14 cm    Ascending aorta 2.82 cm    LV mass 136.56 g    LA size 3.08 cm    TAPSE 1.40 cm    Left Ventricle Relative Wall Thickness 0.53 cm    AV mean gradient 4 mmHg    AV valve area 2.55 cm2    AV Velocity Ratio 0.76     AV index (prosthetic) 0.85     MV valve area p 1/2 method 4.99 cm2    E/A ratio 0.93     Mean e' 0.09 m/s    E wave deceleration time 152.10 msec    IVRT 68.51 msec    LVOT diameter 1.95 cm    LVOT area 3.0 cm2    LVOT peak jaye 0.93 m/s    LVOT peak VTI 16.80 cm    Ao peak jaye 1.22 m/s    Ao VTI 19.7 cm    RVOT peak jaye 0.75 m/s    RVOT peak VTI 10.9 cm    Mr max jaye 2.92 m/s    LVOT stroke volume 50.15 cm3    AV peak gradient 6 mmHg    PV mean  gradient 1.61 mmHg    E/E' ratio 8.22 m/s    MV Peak E Nathan 0.74 m/s    TR Max Nathan 2.63 m/s    MV stenosis pressure 1/2 time 44.11 ms    MV Peak A Nathan 0.80 m/s    LV Systolic Volume 51.76 mL    LV Systolic Volume Index 35.7 mL/m2    LV Diastolic Volume 66.68 mL    LV Diastolic Volume Index 45.99 mL/m2    LA Volume Index 22.7 mL/m2    LV Mass Index 94 g/m2    RA Major Axis 3.18 cm    Left Atrium Minor Axis 4.09 cm    Left Atrium Major Axis 4.03 cm    Triscuspid Valve Regurgitation Peak Gradient 28 mmHg    RA Width 2.10 cm    EF 30 %    Narrative    · Concentric remodeling and moderately decreased systolic function.  · The estimated ejection fraction is 30%.  · Grade I left ventricular diastolic dysfunction.  · Normal right ventricular size with normal right ventricular systolic   function.          Pending Diagnostic Studies:     Procedure Component Value Units Date/Time    EKG 12-LEAD [[id number]] Collected: 05/08/23 2122    Order Status: Sent Lab Status: In process Updated: 05/09/23 0713    Narrative:      Test Reason : R77.8,    Vent. Rate : 132 BPM     Atrial Rate : 132 BPM     P-R Int : 150 ms          QRS Dur : 114 ms      QT Int : 334 ms       P-R-T Axes : 084 064 097 degrees     QTc Int : 494 ms    Sinus tachycardia  Anteroseptal infarct , possibly acute  Lateral injury pattern    ACUTE MI / STEMI    Abnormal ECG  No previous ECGs available    Referred By: [email or web address]   SELF           Confirmed By:     MRA Neck without contrast [[id number]]     Order Status: Sent Lab Status: No result     X-Ray Abdomen AP 1 View [[id number]]     Order Status: Sent Lab Status: No result          Medications:  Reconciled Home Medications:      Medication List      START taking these medications    amoxicillin-clavulanate 875-125mg 875-125 mg per tablet  Commonly known as: AUGMENTIN  Take 1 tablet by mouth 2 (two) times daily. for 3 days     furosemide 20 MG tablet  Commonly known as: LASIX  Take 2 tablets (40 mg total) by mouth once  daily.     metoprolol tartrate 25 MG tablet  Commonly known as: LOPRESSOR  Take 1 tablet (25 mg total) by mouth 2 (two) times daily.     predniSONE 20 MG tablet  Commonly known as: DELTASONE  Take 2 tablets (40 mg total) by mouth once daily.  Start taking on: May 12, 2023     sodium bicarbonate 650 MG tablet  Take 1 tablet (650 mg total) by mouth 3 (three) times daily.        CHANGE how you take these medications    ALPRAZolam 0.25 MG tablet  Commonly known as: XANAX  Take 1 tablet (0.25 mg total) by mouth 3 (three) times daily as needed for Anxiety.  What changed: See the new instructions.        CONTINUE taking these medications    * albuterol 2.5 mg /3 mL (0.083 %) nebulizer solution  Commonly known as: PROVENTIL  3 mLs.     * PROVENTIL HFA 90 mcg/actuation inhaler  Generic drug: albuterol  Proventil HFA 90 mcg/actuation aerosol inhaler   Inhale 2 puffs every 4 hours by inhalation route as needed.     butalbitaL-acetaminophen  mg Tab  butalbital 50 mg-acetaminophen 325 mg tablet   Take 1 tablet every 4 hours by oral route.     clopidogreL 75 mg tablet  Commonly known as: PLAVIX  clopidogrel 75 mg tablet   TAKE 1 TABLET BY MOUTH ONCE DAILY     DULoxetine 60 MG capsule  Commonly known as: CYMBALTA  duloxetine 60 mg capsule,delayed release   Take 1 capsule twice a day by oral route.     folic acid 1 MG tablet  Commonly known as: FOLVITE  folic acid 1 mg tablet   TAKE 1 TABLET BY MOUTH ONCE DAILY     gabapentin 100 MG capsule  Commonly known as: NEURONTIN  gabapentin 100 mg capsule   Take 1 capsule 3 times a day by oral route.     hydrOXYzine pamoate 25 MG Cap  Commonly known as: VISTARIL  hydroxyzine pamoate 25 mg capsule   TAKE ONE CAPSULE BY MOUTH FOUR TIMES DAILY AS NEEDED     LONHALA MAGNAIR STARTER 25 mcg/mL Nebu  Generic drug: glycopyrrol-nebulizer-accessor  1 mL.     MUCINEX 600 mg 12 hr tablet  Generic drug: guaiFENesin  Mucinex 600 mg tablet, extended release   Take 1 tablet every 12 hours by oral  route for 7 days.     pantoprazole 40 MG tablet  Commonly known as: PROTONIX  pantoprazole 40 mg tablet,delayed release   Take 1 tablet every day by oral route.     pravastatin 10 MG tablet  Commonly known as: PRAVACHOL  pravastatin 10 mg tablet   Take 1 tablet(s) every day by oral route.     traZODone 100 MG tablet  Commonly known as: DESYREL  trazodone 100 mg tablet   Take 2 tablets every day by oral route at bedtime.         * This list has 2 medication(s) that are the same as other medications prescribed for you. Read the directions carefully, and ask your doctor or other care provider to review them with you.            STOP taking these medications    lisinopriL 10 MG tablet            Indwelling Lines/Drains at time of discharge:   Lines/Drains/Airways     Drain  Duration                Urethral Catheter 05/08/23 2150 Silicone 16 Fr. 2 days                Time spent on the discharge of patient: 31 minutes    Critical care time spent on the evaluation and treatment of severe organ dysfunction, review of pertinent labs and imaging studies, discussions with consulting providers and discussions with patient/family: 1 minutes.     Renetta Alcantar MD  Department of Hospital Medicine  [facility] - Intensive Care ([facility])

## 2023-05-11 NOTE — HOSPITAL COURSE
5/11  Palliative consulted. Patient elected home with hospice. Consents signed for hospice.    Patient transitioned to po medication(s) for discharge. Prescriptions delivered to bedside prior to discharge per hospice recommendations.    Patient seen and evaluated by me. Patient was determined to be suitable for d/c. Patient deemed stable for discharge to home with hospice.

## 2023-05-11 NOTE — PT/OT/SLP PROGRESS
Physical Therapy Treatment    Patient Name:  Margie Zamudio   MRN:  [id number]    Recommendations:     Discharge Recommendations: nursing facility, skilled  Discharge Equipment Recommendations: to be determined by next level of care  Barriers to discharge: None    Assessment:     Margie Zamudio is a 67 y.o. female admitted with a medical diagnosis of Sepsis with acute hypoxic respiratory failure.  She presents with the following impairments/functional limitations: weakness, impaired endurance, impaired functional mobility, gait instability, impaired balance, decreased safety awareness, decreased lower extremity function, decreased upper extremity function, decreased coordination, edema, impaired skin, impaired fine motor, abnormal tone.    Rehab Prognosis: Fair; patient would benefit from acute skilled PT services to address these deficits and reach maximum level of function.    Recent Surgery: * No surgery found *     Plan:     During this hospitalization, patient to be seen 3 x/week to address the identified rehab impairments via gait training, therapeutic activities, therapeutic exercises and progress toward the following goals:    Plan of Care Expires:  05/23/23    Subjective     Chief Complaint: NONE, READY TO GET OOB  Patient/Family Comments/goals:   Pain/Comfort:  Pain Rating 1: 0/10      Objective:     Communicated with NURSE PANDA prior to session.  Patient found supine with blood pressure cuff, arterial line, pulse ox (continuous), telemetry, peripheral IV, alvarez catheter upon PT entry to room.     General Precautions: Standard, fall  Orthopedic Precautions: N/A  Braces: N/A  Respiratory Status: Room air     Functional Mobility:  Bed Mobility:     Rolling Left:  minimum assistance  Scooting: minimum assistance  Supine to Sit: minimum assistance  Transfers:     Sit to Stand:  minimum assistance with rolling walker  Bed to Chair: moderate assistance and of 2 persons with  rolling walker  using  Step  "Transfer  Gait: PT AMB APPROX. 5' WITH RW AND MODA X 2 TO TF FROM BED TO CHAIR WITH RW, MAXA FOR R HAND  TO RW, VERBAL AND TACTILE CUES TO STAY ON TASK, CUES FOR UPRIGHT POSTURE AND RW SAFETY  Balance: FAIR SITTING BALANCE, POOR DYNAMIC BALANCE DURING TF AND GAIT    AM-PAC 6 CLICK MOBILITY  Turning over in bed (including adjusting bedclothes, sheets and blankets)?: 3  Sitting down on and standing up from a chair with arms (e.g., wheelchair, bedside commode, etc.): 3  Moving from lying on back to sitting on the side of the bed?: 3  Moving to and from a bed to a chair (including a wheelchair)?: 2  Need to walk in hospital room?: 2  Climbing 3-5 steps with a railing?: 1  Basic Mobility Total Score: 14     Treatment & Education:  REVIEW ROLE OF P.T. AND POC IN ACUTE CARE HOSPITAL SETTING, REVIEW RW USE AND SAFETY DURING TF'S AND GAIT, ENCOURAGED TO INCREASE TIME OOB IN CHAIR TO TOLERANCE, EDUCATED IN AND ENCOURAGED TO PERFORM BLE THEREX WHILE SEATED OR SUPINE THROUGHOUT THE DAY TO TOLERANCE: HIP FLEX/EXT, QUAD SET, LAQ, HEEL SLIDES, AP'S.  PT AGREEABLE TO REQUESTS  PT EDUCATED ON RISK FOR FALLS DUE TO GENERALIZED WEAKNESS, EDUCATED ON "CALL DON'T FALL", ENCOURAGED TO CALL FOR ASSISTANCE WITH ALL NEEDS SUCH AS BED<>CHAIR TRANSFERS OR TRIPS TO BATHROOM, PT AGREEABLE TO SAFETY PRECAUTIONS    Patient left up in chair with all lines intact, call button in reach, chair alarm on, and NURSE notified..    GOALS:   Multidisciplinary Problems       Physical Therapy Goals          Problem: Physical Therapy    Goal Priority Disciplines Outcome Goal Variances Interventions   Physical Therapy Goal     PT, PT/OT Ongoing, Progressing     Description: LTG'S TO BE MET IN 14 DAYS (5-23-23)  PT WILL REQUIRE [facility] FOR BED MOBILITY  PT WILL REQUIRE [facility] FOR BED<>CHAIR TF'S  PT WILL  FEET WITH RW AND MILY                         Time Tracking:     PT Received On: 05/11/23  PT Start Time: 0850     PT Stop Time: 0915  PT Total Time " (min): 25 min     Billable Minutes: Therapeutic Activity 25    Treatment Type: Treatment  PT/PTA: PT     Number of PTA visits since last PT visit: 0     05/11/2023

## 2023-05-11 NOTE — PLAN OF CARE
AAOx4 with intermittent confusion.  Requiring oxygen via NC at 2L.  NSR on monitor.  Afebrile.   Discharging today home with hospice.   Went over discharge instructions with patient and family at bedside.  Stressed the importance of making and keeping all appointments.  Prescriptions delivered to bedside.  Pt verbalized understanding.   Had all questions regarding discharge answered to satisfaction.  IV and alvarez cath removed without complications.  All monitors removed.   Waiting for potable oxygen tank to deliver.

## 2023-05-11 NOTE — PROGRESS NOTES
[facility] Care ([facility])  Cardiology  Progress Note    Patient Name: Margie Zamudio  MRN: [id number]  Admission Date: 5/8/2023  Hospital Length of Stay: 2 days  Code Status: Full Code   Attending Physician: Renetta Alcantar MD   Primary Care Physician: Luis Mcmullen MD  Expected Discharge Date:   Principal Problem:Sepsis with acute hypoxic respiratory failure    Subjective:     Hospital Course:   5-10-23  no c/o , nurse states some confusion, continue asa, heparin, add b blockers    5-11-23  Pt states wants to go home, some confusion, discussed cardiac cath, she does not want, Risks and benefits explained ,                 Stop heparin after 48 hours, start plavix, continue b blockers      Interval History:     Review of Systems   Constitutional: Negative. Negative for weight gain.   HENT: Negative.     Eyes: Negative.    Cardiovascular: Negative.  Negative for chest pain, leg swelling and palpitations.   Respiratory: Negative.  Negative for shortness of breath.    Endocrine: Negative.    Hematologic/Lymphatic: Negative.    Skin: Negative.    Musculoskeletal:  Negative for muscle weakness.   Gastrointestinal: Negative.    Genitourinary: Negative.    Neurological: Negative.  Negative for dizziness.   Psychiatric/Behavioral: Negative.     Allergic/Immunologic: Negative.    All other systems reviewed and are negative.  Objective:     Vital Signs (Most Recent):  Temp: 97.5 °F (36.4 °C) (05/11/23 0730)  Pulse: 85 (05/11/23 0726)  Resp: (Abnormal) 21 (05/11/23 0726)  BP: 120/78 (05/11/23 0715)  SpO2: (Abnormal) 94 % (05/11/23 0726) Vital Signs (24h Range):  Temp:  [97.5 °F (36.4 °C)-98.8 °F (37.1 °C)] 97.5 °F (36.4 °C)  Pulse:  [] 85  Resp:  [18-44] 21  SpO2:  [88 %-100 %] 94 %  BP: ()/() 120/78  Arterial Line BP: (101-149)/() 123/72     Weight: 48.3 kg (106 lb 7.7 oz)  Body mass index is 20.12 kg/m².     SpO2: (Abnormal) 94 %         Intake/Output Summary (Last 24 hours) at 5/11/2023  0934  Last data filed at 5/11/2023 0500  Gross per 24 hour   Intake 520.6 ml   Output 2225 ml   Net -1704.4 ml       Lines/Drains/Airways       Drain       Name Duration         Urethral Catheter 05/08/23 2150 Silicone 16 Fr. 2 days              Arterial Line       Name Duration    Arterial Line 05/09/23 0135 Right Radial 2 days              Peripheral Intravenous Line       Name Duration         Peripheral IV - Single Lumen 05/08/23 2129 20 G Right Antecubital 2 days                       Physical Exam  Vitals and nursing note reviewed.   Constitutional:       Appearance: She is well-developed.   HENT:      Head: Normocephalic and atraumatic.   Eyes:      Conjunctiva/sclera: Conjunctivae normal.      Pupils: Pupils are equal, round, and reactive to light.   Cardiovascular:      Rate and Rhythm: Normal rate and regular rhythm.      Pulses: Intact distal pulses.      Heart sounds: Normal heart sounds.   Pulmonary:      Effort: Pulmonary effort is normal.      Breath sounds: Normal breath sounds.   Abdominal:      General: Bowel sounds are normal.      Palpations: Abdomen is soft.   Musculoskeletal:         General: Normal range of motion.      Cervical back: Normal range of motion and neck supple.   Skin:     General: Skin is warm and dry.   Neurological:      Mental Status: She is alert and oriented to person, place, and time.          Significant Labs: All pertinent lab results from the last 24 hours have been reviewed.    Significant Imaging: X-Ray: CXR: X-Ray Chest 1 View (CXR): No results found for this visit on 05/08/23.    Assessment and Plan:     Brief HPI:     NSTEMI (non-ST elevated myocardial infarction)  67-year-old female with PMHx for hypertension, COPD, stroke, chronic pain, narcotic dependence, anxiety, depression, and tobacco abuse admitted for pneumonia/sepsis with hypxemia.  EKG shows sinus tachycardia, LBBB isolated lead V4 with q wave and nonspecific ST elevation. Troponin is elevated at around  2. Sx are SOB, no CP.    Continue IV heparin, add asa, check echo, NSTEMI vs demand ischemia, consider cath once sepsis stable  Continue supportive care        VTE Risk Mitigation (From admission, onward)         Ordered     Place sequential compression device  Until discontinued         05/09/23 0152     IP VTE LOW RISK PATIENT  Once         05/09/23 0152     heparin 25,000 units in dextrose 5% (100 units/ml) IV bolus from bag - ADDITIONAL PRN BOLUS - 60 units/kg (max bolus 4000 units)  As needed (PRN)        Question:  Heparin Infusion Adjustment (DO NOT MODIFY ANSWER)  Answer:  [email or web address]\epic\Images\Pharmacy\HeparinInfusions\heparin LOW INTENSITY nomogram for [facility] [id number]    05/08/23 2350     heparin 25,000 units in dextrose 5% (100 units/ml) IV bolus from bag - ADDITIONAL PRN BOLUS - 30 units/kg (max bolus 4000 units)  As needed (PRN)        Question:  Heparin Infusion Adjustment (DO NOT MODIFY ANSWER)  Answer:  \\[email or web address]\epic\Images\Pharmacy\HeparinInfusions\heparin LOW INTENSITY nomogram for [facility] [id number]    05/08/23 2350     heparin 25,000 units in dextrose 5% 250 mL (100 units/mL) infusion LOW INTENSITY nomogram - [facility]  Continuous        Question Answer Comment   Heparin Infusion Adjustment (DO NOT MODIFY ANSWER) \\[email or web address]\epic\Images\Pharmacy\HeparinInfusions\heparin LOW INTENSITY nomogram for [facility] [id number]    Begin at (in units/kg/hr) 12        05/08/23 2350                Doyle Diallo MD  Cardiology  [facility] - Intensive Care ([facility])

## 2023-05-11 NOTE — ASSESSMENT & PLAN NOTE
Considerable wheezing on exam initially; still with wheezing currently, but much improved  Continue bronchodilators  Bipap discontinued  Transition to PO steroids this morning  - would plan for 12 day course from this point (40mg x 3 days, 30mg x 3, 20mg x 3, 10mg x 3)  Can change from Cefepime to Levaquin for discharge if she is going soon (complete 7 day course)  Continue guaifenesin

## 2023-05-11 NOTE — SUBJECTIVE & OBJECTIVE
Interval History:   Episode yesterday afternoon of acutely worsening SOB precipitating her to be placed back on Bipap for about 2 hours before weaning back to NC.  Episode somewhat correlates with her dose of Vanc (which we were discontinuing anyway), but doesn't seem like your typical Vanc reaction.  She had largely returned to baseline prior to any intervention other than Bipap, but did get some extra breathing treatments.    This morning, she is up in bed and on room air.  She says she is feeling great and is anxious to go home. Remains on heparin gtt.      Objective:     Vital Signs (Most Recent):  Temp: 97.5 °F (36.4 °C) (05/11/23 0730)  Pulse: 85 (05/11/23 0726)  Resp: (!) 21 (05/11/23 0726)  BP: 120/78 (05/11/23 0715)  SpO2: (!) 94 % (05/11/23 0726) Vital Signs (24h Range):  Temp:  [97.5 °F (36.4 °C)-98.8 °F (37.1 °C)] 97.5 °F (36.4 °C)  Pulse:  [] 85  Resp:  [18-44] 21  SpO2:  [88 %-100 %] 94 %  BP: ()/() 120/78  Arterial Line BP: (101-149)/() 123/72     Weight: 48.3 kg (106 lb 7.7 oz)  Body mass index is 20.12 kg/m².      Intake/Output Summary (Last 24 hours) at 5/11/2023 0849  Last data filed at 5/11/2023 0500  Gross per 24 hour   Intake 520.6 ml   Output 2225 ml   Net -1704.4 ml        Physical Exam  Vitals and nursing note reviewed.   Constitutional:       General: She is not in acute distress.     Comments: Chronically ill appearing   Cardiovascular:      Rate and Rhythm: Normal rate and regular rhythm.      Pulses: Normal pulses.      Heart sounds: No murmur heard.  Pulmonary:      Effort: Pulmonary effort is normal. No respiratory distress.      Breath sounds: Wheezing present. No rhonchi or rales.   Abdominal:      General: Abdomen is flat. There is no distension.      Palpations: Abdomen is soft.      Tenderness: There is no abdominal tenderness.   Musculoskeletal:      Right lower leg: No edema.      Left lower leg: No edema.   Neurological:      General: No focal deficit  present.      Mental Status: She is alert and oriented to person, place, and time. Mental status is at baseline.         Review of Systems    Vents:  Oxygen Concentration (%): 50 (05/11/23 0443)    Lines/Drains/Airways       Drain  Duration                  Urethral Catheter 05/08/23 2150 Silicone 16 Fr. 2 days              Arterial Line  Duration             Arterial Line 05/09/23 0135 Right Radial 2 days              Peripheral Intravenous Line  Duration                  Peripheral IV - Single Lumen 05/08/23 2129 20 G Right Antecubital 2 days                    Significant Labs:    CBC/Anemia Profile:  Recent Labs   Lab 05/10/23  0454 05/11/23  0425   WBC 21.65* 19.24*   HGB 12.9 12.8   HCT 39.0 39.6    287   MCV 87 87   RDW 13.2 13.2        Chemistries:  Recent Labs   Lab 05/10/23  0454 05/11/23  0425    139   K 4.8 4.2    101   CO2 24 22*   BUN 23 38*   CREATININE 1.0 1.2   CALCIUM 8.8 8.8   ALBUMIN  --  3.3*   PROT  --  6.1   BILITOT  --  0.4   ALKPHOS  --  67   ALT  --  27   AST  --  28   MG 1.9 2.1       All pertinent labs within the past 24 hours have been reviewed.    Significant Imaging:  I have reviewed all pertinent imaging results/findings within the past 24 hours.

## 2023-05-11 NOTE — CONSULTS
[facility] to meet with patient and family to sign consents. Home O2 being ordered and portable will be delivered to bedside.

## 2023-05-11 NOTE — PT/OT/SLP PROGRESS
"Occupational Therapy   Treatment    Name: Margie Zamudio  MRN: [id number]  Admitting Diagnosis:  Sepsis with acute hypoxic respiratory failure       Recommendations:     Discharge Recommendations: nursing facility, skilled  Discharge Equipment Recommendations:  to be determined by next level of care  Barriers to discharge:  Inaccessible home environment    Assessment:     Margie Zamudio is a 67 y.o. female with a medical diagnosis of Sepsis with acute hypoxic respiratory failure.  She presents with the following performance deficits affecting function are weakness, impaired endurance, impaired sensation, impaired self care skills, impaired functional mobility, impaired balance, decreased safety awareness, impaired cardiopulmonary response to activity, edema, impaired fine motor, abnormal tone, decreased ROM, impaired coordination, impaired cognition.     Rehab Prognosis:  Fair; patient would benefit from acute skilled OT services to address these deficits and reach maximum level of function.       Plan:     Patient to be seen 2 x/week to address the above listed problems via self-care/home management, therapeutic activities, therapeutic exercises  Plan of Care Expires: 05/23/23  Plan of Care Reviewed with: patient    Subjective     Chief Complaint: Reported "I am ready to go home."  Patient/Family Comments/goals:  go home  Pain/Comfort:  Pain Rating 1: 0/10  Pain Rating Post-Intervention 1: 0/10    Objective:     Communicated with: NurseVera, prior to session.  Patient found supine with blood pressure cuff, pulse ox (continuous), arterial line, telemetry, peripheral IV, alvarez catheter upon OT entry to room.    General Precautions: Standard, fall    Orthopedic Precautions:N/A  Braces: N/A  Respiratory Status: Room air     Occupational Performance:     Bed Mobility:    Patient completed Supine to Sit with maximal assistance     Functional Mobility/Transfers:  Patient completed Sit <> Stand Transfer with moderate " assistance  with  rolling walker   Patient completed Bed <> Chair Transfer using Step Transfer technique with moderate assistance and of 2 persons with rolling walker  Required total A to maintain R  on RW and for management of RW throughout.  Functional Mobility: Patient able to initiate ~5ft functional mobility to bedside chair with RW and mod A of 2.    Activities of Daily Living:  Grooming: moderate assistance completed grooming from bedside chair. Improved ability to wash face with L hand. Poor ability to manipulate toothbrush in L hand for completion of oral care.    [facility] 6 Click ADL: 12    Treatment & Education:  Patient tolerated intervention well overall. On RA with stable O2 sats throughout. Provided with max cueing for technique with transfers to increase functional strength and activity tolerance. Continued educated re: edema management due to moderate edema in R UE. Remains flaccid at hand and wrist immobilized 2/2 art line. Patient encouraged to complete L UE AROM therex throughout the day to increase functional strength and activity tolerance. Patient stated understanding and in agreement to call for A to transfer back to bed.    Patient left up in chair with all lines intact, call button in reach, chair alarm on, and nurse notified    GOALS:   Multidisciplinary Problems       Occupational Therapy Goals          Problem: Occupational Therapy    Goal Priority Disciplines Outcome Interventions   Occupational Therapy Goal     OT, PT/OT Ongoing, Progressing    Description: Goals to be met by: 5/23/23     Patient will increase functional independence with ADLs by performing:    Grooming while standing at sink with Supervision.  Toileting from bedside commode with Set-up Assistance for hygiene and clothing management.   Stand pivot transfers with Supervision.  Upper extremity exercise program x15 reps per handout, with assistance as needed.                         Time Tracking:     OT Date of  Treatment: 05/11/23  OT Start Time: 0840  OT Stop Time: 0905  OT Total Time (min): 25 min    Billable Minutes:Self Care/Home Management 10  Therapeutic Activity 15    5/11/2023

## 2023-05-11 NOTE — DISCHARGE INSTRUCTIONS
You have been started on new medication(s) from this hospitalization. Please take as directed and schedule hospital follow up appointment with your primary providers.

## 2023-05-11 NOTE — SUBJECTIVE & OBJECTIVE
Interval History:     Review of Systems   Constitutional: Negative. Negative for weight gain.   HENT: Negative.     Eyes: Negative.    Cardiovascular: Negative.  Negative for chest pain, leg swelling and palpitations.   Respiratory: Negative.  Negative for shortness of breath.    Endocrine: Negative.    Hematologic/Lymphatic: Negative.    Skin: Negative.    Musculoskeletal:  Negative for muscle weakness.   Gastrointestinal: Negative.    Genitourinary: Negative.    Neurological: Negative.  Negative for dizziness.   Psychiatric/Behavioral: Negative.     Allergic/Immunologic: Negative.    All other systems reviewed and are negative.  Objective:     Vital Signs (Most Recent):  Temp: 97.5 °F (36.4 °C) (05/11/23 0730)  Pulse: 85 (05/11/23 0726)  Resp: (Abnormal) 21 (05/11/23 0726)  BP: 120/78 (05/11/23 0715)  SpO2: (Abnormal) 94 % (05/11/23 0726) Vital Signs (24h Range):  Temp:  [97.5 °F (36.4 °C)-98.8 °F (37.1 °C)] 97.5 °F (36.4 °C)  Pulse:  [] 85  Resp:  [18-44] 21  SpO2:  [88 %-100 %] 94 %  BP: ()/() 120/78  Arterial Line BP: (101-149)/() 123/72     Weight: 48.3 kg (106 lb 7.7 oz)  Body mass index is 20.12 kg/m².     SpO2: (Abnormal) 94 %         Intake/Output Summary (Last 24 hours) at 5/11/2023 0934  Last data filed at 5/11/2023 0500  Gross per 24 hour   Intake 520.6 ml   Output 2225 ml   Net -1704.4 ml       Lines/Drains/Airways       Drain       Name Duration         Urethral Catheter 05/08/23 2150 Silicone 16 Fr. 2 days              Arterial Line       Name Duration    Arterial Line 05/09/23 0135 Right Radial 2 days              Peripheral Intravenous Line       Name Duration         Peripheral IV - Single Lumen 05/08/23 2129 20 G Right Antecubital 2 days                       Physical Exam  Vitals and nursing note reviewed.   Constitutional:       Appearance: She is well-developed.   HENT:      Head: Normocephalic and atraumatic.   Eyes:      Conjunctiva/sclera: Conjunctivae normal.       Pupils: Pupils are equal, round, and reactive to light.   Cardiovascular:      Rate and Rhythm: Normal rate and regular rhythm.      Pulses: Intact distal pulses.      Heart sounds: Normal heart sounds.   Pulmonary:      Effort: Pulmonary effort is normal.      Breath sounds: Normal breath sounds.   Abdominal:      General: Bowel sounds are normal.      Palpations: Abdomen is soft.   Musculoskeletal:         General: Normal range of motion.      Cervical back: Normal range of motion and neck supple.   Skin:     General: Skin is warm and dry.   Neurological:      Mental Status: She is alert and oriented to person, place, and time.          Significant Labs: All pertinent lab results from the last 24 hours have been reviewed.    Significant Imaging: X-Ray: CXR: X-Ray Chest 1 View (CXR): No results found for this visit on 05/08/23.

## 2023-05-11 NOTE — PROGRESS NOTES
[facility] - Intensive [facility] ([facility])  Pulmonology  Progress Note    Patient Name: Margie Zamudio  MRN: [id number]  Admission Date: 5/8/2023  Hospital Length of Stay: 2 days  Code Status: Full Code  Attending Provider: Renetta Alcantar MD  Primary Care Provider: Luis Mcmullen MD   Principal Problem: Sepsis with acute hypoxic respiratory failure    Subjective:     Interval History:   Episode yesterday afternoon of acutely worsening SOB precipitating her to be placed back on Bipap for about 2 hours before weaning back to NC.  Episode somewhat correlates with her dose of Vanc (which we were discontinuing anyway), but doesn't seem like your typical Vanc reaction.  She had largely returned to baseline prior to any intervention other than Bipap, but did get some extra breathing treatments.    This morning, she is up in bed and on room air.  She says she is feeling great and is anxious to go home. Remains on heparin gtt.      Objective:     Vital Signs (Most Recent):  Temp: 97.5 °F (36.4 °C) (05/11/23 0730)  Pulse: 85 (05/11/23 0726)  Resp: (!) 21 (05/11/23 0726)  BP: 120/78 (05/11/23 0715)  SpO2: (!) 94 % (05/11/23 0726) Vital Signs (24h Range):  Temp:  [97.5 °F (36.4 °C)-98.8 °F (37.1 °C)] 97.5 °F (36.4 °C)  Pulse:  [] 85  Resp:  [18-44] 21  SpO2:  [88 %-100 %] 94 %  BP: ()/() 120/78  Arterial Line BP: (101-149)/() 123/72     Weight: 48.3 kg (106 lb 7.7 oz)  Body mass index is 20.12 kg/m².      Intake/Output Summary (Last 24 hours) at 5/11/2023 0849  Last data filed at 5/11/2023 0500  Gross per 24 hour   Intake 520.6 ml   Output 2225 ml   Net -1704.4 ml        Physical Exam  Vitals and nursing note reviewed.   Constitutional:       General: She is not in acute distress.     Comments: Chronically ill appearing   Cardiovascular:      Rate and Rhythm: Normal rate and regular rhythm.      Pulses: Normal pulses.      Heart sounds: No murmur heard.  Pulmonary:      Effort: Pulmonary effort is normal. No  respiratory distress.      Breath sounds: Wheezing present. No rhonchi or rales.   Abdominal:      General: Abdomen is flat. There is no distension.      Palpations: Abdomen is soft.      Tenderness: There is no abdominal tenderness.   Musculoskeletal:      Right lower leg: No edema.      Left lower leg: No edema.   Neurological:      General: No focal deficit present.      Mental Status: She is alert and oriented to person, place, and time. Mental status is at baseline.         Review of Systems    Vents:  Oxygen Concentration (%): 50 (05/11/23 0443)    Lines/Drains/Airways       Drain  Duration                  Urethral Catheter 05/08/23 2150 Silicone 16 Fr. 2 days              Arterial Line  Duration             Arterial Line 05/09/23 0135 Right Radial 2 days              Peripheral Intravenous Line  Duration                  Peripheral IV - Single Lumen 05/08/23 2129 20 G Right Antecubital 2 days                    Significant Labs:    CBC/Anemia Profile:  Recent Labs   Lab 05/10/23  0454 05/11/23  0425   WBC 21.65* 19.24*   HGB 12.9 12.8   HCT 39.0 39.6    287   MCV 87 87   RDW 13.2 13.2        Chemistries:  Recent Labs   Lab 05/10/23  0454 05/11/23  0425    139   K 4.8 4.2    101   CO2 24 22*   BUN 23 38*   CREATININE 1.0 1.2   CALCIUM 8.8 8.8   ALBUMIN  --  3.3*   PROT  --  6.1   BILITOT  --  0.4   ALKPHOS  --  67   ALT  --  27   AST  --  28   MG 1.9 2.1       All pertinent labs within the past 24 hours have been reviewed.    Significant Imaging:  I have reviewed all pertinent imaging results/findings within the past 24 hours.      ABG  Recent Labs   Lab 05/09/23  0600   PH 7.381   PO2 89   PCO2 38.6   HCO3 22.9*   BE -2     Assessment/Plan:     Pulmonary  COPD exacerbation  Considerable wheezing on exam initially; still with wheezing currently, but much improved  Continue bronchodilators  Bipap discontinued  Transition to PO steroids this morning  - would plan for 12 day course from this  point (40mg x 3 days, 30mg x 3, 20mg x 3, 10mg x 3)  Can change from Cefepime to Levaquin for discharge if she is going soon (complete 7 day course)  Continue guaifenesin    Pneumonia of both lungs due to infectious organism  Broad-spectrum antibiotics started initially  Cultures NGTD  CXR stable  Oxygen requirement up to 2L at times, but on room air this morning  Stopped Vanc 5/10  Would continue Cefepime for now, but can transition to Levaquin when ready for discharge to complete a 7 day course      ID  * Sepsis with acute hypoxic respiratory failure  Respiratory failure was multifactorial due to COPD, CHF, pneumonia, NSTEMI  Initially required BIPAP, but weaned off AM of 5/9  Initial lactic acid 7.0 -> 2.7 with resuscitation  Blood, urine, sputum cultures NGTD  WBC 25.68, has infiltrate on CXR and dirty urine noted as well  - leukocytosis improving  Vanc/Cefepime initially  - Vanc stopped 5/10  - can change Cefepime to Levaquin when ready for discharge  Reported diarrhea x 2 days, stool studies pending  Oxygenation is currently stable on room air (typically wears 2-3L at home)           Murphy Ackerman MD  Pulmonology  [facility] - [facility]ensive [facility] ([facility])

## 2023-05-14 LAB
BACTERIA BLD CULT: NORMAL
BACTERIA BLD CULT: NORMAL

## 2023-06-20 NOTE — PT/OT/SLP PROGRESS
"Occupational Therapy   Treatment    Name: Margie Zamudio  MRN: [id number]  Admitting Diagnosis:  Sepsis with acute hypoxic respiratory failure       Recommendations:     Discharge Recommendations: nursing facility, skilled  Discharge Equipment Recommendations:  to be determined by next level of care  Barriers to discharge:  Inaccessible home environment    Assessment:     Margie Zamudio is a 67 y.o. female with a medical diagnosis of Sepsis with acute hypoxic respiratory failure.  She presents with the following performance deficits affecting function are weakness, impaired endurance, impaired sensation, impaired self care skills, impaired functional mobility, impaired balance, impaired cardiopulmonary response to activity, decreased safety awareness, edema, impaired skin, impaired fine motor, impaired coordination, decreased ROM, impaired cognition.     Rehab Prognosis:  Fair; patient would benefit from acute skilled OT services to address these deficits and reach maximum level of function.       Plan:     Patient to be seen 2 x/week to address the above listed problems via self-care/home management, therapeutic activities, therapeutic exercises  Plan of Care Expires: 05/23/23  Plan of Care Reviewed with: patient    Subjective     Chief Complaint: reported "I will get up."  Patient/Family Comments/goals: none reported  Pain/Comfort:  Pain Rating 1: 0/10    Objective:     Communicated with: NurseKeila, prior to session.  Patient found supine with blood pressure cuff, telemetry, pulse ox (continuous), oxygen, peripheral IV, arterial line, alvarez catheter upon OT entry to room.    General Precautions: Standard, fall, respiratory    Orthopedic Precautions:N/A  Braces: N/A  Respiratory Status: Nasal cannula, flow 2 L/min     Occupational Performance:     Bed Mobility:    Patient completed Supine to Sit with minimum assistance     Functional Mobility/Transfers:  Patient completed Sit <> Stand Transfer with minimum " assistance  with  rolling walker   Patient completed Bed <> Chair Transfer using Step Transfer technique with minimum assistance with rolling walker  Functional Mobility: Patient completed ~4ft functional mobility to bedside chair with RW and min A. Required A for management of R side of RW due to poor  of R hand.    Activities of Daily Living:  Grooming: maximal assistance Patient with very poor use of L nondominant hand. Unable to wash face with wet cloth without max A and A with management of cup for mouth rinse.    Patient reports increase in R dominant hand symptoms from previous CVA. Trace , moderate edema, and poor to absent FMC. Noted to have art line in place eliminating ability to assess R wrist ROM/strength.     [facility] 6 Click ADL: 12    Treatment & Education:  Patient tolerated intervention fair. Remains with cognitive deficits limiting ability to follow commands consistently and express overall symptoms with mobility. Patient with poorly accessible living environment with exacerbation in previous CVA symptoms, therefore, d/c recommendation made for SNF. Patient provided with max cueing throughout for overall safety with transfers. Encouraged completion of B UE AROM therex, as able, to increase functional strength and activity tolerance. Patient stated understanding, in agreement with POC, and in agreement to call for A to transfer back to bed.    Patient left up in chair with all lines intact, call button in reach, chair alarm on, and nurse notified    GOALS:   Multidisciplinary Problems       Occupational Therapy Goals          Problem: Occupational Therapy    Goal Priority Disciplines Outcome Interventions   Occupational Therapy Goal     OT, PT/OT Ongoing, Progressing    Description: Goals to be met by: 5/23/23     Patient will increase functional independence with ADLs by performing:    Grooming while standing at sink with Supervision.  Toileting from bedside commode with Set-up Assistance for  hygiene and clothing management.   Stand pivot transfers with Supervision.  Upper extremity exercise program x15 reps per handout, with assistance as needed.                         Time Tracking:     OT Date of Treatment: 05/10/23  OT Start Time: 0740  OT Stop Time: 0805  OT Total Time (min): 25 min    Billable Minutes:Self Care/Home Management 10  Therapeutic Activity 15    5/10/2023   .

## 2023-09-04 NOTE — TELEPHONE ENCOUNTER
Received referral. Tried to call both numbers to schedule an appointment, no answer. Was unable to leave voicemail due to it not being set up.  
LMP